# Patient Record
Sex: FEMALE | Race: ASIAN | NOT HISPANIC OR LATINO | Employment: FULL TIME | URBAN - METROPOLITAN AREA
[De-identification: names, ages, dates, MRNs, and addresses within clinical notes are randomized per-mention and may not be internally consistent; named-entity substitution may affect disease eponyms.]

---

## 2017-01-30 ENCOUNTER — PRENATAL OFFICE VISIT - HEALTHEAST (OUTPATIENT)
Dept: FAMILY MEDICINE | Facility: CLINIC | Age: 32
End: 2017-01-30

## 2017-01-30 DIAGNOSIS — M54.50 LOW BACK PAIN DURING PREGNANCY IN THIRD TRIMESTER: ICD-10-CM

## 2017-01-30 DIAGNOSIS — O26.893 LOW BACK PAIN DURING PREGNANCY IN THIRD TRIMESTER: ICD-10-CM

## 2017-01-30 DIAGNOSIS — E05.90 HYPERTHYROIDISM: ICD-10-CM

## 2017-01-30 DIAGNOSIS — Z33.1 PREGNANT STATE, INCIDENTAL: ICD-10-CM

## 2017-02-01 ENCOUNTER — COMMUNICATION - HEALTHEAST (OUTPATIENT)
Dept: FAMILY MEDICINE | Facility: CLINIC | Age: 32
End: 2017-02-01

## 2017-02-02 ENCOUNTER — AMBULATORY - HEALTHEAST (OUTPATIENT)
Dept: EDUCATION SERVICES | Facility: CLINIC | Age: 32
End: 2017-02-02

## 2017-02-02 ENCOUNTER — COMMUNICATION - HEALTHEAST (OUTPATIENT)
Dept: EDUCATION SERVICES | Facility: CLINIC | Age: 32
End: 2017-02-02

## 2017-02-02 DIAGNOSIS — O24.414 INSULIN CONTROLLED GESTATIONAL DIABETES MELLITUS (GDM) DURING PREGNANCY, ANTEPARTUM: ICD-10-CM

## 2017-02-07 ENCOUNTER — AMBULATORY - HEALTHEAST (OUTPATIENT)
Dept: EDUCATION SERVICES | Facility: CLINIC | Age: 32
End: 2017-02-07

## 2017-02-07 DIAGNOSIS — O24.419 GESTATIONAL DIABETES: ICD-10-CM

## 2017-02-08 ENCOUNTER — COMMUNICATION - HEALTHEAST (OUTPATIENT)
Dept: ENDOCRINOLOGY | Facility: CLINIC | Age: 32
End: 2017-02-08

## 2017-02-09 ENCOUNTER — OFFICE VISIT - HEALTHEAST (OUTPATIENT)
Dept: PHYSICAL THERAPY | Facility: REHABILITATION | Age: 32
End: 2017-02-09

## 2017-02-09 ENCOUNTER — COMMUNICATION - HEALTHEAST (OUTPATIENT)
Dept: ENDOCRINOLOGY | Facility: CLINIC | Age: 32
End: 2017-02-09

## 2017-02-09 DIAGNOSIS — M62.81 MUSCLE WEAKNESS (GENERALIZED): ICD-10-CM

## 2017-02-09 DIAGNOSIS — O26.893 PREGNANCY WITH LOW BACK PAIN IN THIRD TRIMESTER, ANTEPARTUM: ICD-10-CM

## 2017-02-09 DIAGNOSIS — M54.50 PREGNANCY WITH LOW BACK PAIN IN THIRD TRIMESTER, ANTEPARTUM: ICD-10-CM

## 2017-02-09 DIAGNOSIS — O24.414 INSULIN CONTROLLED GESTATIONAL DIABETES MELLITUS (GDM) DURING PREGNANCY, ANTEPARTUM: ICD-10-CM

## 2017-02-12 ENCOUNTER — COMMUNICATION - HEALTHEAST (OUTPATIENT)
Dept: FAMILY MEDICINE | Facility: CLINIC | Age: 32
End: 2017-02-12

## 2017-02-12 DIAGNOSIS — O24.419 GESTATIONAL DIABETES: ICD-10-CM

## 2017-02-12 DIAGNOSIS — O24.414 INSULIN CONTROLLED GESTATIONAL DIABETES MELLITUS (GDM) DURING PREGNANCY, ANTEPARTUM: ICD-10-CM

## 2017-02-14 ENCOUNTER — COMMUNICATION - HEALTHEAST (OUTPATIENT)
Dept: PHYSICAL THERAPY | Facility: REHABILITATION | Age: 32
End: 2017-02-14

## 2017-02-14 ENCOUNTER — PRENATAL OFFICE VISIT - HEALTHEAST (OUTPATIENT)
Dept: FAMILY MEDICINE | Facility: CLINIC | Age: 32
End: 2017-02-14

## 2017-02-14 DIAGNOSIS — Z33.1 PREGNANT STATE, INCIDENTAL: ICD-10-CM

## 2017-02-14 DIAGNOSIS — Z3A.32 32 WEEKS GESTATION OF PREGNANCY: ICD-10-CM

## 2017-02-16 ENCOUNTER — OFFICE VISIT - HEALTHEAST (OUTPATIENT)
Dept: EDUCATION SERVICES | Facility: CLINIC | Age: 32
End: 2017-02-16

## 2017-02-16 ENCOUNTER — OFFICE VISIT - HEALTHEAST (OUTPATIENT)
Dept: ENDOCRINOLOGY | Facility: CLINIC | Age: 32
End: 2017-02-16

## 2017-02-16 DIAGNOSIS — O24.414 INSULIN CONTROLLED GESTATIONAL DIABETES MELLITUS (GDM) IN THIRD TRIMESTER: ICD-10-CM

## 2017-02-16 ASSESSMENT — MIFFLIN-ST. JEOR: SCORE: 1395.09

## 2017-02-23 ENCOUNTER — COMMUNICATION - HEALTHEAST (OUTPATIENT)
Dept: FAMILY MEDICINE | Facility: CLINIC | Age: 32
End: 2017-02-23

## 2017-02-23 ENCOUNTER — OFFICE VISIT - HEALTHEAST (OUTPATIENT)
Dept: PHYSICAL THERAPY | Facility: REHABILITATION | Age: 32
End: 2017-02-23

## 2017-02-23 DIAGNOSIS — M62.81 MUSCLE WEAKNESS (GENERALIZED): ICD-10-CM

## 2017-02-23 DIAGNOSIS — O26.893 PREGNANCY WITH LOW BACK PAIN IN THIRD TRIMESTER, ANTEPARTUM: ICD-10-CM

## 2017-02-23 DIAGNOSIS — M54.50 PREGNANCY WITH LOW BACK PAIN IN THIRD TRIMESTER, ANTEPARTUM: ICD-10-CM

## 2017-02-23 DIAGNOSIS — O24.414 INSULIN CONTROLLED GESTATIONAL DIABETES MELLITUS (GDM) DURING PREGNANCY, ANTEPARTUM: ICD-10-CM

## 2017-02-23 DIAGNOSIS — O24.419 GESTATIONAL DIABETES: ICD-10-CM

## 2017-02-28 ENCOUNTER — PRENATAL OFFICE VISIT - HEALTHEAST (OUTPATIENT)
Dept: FAMILY MEDICINE | Facility: CLINIC | Age: 32
End: 2017-02-28

## 2017-02-28 ENCOUNTER — OFFICE VISIT - HEALTHEAST (OUTPATIENT)
Dept: PHYSICAL THERAPY | Facility: REHABILITATION | Age: 32
End: 2017-02-28

## 2017-02-28 DIAGNOSIS — O26.893 PREGNANCY WITH LOW BACK PAIN IN THIRD TRIMESTER, ANTEPARTUM: ICD-10-CM

## 2017-02-28 DIAGNOSIS — M62.81 MUSCLE WEAKNESS (GENERALIZED): ICD-10-CM

## 2017-02-28 DIAGNOSIS — O24.414 INSULIN CONTROLLED GESTATIONAL DIABETES MELLITUS (GDM) IN THIRD TRIMESTER: ICD-10-CM

## 2017-02-28 DIAGNOSIS — M54.50 PREGNANCY WITH LOW BACK PAIN IN THIRD TRIMESTER, ANTEPARTUM: ICD-10-CM

## 2017-02-28 DIAGNOSIS — Z33.1 PREGNANT STATE, INCIDENTAL: ICD-10-CM

## 2017-03-02 ENCOUNTER — COMMUNICATION - HEALTHEAST (OUTPATIENT)
Dept: FAMILY MEDICINE | Facility: CLINIC | Age: 32
End: 2017-03-02

## 2017-03-03 ENCOUNTER — HOSPITAL ENCOUNTER (OUTPATIENT)
Dept: ULTRASOUND IMAGING | Facility: CLINIC | Age: 32
Discharge: HOME | End: 2017-03-03
Attending: FAMILY MEDICINE

## 2017-03-03 ENCOUNTER — HOSPITAL ENCOUNTER (OUTPATIENT)
Dept: MEDSURG UNIT | Facility: CLINIC | Age: 32
Discharge: HOME | End: 2017-03-03
Attending: FAMILY MEDICINE | Admitting: FAMILY MEDICINE

## 2017-03-03 ENCOUNTER — OFFICE VISIT - HEALTHEAST (OUTPATIENT)
Dept: ENDOCRINOLOGY | Facility: CLINIC | Age: 32
End: 2017-03-03

## 2017-03-03 DIAGNOSIS — O24.414 INSULIN CONTROLLED GESTATIONAL DIABETES MELLITUS (GDM) IN THIRD TRIMESTER: ICD-10-CM

## 2017-03-03 DIAGNOSIS — E05.90 HYPERTHYROIDISM: ICD-10-CM

## 2017-03-03 ASSESSMENT — MIFFLIN-ST. JEOR: SCORE: 1406.43

## 2017-03-07 ENCOUNTER — OFFICE VISIT - HEALTHEAST (OUTPATIENT)
Dept: PHYSICAL THERAPY | Facility: REHABILITATION | Age: 32
End: 2017-03-07

## 2017-03-07 DIAGNOSIS — M54.50 PREGNANCY WITH LOW BACK PAIN IN THIRD TRIMESTER, ANTEPARTUM: ICD-10-CM

## 2017-03-07 DIAGNOSIS — O26.893 PREGNANCY WITH LOW BACK PAIN IN THIRD TRIMESTER, ANTEPARTUM: ICD-10-CM

## 2017-03-07 DIAGNOSIS — M62.81 MUSCLE WEAKNESS (GENERALIZED): ICD-10-CM

## 2017-03-09 ENCOUNTER — HOSPITAL ENCOUNTER (OUTPATIENT)
Dept: MEDSURG UNIT | Facility: CLINIC | Age: 32
Discharge: HOME | End: 2017-03-09
Attending: FAMILY MEDICINE | Admitting: FAMILY MEDICINE

## 2017-03-09 ENCOUNTER — HOSPITAL ENCOUNTER (OUTPATIENT)
Dept: ULTRASOUND IMAGING | Facility: CLINIC | Age: 32
Discharge: HOME | End: 2017-03-09
Attending: FAMILY MEDICINE

## 2017-03-09 DIAGNOSIS — O24.414 INSULIN CONTROLLED GESTATIONAL DIABETES MELLITUS (GDM) IN THIRD TRIMESTER: ICD-10-CM

## 2017-03-13 ENCOUNTER — COMMUNICATION - HEALTHEAST (OUTPATIENT)
Dept: FAMILY MEDICINE | Facility: CLINIC | Age: 32
End: 2017-03-13

## 2017-03-13 DIAGNOSIS — O24.414 INSULIN CONTROLLED GESTATIONAL DIABETES MELLITUS (GDM) DURING PREGNANCY, ANTEPARTUM: ICD-10-CM

## 2017-03-14 ENCOUNTER — PRENATAL OFFICE VISIT - HEALTHEAST (OUTPATIENT)
Dept: FAMILY MEDICINE | Facility: CLINIC | Age: 32
End: 2017-03-14

## 2017-03-14 DIAGNOSIS — Z33.1 PREGNANT STATE, INCIDENTAL: ICD-10-CM

## 2017-03-14 DIAGNOSIS — Z3A.36 36 WEEKS GESTATION OF PREGNANCY: ICD-10-CM

## 2017-03-14 DIAGNOSIS — O24.414 INSULIN CONTROLLED GESTATIONAL DIABETES MELLITUS (GDM) DURING PREGNANCY, ANTEPARTUM: ICD-10-CM

## 2017-03-16 ENCOUNTER — HOSPITAL ENCOUNTER (OUTPATIENT)
Dept: MEDSURG UNIT | Facility: CLINIC | Age: 32
Discharge: HOME | End: 2017-03-16
Attending: FAMILY MEDICINE | Admitting: FAMILY MEDICINE

## 2017-03-16 ENCOUNTER — HOSPITAL ENCOUNTER (OUTPATIENT)
Dept: ULTRASOUND IMAGING | Facility: CLINIC | Age: 32
Discharge: HOME | End: 2017-03-16
Attending: FAMILY MEDICINE

## 2017-03-16 DIAGNOSIS — O24.414 INSULIN CONTROLLED GESTATIONAL DIABETES MELLITUS (GDM) IN THIRD TRIMESTER: ICD-10-CM

## 2017-03-17 ENCOUNTER — OFFICE VISIT - HEALTHEAST (OUTPATIENT)
Dept: ENDOCRINOLOGY | Facility: CLINIC | Age: 32
End: 2017-03-17

## 2017-03-17 DIAGNOSIS — E05.90 HYPERTHYROIDISM: ICD-10-CM

## 2017-03-17 DIAGNOSIS — O24.414 INSULIN CONTROLLED GESTATIONAL DIABETES MELLITUS (GDM) DURING PREGNANCY, ANTEPARTUM: ICD-10-CM

## 2017-03-17 DIAGNOSIS — E04.9 GOITER: ICD-10-CM

## 2017-03-21 ENCOUNTER — PRENATAL OFFICE VISIT - HEALTHEAST (OUTPATIENT)
Dept: FAMILY MEDICINE | Facility: CLINIC | Age: 32
End: 2017-03-21

## 2017-03-21 DIAGNOSIS — Z3A.37 37 WEEKS GESTATION OF PREGNANCY: ICD-10-CM

## 2017-03-23 ENCOUNTER — HOSPITAL ENCOUNTER (OUTPATIENT)
Dept: ULTRASOUND IMAGING | Facility: CLINIC | Age: 32
Discharge: HOME | End: 2017-03-23
Attending: FAMILY MEDICINE

## 2017-03-23 ENCOUNTER — HOSPITAL ENCOUNTER (OUTPATIENT)
Dept: MEDSURG UNIT | Facility: CLINIC | Age: 32
Discharge: HOME | End: 2017-03-23
Attending: FAMILY MEDICINE | Admitting: FAMILY MEDICINE

## 2017-03-23 DIAGNOSIS — O24.414 INSULIN CONTROLLED GESTATIONAL DIABETES MELLITUS (GDM) IN THIRD TRIMESTER: ICD-10-CM

## 2017-03-28 ENCOUNTER — PRENATAL OFFICE VISIT - HEALTHEAST (OUTPATIENT)
Dept: FAMILY MEDICINE | Facility: CLINIC | Age: 32
End: 2017-03-28

## 2017-03-28 DIAGNOSIS — Z33.1 PREGNANT STATE, INCIDENTAL: ICD-10-CM

## 2017-03-30 ENCOUNTER — ANESTHESIA - HEALTHEAST (OUTPATIENT)
Dept: OBGYN | Facility: CLINIC | Age: 32
End: 2017-03-30

## 2017-04-03 ENCOUNTER — COMMUNICATION - HEALTHEAST (OUTPATIENT)
Dept: OBGYN | Facility: CLINIC | Age: 32
End: 2017-04-03

## 2017-04-18 ENCOUNTER — COMMUNICATION - HEALTHEAST (OUTPATIENT)
Dept: FAMILY MEDICINE | Facility: CLINIC | Age: 32
End: 2017-04-18

## 2017-05-09 ENCOUNTER — OFFICE VISIT - HEALTHEAST (OUTPATIENT)
Dept: FAMILY MEDICINE | Facility: CLINIC | Age: 32
End: 2017-05-09

## 2017-05-09 DIAGNOSIS — E05.90 HYPERTHYROIDISM: ICD-10-CM

## 2017-05-09 DIAGNOSIS — R21 RASH: ICD-10-CM

## 2017-05-09 LAB — HBA1C MFR BLD: 5.3 % (ref 3.5–6)

## 2017-06-01 ENCOUNTER — COMMUNICATION - HEALTHEAST (OUTPATIENT)
Dept: TELEHEALTH | Facility: CLINIC | Age: 32
End: 2017-06-01

## 2017-06-01 ENCOUNTER — COMMUNICATION - HEALTHEAST (OUTPATIENT)
Dept: HEALTH INFORMATION MANAGEMENT | Facility: CLINIC | Age: 32
End: 2017-06-01

## 2017-07-07 ENCOUNTER — COMMUNICATION - HEALTHEAST (OUTPATIENT)
Dept: FAMILY MEDICINE | Facility: CLINIC | Age: 32
End: 2017-07-07

## 2017-07-07 ENCOUNTER — OFFICE VISIT - HEALTHEAST (OUTPATIENT)
Dept: FAMILY MEDICINE | Facility: CLINIC | Age: 32
End: 2017-07-07

## 2017-07-07 DIAGNOSIS — R42 EPISODIC LIGHTHEADEDNESS: ICD-10-CM

## 2017-07-07 DIAGNOSIS — N91.2 AMENORRHEA: ICD-10-CM

## 2017-09-28 ENCOUNTER — OFFICE VISIT - HEALTHEAST (OUTPATIENT)
Dept: FAMILY MEDICINE | Facility: CLINIC | Age: 32
End: 2017-09-28

## 2017-09-28 DIAGNOSIS — E05.90 HYPERTHYROIDISM: ICD-10-CM

## 2017-09-28 DIAGNOSIS — E04.9 GOITER: ICD-10-CM

## 2017-09-28 DIAGNOSIS — R42 DIZZY: ICD-10-CM

## 2017-11-29 ENCOUNTER — OFFICE VISIT - HEALTHEAST (OUTPATIENT)
Dept: FAMILY MEDICINE | Facility: CLINIC | Age: 32
End: 2017-11-29

## 2017-11-29 DIAGNOSIS — J01.90 SINUSITIS, ACUTE: ICD-10-CM

## 2018-01-04 ENCOUNTER — OFFICE VISIT - HEALTHEAST (OUTPATIENT)
Dept: FAMILY MEDICINE | Facility: CLINIC | Age: 33
End: 2018-01-04

## 2018-01-04 DIAGNOSIS — N89.8 VAGINAL DISCHARGE: ICD-10-CM

## 2018-01-04 DIAGNOSIS — R21 RASH AND NONSPECIFIC SKIN ERUPTION: ICD-10-CM

## 2018-01-04 DIAGNOSIS — N92.6 IRREGULAR MENSTRUAL BLEEDING: ICD-10-CM

## 2018-01-04 LAB
CLUE CELLS: NORMAL
HCG UR QL: NEGATIVE
SP GR UR STRIP: 1.02 (ref 1–1.03)
TRICHOMONAS, WET PREP: NORMAL
YEAST, WET PREP: NORMAL

## 2018-01-17 ENCOUNTER — OFFICE VISIT - HEALTHEAST (OUTPATIENT)
Dept: FAMILY MEDICINE | Facility: CLINIC | Age: 33
End: 2018-01-17

## 2018-01-17 DIAGNOSIS — N39.46 MIXED STRESS AND URGE URINARY INCONTINENCE: ICD-10-CM

## 2018-01-17 DIAGNOSIS — L50.9 URTICARIA: ICD-10-CM

## 2018-01-17 DIAGNOSIS — N89.8 VAGINAL DISCHARGE: ICD-10-CM

## 2018-01-17 LAB
CLUE CELLS: NORMAL
TRICHOMONAS, WET PREP: NORMAL
YEAST, WET PREP: NORMAL

## 2018-01-17 ASSESSMENT — MIFFLIN-ST. JEOR: SCORE: 1334.99

## 2018-01-24 ENCOUNTER — OFFICE VISIT - HEALTHEAST (OUTPATIENT)
Dept: ALLERGY | Facility: CLINIC | Age: 33
End: 2018-01-24

## 2018-01-24 DIAGNOSIS — L50.9 URTICARIA: ICD-10-CM

## 2018-01-24 DIAGNOSIS — J30.81 CHRONIC ALLERGIC RHINITIS DUE TO ANIMAL HAIR AND DANDER: ICD-10-CM

## 2018-01-24 ASSESSMENT — MIFFLIN-ST. JEOR: SCORE: 1334.99

## 2018-02-15 ENCOUNTER — COMMUNICATION - HEALTHEAST (OUTPATIENT)
Dept: FAMILY MEDICINE | Facility: CLINIC | Age: 33
End: 2018-02-15

## 2018-02-15 DIAGNOSIS — R21 RASH AND NONSPECIFIC SKIN ERUPTION: ICD-10-CM

## 2018-02-27 ENCOUNTER — RECORDS - HEALTHEAST (OUTPATIENT)
Dept: ADMINISTRATIVE | Facility: OTHER | Age: 33
End: 2018-02-27

## 2018-06-12 ENCOUNTER — COMMUNICATION - HEALTHEAST (OUTPATIENT)
Dept: FAMILY MEDICINE | Facility: CLINIC | Age: 33
End: 2018-06-12

## 2018-06-12 DIAGNOSIS — R21 RASH AND NONSPECIFIC SKIN ERUPTION: ICD-10-CM

## 2018-07-05 ENCOUNTER — COMMUNICATION - HEALTHEAST (OUTPATIENT)
Dept: FAMILY MEDICINE | Facility: CLINIC | Age: 33
End: 2018-07-05

## 2018-07-05 DIAGNOSIS — Z11.1 SCREENING-PULMONARY TB: ICD-10-CM

## 2018-07-10 ENCOUNTER — AMBULATORY - HEALTHEAST (OUTPATIENT)
Dept: LAB | Facility: CLINIC | Age: 33
End: 2018-07-10

## 2018-07-10 DIAGNOSIS — Z11.1 SCREENING-PULMONARY TB: ICD-10-CM

## 2018-07-13 LAB
QTF INTERPRETATION: NORMAL
QTF MITOGEN - NIL: >10 IU/ML
QTF NIL: 0.15 IU/ML
QTF RESULT: NEGATIVE
QTF TB ANTIGEN - NIL: 0.08 IU/ML

## 2018-08-07 ENCOUNTER — RECORDS - HEALTHEAST (OUTPATIENT)
Dept: ADMINISTRATIVE | Facility: OTHER | Age: 33
End: 2018-08-07

## 2018-08-14 ENCOUNTER — COMMUNICATION - HEALTHEAST (OUTPATIENT)
Dept: FAMILY MEDICINE | Facility: CLINIC | Age: 33
End: 2018-08-14

## 2018-08-20 ENCOUNTER — OFFICE VISIT - HEALTHEAST (OUTPATIENT)
Dept: FAMILY MEDICINE | Facility: CLINIC | Age: 33
End: 2018-08-20

## 2018-08-20 DIAGNOSIS — R63.4 LOSS OF WEIGHT: ICD-10-CM

## 2018-08-20 DIAGNOSIS — D50.9 ANEMIA, IRON DEFICIENCY: ICD-10-CM

## 2018-08-20 DIAGNOSIS — E04.9 GOITER: ICD-10-CM

## 2018-08-20 LAB
ALBUMIN SERPL-MCNC: 4.3 G/DL (ref 3.5–5)
ALP SERPL-CCNC: 68 U/L (ref 45–120)
ALT SERPL W P-5'-P-CCNC: 12 U/L (ref 0–45)
ANION GAP SERPL CALCULATED.3IONS-SCNC: 11 MMOL/L (ref 5–18)
AST SERPL W P-5'-P-CCNC: 16 U/L (ref 0–40)
BILIRUB SERPL-MCNC: 1.3 MG/DL (ref 0–1)
BUN SERPL-MCNC: 7 MG/DL (ref 8–22)
CALCIUM SERPL-MCNC: 9.5 MG/DL (ref 8.5–10.5)
CHLORIDE BLD-SCNC: 105 MMOL/L (ref 98–107)
CO2 SERPL-SCNC: 25 MMOL/L (ref 22–31)
CREAT SERPL-MCNC: 0.67 MG/DL (ref 0.6–1.1)
ERYTHROCYTE [DISTWIDTH] IN BLOOD BY AUTOMATED COUNT: 13.5 % (ref 11–14.5)
FERRITIN SERPL-MCNC: 37 NG/ML (ref 10–130)
GFR SERPL CREATININE-BSD FRML MDRD: >60 ML/MIN/1.73M2
GLUCOSE BLD-MCNC: 72 MG/DL (ref 70–125)
HBA1C MFR BLD: 5.5 % (ref 3.5–6)
HCT VFR BLD AUTO: 40.1 % (ref 35–47)
HGB BLD-MCNC: 13.5 G/DL (ref 12–16)
MCH RBC QN AUTO: 28.8 PG (ref 27–34)
MCHC RBC AUTO-ENTMCNC: 33.7 G/DL (ref 32–36)
MCV RBC AUTO: 85 FL (ref 80–100)
PLATELET # BLD AUTO: 290 THOU/UL (ref 140–440)
PMV BLD AUTO: 8.2 FL (ref 7–10)
POTASSIUM BLD-SCNC: 4.1 MMOL/L (ref 3.5–5)
PROT SERPL-MCNC: 7.2 G/DL (ref 6–8)
RBC # BLD AUTO: 4.69 MILL/UL (ref 3.8–5.4)
SODIUM SERPL-SCNC: 141 MMOL/L (ref 136–145)
T3 SERPL-MCNC: 44 NG/DL (ref 45–175)
T4 FREE SERPL-MCNC: 0.9 NG/DL (ref 0.7–1.8)
T4 TOTAL - HISTORICAL: 6 UG/DL (ref 4.5–13)
TSH SERPL DL<=0.005 MIU/L-ACNC: 0.62 UIU/ML (ref 0.3–5)
WBC: 7.8 THOU/UL (ref 4–11)

## 2018-09-05 ENCOUNTER — COMMUNICATION - HEALTHEAST (OUTPATIENT)
Dept: FAMILY MEDICINE | Facility: CLINIC | Age: 33
End: 2018-09-05

## 2018-09-05 DIAGNOSIS — R21 RASH AND NONSPECIFIC SKIN ERUPTION: ICD-10-CM

## 2018-12-28 ENCOUNTER — OFFICE VISIT - HEALTHEAST (OUTPATIENT)
Dept: FAMILY MEDICINE | Facility: CLINIC | Age: 33
End: 2018-12-28

## 2018-12-28 DIAGNOSIS — N92.6 MISSED MENSES: ICD-10-CM

## 2018-12-28 DIAGNOSIS — R19.5 CHANGE IN CONSISTENCY OF STOOL: ICD-10-CM

## 2018-12-28 LAB
HCG UR QL: NEGATIVE
SP GR UR STRIP: 1.02 (ref 1–1.03)

## 2019-01-04 ENCOUNTER — AMBULATORY - HEALTHEAST (OUTPATIENT)
Dept: LAB | Facility: CLINIC | Age: 34
End: 2019-01-04

## 2019-01-04 DIAGNOSIS — R19.5 CHANGE IN CONSISTENCY OF STOOL: ICD-10-CM

## 2019-01-05 LAB
SHIGA TOXIN 1: NEGATIVE
SHIGA TOXIN 2: NEGATIVE

## 2019-01-07 LAB
BACTERIA SPEC CULT: NORMAL
O+P STL MICRO: NORMAL

## 2019-01-21 ENCOUNTER — RECORDS - HEALTHEAST (OUTPATIENT)
Dept: ADMINISTRATIVE | Facility: OTHER | Age: 34
End: 2019-01-21

## 2019-03-18 ENCOUNTER — OFFICE VISIT - HEALTHEAST (OUTPATIENT)
Dept: FAMILY MEDICINE | Facility: CLINIC | Age: 34
End: 2019-03-18

## 2019-03-18 ENCOUNTER — RECORDS - HEALTHEAST (OUTPATIENT)
Dept: GENERAL RADIOLOGY | Facility: CLINIC | Age: 34
End: 2019-03-18
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
## 2019-03-18 ASSESSMENT — MIFFLIN-ST. JEOR: SCORE: 1271.49

## 2019-03-22 ENCOUNTER — RECORDS - HEALTHEAST (OUTPATIENT)
Dept: ADMINISTRATIVE | Facility: OTHER | Age: 34
End: 2019-03-22

## 2019-06-24 ENCOUNTER — RECORDS - HEALTHEAST (OUTPATIENT)
Dept: ADMINISTRATIVE | Facility: OTHER | Age: 34
End: 2019-06-24

## 2019-08-05 ENCOUNTER — OFFICE VISIT - HEALTHEAST (OUTPATIENT)
Dept: FAMILY MEDICINE | Facility: CLINIC | Age: 34
End: 2019-08-05

## 2019-08-05 DIAGNOSIS — E05.90 HYPERTHYROIDISM: ICD-10-CM

## 2019-08-05 DIAGNOSIS — R35.0 URINARY FREQUENCY: ICD-10-CM

## 2019-08-05 DIAGNOSIS — R21 RASH AND NONSPECIFIC SKIN ERUPTION: ICD-10-CM

## 2019-08-05 DIAGNOSIS — G47.00 INSOMNIA, UNSPECIFIED TYPE: ICD-10-CM

## 2019-08-05 DIAGNOSIS — N39.3 FEMALE STRESS INCONTINENCE: ICD-10-CM

## 2019-08-05 LAB
ANION GAP SERPL CALCULATED.3IONS-SCNC: 6 MMOL/L (ref 5–18)
BUN SERPL-MCNC: 9 MG/DL (ref 8–22)
CALCIUM SERPL-MCNC: 9.5 MG/DL (ref 8.5–10.5)
CHLORIDE BLD-SCNC: 108 MMOL/L (ref 98–107)
CO2 SERPL-SCNC: 28 MMOL/L (ref 22–31)
CREAT SERPL-MCNC: 0.65 MG/DL (ref 0.6–1.1)
GFR SERPL CREATININE-BSD FRML MDRD: >60 ML/MIN/1.73M2
GLUCOSE BLD-MCNC: 78 MG/DL (ref 70–125)
HBA1C MFR BLD: 5.5 % (ref 3.5–6)
POTASSIUM BLD-SCNC: 3.8 MMOL/L (ref 3.5–5)
SODIUM SERPL-SCNC: 142 MMOL/L (ref 136–145)
T4 FREE SERPL-MCNC: 0.9 NG/DL (ref 0.7–1.8)
TSH SERPL DL<=0.005 MIU/L-ACNC: 0.6 UIU/ML (ref 0.3–5)

## 2019-08-05 ASSESSMENT — MIFFLIN-ST. JEOR: SCORE: 1271.03

## 2019-08-07 ENCOUNTER — COMMUNICATION - HEALTHEAST (OUTPATIENT)
Dept: FAMILY MEDICINE | Facility: CLINIC | Age: 34
End: 2019-08-07

## 2019-08-30 ENCOUNTER — RECORDS - HEALTHEAST (OUTPATIENT)
Dept: ADMINISTRATIVE | Facility: OTHER | Age: 34
End: 2019-08-30

## 2019-09-20 ENCOUNTER — COMMUNICATION - HEALTHEAST (OUTPATIENT)
Dept: FAMILY MEDICINE | Facility: CLINIC | Age: 34
End: 2019-09-20

## 2019-09-25 ENCOUNTER — OFFICE VISIT - HEALTHEAST (OUTPATIENT)
Dept: FAMILY MEDICINE | Facility: CLINIC | Age: 34
End: 2019-09-25

## 2019-09-25 DIAGNOSIS — N91.2 AMENORRHEA: ICD-10-CM

## 2019-09-25 DIAGNOSIS — J06.9 UPPER RESPIRATORY TRACT INFECTION, UNSPECIFIED TYPE: ICD-10-CM

## 2019-09-25 DIAGNOSIS — Z91.09 ENVIRONMENTAL ALLERGIES: ICD-10-CM

## 2019-09-25 LAB — HCG SERPL QL: POSITIVE

## 2019-09-25 ASSESSMENT — MIFFLIN-ST. JEOR: SCORE: 1241.72

## 2019-09-27 ENCOUNTER — COMMUNICATION - HEALTHEAST (OUTPATIENT)
Dept: FAMILY MEDICINE | Facility: CLINIC | Age: 34
End: 2019-09-27

## 2019-09-30 ENCOUNTER — PRENATAL OFFICE VISIT - HEALTHEAST (OUTPATIENT)
Dept: FAMILY MEDICINE | Facility: CLINIC | Age: 34
End: 2019-09-30

## 2019-09-30 DIAGNOSIS — E05.90 HYPERTHYROIDISM: ICD-10-CM

## 2019-09-30 DIAGNOSIS — Z34.90 PREGNANCY, UNSPECIFIED GESTATIONAL AGE: ICD-10-CM

## 2019-09-30 DIAGNOSIS — J01.00 ACUTE NON-RECURRENT MAXILLARY SINUSITIS: ICD-10-CM

## 2019-09-30 LAB
ABORH_EXT (HISTORICAL CONVERSION): NORMAL
ALBUMIN UR-MCNC: NEGATIVE MG/DL
ANTIBODY_EXT (HISTORICAL CONVERSION): NEGATIVE
APPEARANCE UR: ABNORMAL
BASOPHILS # BLD AUTO: 0.1 THOU/UL (ref 0–0.2)
BASOPHILS NFR BLD AUTO: 1 % (ref 0–2)
BILIRUB UR QL STRIP: NEGATIVE
COLOR UR AUTO: YELLOW
EOSINOPHIL # BLD AUTO: 0.2 THOU/UL (ref 0–0.4)
EOSINOPHIL NFR BLD AUTO: 3 % (ref 0–6)
ERYTHROCYTE [DISTWIDTH] IN BLOOD BY AUTOMATED COUNT: 14.1 % (ref 11–14.5)
GLUCOSE UR STRIP-MCNC: NEGATIVE MG/DL
HBSAG_EXT (HISTORICAL CONVERSION): NEGATIVE
HCT VFR BLD AUTO: 37.7 % (ref 35–47)
HGB BLD-MCNC: 12.1 G/DL (ref 12–16)
HGB UR QL STRIP: NEGATIVE
HIV 1+2 AB+HIV1 P24 AG SERPL QL IA: NEGATIVE
HIV_EXT: NEGATIVE
KETONES UR STRIP-MCNC: NEGATIVE MG/DL
LEUKOCYTE ESTERASE UR QL STRIP: ABNORMAL
LYMPHOCYTES # BLD AUTO: 1.9 THOU/UL (ref 0.8–4.4)
LYMPHOCYTES NFR BLD AUTO: 28 % (ref 20–40)
MCH RBC QN AUTO: 28 PG (ref 27–34)
MCHC RBC AUTO-ENTMCNC: 32.1 G/DL (ref 32–36)
MCV RBC AUTO: 87 FL (ref 80–100)
MONOCYTES # BLD AUTO: 0.5 THOU/UL (ref 0–0.9)
MONOCYTES NFR BLD AUTO: 7 % (ref 2–10)
NEUTROPHILS # BLD AUTO: 4.3 THOU/UL (ref 2–7.7)
NEUTROPHILS NFR BLD AUTO: 62 % (ref 50–70)
NITRATE UR QL: NEGATIVE
PH UR STRIP: 6 [PH] (ref 5–8)
PLATELET # BLD AUTO: 278 THOU/UL (ref 140–440)
PMV BLD AUTO: 11.3 FL (ref 8.5–12.5)
RBC # BLD AUTO: 4.32 MILL/UL (ref 3.8–5.4)
RPR - HISTORICAL: NORMAL
RUBELLA_EXT (HISTORICAL CONVERSION): NORMAL
SP GR UR STRIP: 1.02 (ref 1–1.03)
UROBILINOGEN UR STRIP-ACNC: ABNORMAL
WBC: 6.9 THOU/UL (ref 4–11)

## 2019-09-30 ASSESSMENT — MIFFLIN-ST. JEOR: SCORE: 1231.09

## 2019-10-01 ENCOUNTER — COMMUNICATION - HEALTHEAST (OUTPATIENT)
Dept: FAMILY MEDICINE | Facility: CLINIC | Age: 34
End: 2019-10-01

## 2019-10-01 DIAGNOSIS — Z34.90 PREGNANCY, UNSPECIFIED GESTATIONAL AGE: ICD-10-CM

## 2019-10-01 LAB
ABO/RH(D): NORMAL
ABORH REPEAT: NORMAL
ANTIBODY SCREEN: NEGATIVE
BACTERIA SPEC CULT: NORMAL
HBV SURFACE AG SERPL QL IA: NEGATIVE
RUBV IGG SERPL QL IA: POSITIVE
T PALLIDUM AB SER QL: NEGATIVE
TSH SERPL DL<=0.005 MIU/L-ACNC: 0.62 UIU/ML (ref 0.3–5)

## 2019-10-04 ENCOUNTER — COMMUNICATION - HEALTHEAST (OUTPATIENT)
Dept: FAMILY MEDICINE | Facility: CLINIC | Age: 34
End: 2019-10-04

## 2019-10-04 DIAGNOSIS — Z33.1 PREGNANCY, INCIDENTAL: ICD-10-CM

## 2019-10-09 ENCOUNTER — COMMUNICATION - HEALTHEAST (OUTPATIENT)
Dept: FAMILY MEDICINE | Facility: CLINIC | Age: 34
End: 2019-10-09

## 2019-10-09 DIAGNOSIS — Z91.09 ENVIRONMENTAL ALLERGIES: ICD-10-CM

## 2019-10-22 ENCOUNTER — OFFICE VISIT - HEALTHEAST (OUTPATIENT)
Dept: FAMILY MEDICINE | Facility: CLINIC | Age: 34
End: 2019-10-22

## 2019-10-22 DIAGNOSIS — O30.001 TWIN GESTATION IN FIRST TRIMESTER, UNSPECIFIED MULTIPLE GESTATION TYPE: ICD-10-CM

## 2019-10-22 LAB
ALBUMIN UR-MCNC: NEGATIVE MG/DL
APPEARANCE UR: CLEAR
BACTERIA #/AREA URNS HPF: ABNORMAL HPF
BILIRUB UR QL STRIP: NEGATIVE
COLOR UR AUTO: YELLOW
FASTING STATUS PATIENT QL REPORTED: NO
GLUCOSE 1H P 50 G GLC PO SERPL-MCNC: 98 MG/DL (ref 70–139)
GLUCOSE UR STRIP-MCNC: NEGATIVE MG/DL
HCG SERPL-ACNC: ABNORMAL MLU/ML (ref 0–4)
HGB UR QL STRIP: ABNORMAL
KETONES UR STRIP-MCNC: NEGATIVE MG/DL
LEUKOCYTE ESTERASE UR QL STRIP: NEGATIVE
MUCOUS THREADS #/AREA URNS LPF: ABNORMAL LPF
NITRATE UR QL: NEGATIVE
PH UR STRIP: 6 [PH] (ref 5–8)
RBC #/AREA URNS AUTO: ABNORMAL HPF
SP GR UR STRIP: 1.02 (ref 1–1.03)
SQUAMOUS #/AREA URNS AUTO: ABNORMAL LPF
UROBILINOGEN UR STRIP-ACNC: ABNORMAL
WBC #/AREA URNS AUTO: ABNORMAL HPF

## 2019-11-05 ENCOUNTER — RECORDS - HEALTHEAST (OUTPATIENT)
Dept: ADMINISTRATIVE | Facility: OTHER | Age: 34
End: 2019-11-05

## 2019-11-08 ENCOUNTER — RECORDS - HEALTHEAST (OUTPATIENT)
Dept: ADMINISTRATIVE | Facility: OTHER | Age: 34
End: 2019-11-08

## 2019-11-26 ENCOUNTER — RECORDS - HEALTHEAST (OUTPATIENT)
Dept: ADMINISTRATIVE | Facility: OTHER | Age: 34
End: 2019-11-26

## 2019-12-19 ENCOUNTER — OFFICE VISIT - HEALTHEAST (OUTPATIENT)
Dept: FAMILY MEDICINE | Facility: CLINIC | Age: 34
End: 2019-12-19

## 2019-12-19 DIAGNOSIS — R09.81 NASAL CONGESTION: ICD-10-CM

## 2019-12-19 DIAGNOSIS — H65.92 OME (OTITIS MEDIA WITH EFFUSION), LEFT: ICD-10-CM

## 2019-12-19 ASSESSMENT — MIFFLIN-ST. JEOR: SCORE: 1284.1

## 2019-12-26 ENCOUNTER — RECORDS - HEALTHEAST (OUTPATIENT)
Dept: ADMINISTRATIVE | Facility: OTHER | Age: 34
End: 2019-12-26

## 2020-01-16 ENCOUNTER — OFFICE VISIT - HEALTHEAST (OUTPATIENT)
Dept: ALLERGY | Facility: CLINIC | Age: 35
End: 2020-01-16

## 2020-01-16 DIAGNOSIS — Z91.09 ENVIRONMENTAL ALLERGIES: ICD-10-CM

## 2020-02-04 ENCOUNTER — COMMUNICATION - HEALTHEAST (OUTPATIENT)
Dept: FAMILY MEDICINE | Facility: CLINIC | Age: 35
End: 2020-02-04

## 2020-02-06 ENCOUNTER — COMMUNICATION - HEALTHEAST (OUTPATIENT)
Dept: FAMILY MEDICINE | Facility: CLINIC | Age: 35
End: 2020-02-06

## 2020-02-11 ENCOUNTER — PRENATAL OFFICE VISIT - HEALTHEAST (OUTPATIENT)
Dept: FAMILY MEDICINE | Facility: CLINIC | Age: 35
End: 2020-02-11

## 2020-02-11 DIAGNOSIS — Z34.90 PREGNANCY, UNSPECIFIED GESTATIONAL AGE: ICD-10-CM

## 2020-02-11 DIAGNOSIS — J01.00 ACUTE NON-RECURRENT MAXILLARY SINUSITIS: ICD-10-CM

## 2020-02-11 DIAGNOSIS — E05.90 HYPERTHYROIDISM: ICD-10-CM

## 2020-02-19 ENCOUNTER — COMMUNICATION - HEALTHEAST (OUTPATIENT)
Dept: FAMILY MEDICINE | Facility: CLINIC | Age: 35
End: 2020-02-19

## 2020-02-21 ENCOUNTER — COMMUNICATION - HEALTHEAST (OUTPATIENT)
Dept: FAMILY MEDICINE | Facility: CLINIC | Age: 35
End: 2020-02-21

## 2020-02-21 ENCOUNTER — AMBULATORY - HEALTHEAST (OUTPATIENT)
Dept: LAB | Facility: CLINIC | Age: 35
End: 2020-02-21

## 2020-02-21 DIAGNOSIS — Z34.90 PREGNANCY, UNSPECIFIED GESTATIONAL AGE: ICD-10-CM

## 2020-02-21 DIAGNOSIS — E05.90 HYPERTHYROIDISM: ICD-10-CM

## 2020-02-21 LAB
FASTING STATUS PATIENT QL REPORTED: NO
GLUCOSE 1H P 50 G GLC PO SERPL-MCNC: 132 MG/DL (ref 70–139)
HGB BLD-MCNC: 12.2 G/DL (ref 12–16)
T3 SERPL-MCNC: 101 NG/DL (ref 45–175)
T4 FREE SERPL-MCNC: 0.6 NG/DL (ref 0.7–1.8)
T4 TOTAL - HISTORICAL: 5.7 UG/DL (ref 4.5–13)
TSH SERPL DL<=0.005 MIU/L-ACNC: 0.17 UIU/ML (ref 0.3–5)

## 2020-02-22 LAB — T PALLIDUM AB SER QL: NEGATIVE

## 2020-03-02 ENCOUNTER — COMMUNICATION - HEALTHEAST (OUTPATIENT)
Dept: FAMILY MEDICINE | Facility: CLINIC | Age: 35
End: 2020-03-02

## 2020-03-10 ENCOUNTER — TELEPHONE (OUTPATIENT)
Dept: ENDOCRINOLOGY | Facility: CLINIC | Age: 35
End: 2020-03-10

## 2020-03-10 NOTE — TELEPHONE ENCOUNTER
To schedulers : please schedule with consult service (or open new/MAURILIO) within 1-2 weeks.  I don't see that she has been to DM education within the system.  This also needs to be scheduled if it isn't already scheduled.      Blaire Feng MD  Endocrine triage        [facility]    Phone Message    May a detailed message be left on voicemail: yes     Reason for Call: Other: Pt called to ask for a progress report.  She says she sent her records over from [facility] for gestastional diabetes and has been waiting for a call back after a review of those records.  Please call her to update.      I saw nothing in her record newer than 06/2013    Action Taken: Message routed to:  [facility] endocrine    Travel Screening: Not Applicable

## 2020-03-12 ENCOUNTER — TRANSCRIBE ORDERS (OUTPATIENT)
Dept: OTHER | Age: 35
End: 2020-03-12

## 2020-03-12 ENCOUNTER — PRENATAL OFFICE VISIT - HEALTHEAST (OUTPATIENT)
Dept: FAMILY MEDICINE | Facility: CLINIC | Age: 35
End: 2020-03-12

## 2020-03-12 ENCOUNTER — AMBULATORY - HEALTHEAST (OUTPATIENT)
Dept: FAMILY MEDICINE | Facility: CLINIC | Age: 35
End: 2020-03-12

## 2020-03-12 DIAGNOSIS — O24.419 GESTATIONAL DIABETES MELLITUS (GDM) IN SECOND TRIMESTER, GESTATIONAL DIABETES METHOD OF CONTROL UNSPECIFIED: ICD-10-CM

## 2020-03-12 DIAGNOSIS — Z34.90 PREGNANCY, UNSPECIFIED GESTATIONAL AGE: ICD-10-CM

## 2020-03-12 DIAGNOSIS — Z91.09 ENVIRONMENTAL ALLERGIES: ICD-10-CM

## 2020-03-12 DIAGNOSIS — E05.90 HYPERTHYROIDISM: Primary | ICD-10-CM

## 2020-03-12 LAB
FASTING STATUS PATIENT QL REPORTED: NO
GLUCOSE 1H P 50 G GLC PO SERPL-MCNC: 163 MG/DL (ref 70–139)

## 2020-03-13 ENCOUNTER — ALLIED HEALTH/NURSE VISIT (OUTPATIENT)
Dept: EDUCATION SERVICES | Facility: CLINIC | Age: 35
End: 2020-03-13
Payer: COMMERCIAL

## 2020-03-13 DIAGNOSIS — O24.419 GESTATIONAL DIABETES: Primary | ICD-10-CM

## 2020-03-13 RX ORDER — URINE GLUCOSE-ACET TEST STRIP
1 STRIP MISCELLANEOUS DAILY
Qty: 50 EACH | Refills: 0 | Status: SHIPPED | OUTPATIENT
Start: 2020-03-13 | End: 2021-06-30

## 2020-03-13 RX ORDER — LANCETS
EACH MISCELLANEOUS
Qty: 306 EACH | Refills: 3 | Status: SHIPPED | OUTPATIENT
Start: 2020-03-13 | End: 2020-10-23

## 2020-03-13 RX ORDER — BLOOD SUGAR DIAGNOSTIC
STRIP MISCELLANEOUS
Qty: 300 EACH | Refills: 3 | Status: SHIPPED | OUTPATIENT
Start: 2020-03-13 | End: 2020-10-23

## 2020-03-13 NOTE — PROGRESS NOTES
"Diabetes Self-Management Education & Support  Gestational Diabetes Self-Management Education & Support    SUBJECTIVE/OBJECTIVE:  Presents for education related to gestational diabetes.    Cultural Influences/Ethnic Background:  Mick  Weight today is 167 lbs  Weight prior to pregnancy was 145 lbs  Height 5'1\"  Had GDM in previous pregnancies: 2014, 2015 and 2016 required insulin in all of them  Did receive education in those pregnancies at [facility]   Referred by Dr Haily Shore at [facility] for Gestational Diabetes    Estimated Date of Delivery: Data Unavailable    1 hour OGTT  Lab Results   Component Value Date    GLU1 137 (H) 10/15/2013       3 hour OGTT    Fasting  Lab Results   Component Value Date    GLF 81 10/17/2013       1 hour  Lab Results   Component Value Date    GL1 227 (H) 10/17/2013       2 hour  Lab Results   Component Value Date    GL2 191 (H) 10/17/2013       3 hour  Lab Results   Component Value Date    GL3 105 10/17/2013       Lifestyle and Health Behaviors:  Activity limited due to back pain  Breakfast - 1 medium pancakes, 1/2 monk fruit and 1/2 regular syrup or 2 slices Upper sorbian toast with syrup as previously mentioned or plain bagel with cream cheese   Or occasionally at work will have 2 pieces toast white with butter and strawberry jam   Lunch - cafeteria at work Grilled chicken sandwich tomato rodriguez lettuce with vegetables, grapes  Lunch at home would be sticky rice with pork belly and spicy sauce  Dinner - sticky rice with pork belly or rice with scrambled egg and johnson or rice with stir andres is common   Dessert - traci coconut pancakes made with rice and tapioca flour or strawberry and nutela crepes   Recently got some stevia    Healthy Coping:  Has supportive fiance and three children whom she lives with     Current Management:  Diet    ASSESSMENT:  Patient with history of GDM, seen today for education     INTERVENTION:  Patient was instructed on Accu-Chek Guide Me " meter and was able to provide an accurate return demonstration. Patient's blood glucose reading today was 139 mg/dL.    Educational topics covered today:  GDM diagnosis, pathophysiology, Means of controlling GDM, Using a Blood Glucose Monitor, Blood Glucose Goals, Logging and Interpreting Glucose Results, Ketone Testing, When to Call a Diabetes Educator or OB Provider, Healthy Eating During Pregnancy, Counting Carbohydrates, Meal Planning for GDM, and Physical Activity    Educational materials provided today:   Cat Understanding Gestational Diabetes  Sample Meal Plan for Diabetes  Insulin Requirement During Pregnancy  Accu-Chek Guide Me meter kit    Pt verbalized understanding of concepts discussed and recommendations provided today.     PLAN:  Check glucose 4 times daily, before breakfast and 1 hour after each meal.     Check Ketones daily for one week, if negative, reduce testing to once a week.     Physical activity recommended: as able.    Meal plan: 30 to 45 g carbs at breakfast, 45 to 60 g carbs at lunch, 45 to 60 g carbs at supper, 15 to 30 g carbs at 3 snacks a day.  Follow consistent CHO meal plan, eat CHO and protein/fat at all meals/snacks.    We discussed specific ideas and ways to modify meals and snacks    Call/e-mail/Opptenhart message diabetes educator if 3 or more blood sugars are above the goal in 1 week, if ketones are positive, or with questions/concerns.    Time Spent: 90 minutes  Encounter Type: Individual    Any diabetes medication dose changes were made via the CDE Protocol and Collaborative Practice Agreement with the patient's referring provider. A copy of this encounter was shared with the provider.

## 2020-03-16 ENCOUNTER — COMMUNICATION - HEALTHEAST (OUTPATIENT)
Dept: ADMINISTRATIVE | Facility: CLINIC | Age: 35
End: 2020-03-16

## 2020-03-17 NOTE — TELEPHONE ENCOUNTER
Patient had recent referral for GDM. Has seen diabetic ed.     Patient now being referred for Hyperthyroid in pregnancy.     Please triage.

## 2020-03-19 ENCOUNTER — COMMUNICATION - HEALTHEAST (OUTPATIENT)
Dept: ADMINISTRATIVE | Facility: CLINIC | Age: 35
End: 2020-03-19

## 2020-03-19 ENCOUNTER — RECORDS - HEALTHEAST (OUTPATIENT)
Dept: FAMILY MEDICINE | Facility: CLINIC | Age: 35
End: 2020-03-19

## 2020-03-19 ENCOUNTER — PATIENT OUTREACH (OUTPATIENT)
Dept: EDUCATION SERVICES | Facility: CLINIC | Age: 35
End: 2020-03-19
Payer: COMMERCIAL

## 2020-03-19 ENCOUNTER — APPOINTMENT (OUTPATIENT)
Dept: INTERPRETER SERVICES | Facility: CLINIC | Age: 35
End: 2020-03-19
Payer: COMMERCIAL

## 2020-03-19 ENCOUNTER — RECORDS - HEALTHEAST (OUTPATIENT)
Dept: ADMINISTRATIVE | Facility: OTHER | Age: 35
End: 2020-03-19

## 2020-03-19 NOTE — PROGRESS NOTES
Diabetes Self-Management Education & Support  Follow-up Gestational Diabetes Self-Management Education & Support  Telephone Visit    SUBJECTIVE/OBJECTIVE:  Presents for education related to gestational diabetes.    Cultural Influences/Ethnic Background:  Hmong    There were no vitals taken for this visit.      Estimated Date of Delivery: Data Unavailable    Blood Glucose/Ketone Log: testing one hour after meals  Date Ketones Fasting Post Breakfast Post Lunch Post Supper   3/13   139 159 127   Battery  in meter, no tests        3/15 Negative n/a n/a 124 130   3/16 n/a n/a 120 after snack n/a 124   3/17  89 128 145 133   3/18  n/a 166 135 143   3/19  86        29 weeks gestation tomorrow    Lifestyle and Health Behaviors:  Yesterday breakfast pizza at [employer], so was more carb. On other days she's been watching her carb intake more - had on 3/17 one pancake with monk fruit syrup with 3 eggs (37 grams carb)  Snack was crepe with little Nutella and three strawberries  Replacing sugar with stevia  Is trying to watch rice and reduce portion. Yesterday for lunch had two beef taco with corn sour cream tomato and salsa on small flour tortilla. Two days ago had breakfast burrito eggs cheese salsa and 1/4 cup sausage potato hash. On 3/13 had akido buffet meat and rice   She is working on watching her carbs, but it has been challenging  More glucose testing is needed, discussed this with patient  Yesterday had fried fish sticks, 2 eggs, pepper sauce and 1/4 cup rice    Current Management:  Diet    ASSESSMENT:  Ketones: one negative reading.   Fasting blood glucoses: 100% in target.  After breakfast: 2/3 in target.  After lunch: 2/4 in target.  After dinner: 4/5 in target.    PLAN:  Check glucose four times daily. Fasting and one hour after each meal.  Check ketones once a week when readings are consistently negative. For now continue to test daily.  Continue with recommended physical activity.  Continue to follow  recommended meal plan: 30-45g carbs at breakfast, 45-60g carbs at lunch, 45-60g carbs at supper, 15-30g carbs at snacks.  Follow consistent CHO meal plan, eat CHO and protein/fat at all meals/snacks.    Call made to [facility] and message left for Dr. Haily Shore to call me back so we can discuss the plan of care for this patient. It appears patient is likely to need insulin in this pregnancy.    Time Spent: 30 minutes  Encounter Type: Individual    Any diabetes medication dose changes were made via the CDE Protocol and Collaborative Practice Agreement with the patient's referring provider. A copy of this encounter was shared with the provider.    Addendum: Spoke with Dr. Shore and agreed to give patient another 5 days to check glucose readings more regularly and then to transfer her GDM cares back to the [facility]. This information was communicated to the patient. Message was also sent to our clinic coordinators regarding expediting her referral to Endo.

## 2020-03-24 ENCOUNTER — PATIENT OUTREACH (OUTPATIENT)
Dept: EDUCATION SERVICES | Facility: CLINIC | Age: 35
End: 2020-03-24
Payer: COMMERCIAL

## 2020-03-24 NOTE — PROGRESS NOTES
Outgoing call made to nothing to review her blood glucose readings.    Date Fasting 1 hour after breakfast 1 hour after lunch 1 hour after dinner   3/24  94  184     3/23   198  152    3/22  93   119  166   3/21  90  127   184   3/20  98  137  189  96   3/19  86  129  135  255            Patient reports negative ketones.    Previous readings from telephone visit 3/19:    Date Ketones Fasting Post Breakfast Post Lunch Post Supper   3/13     139 159 127   Battery  in meter, no tests             3/15 Negative n/a n/a 124 130   3/16 n/a n/a 120 after snack n/a 124   3/17   89 128 145 133   3/18   n/a 166 135 143   3/19   86            6 out of 7 fasting blood glucose readings are under 95.  67% of blood glucose readings after breakfast are in target range of under 140  50% of blood glucose readings after lunch are in target range  55% of blood glucose readings after dinner are in target range, but have worsened over the past 5 days    Patient is watching her carbohydrate intake closely and has cut back on high carb foods such as rice.  Despite controlling her diet her blood glucoses remain elevated as noted above.  Patient has taken insulin with 2 pregnancies in the past and is skilled and comfortable with taking insulin.  Reviewed with patient today via phone: Insulin injection technique and storage of insulin.    This writer made a call to the [facility] and spoke with Dr. Wandy Nagy who is covering for Dr. Heather Shore.  Recommend to start 2 units mealtime insulin (Humalog/NovoLog) with lunch and dinner.  Blood glucose readings and recommendation were communicated with Dr. Wandy Nagy who will take it from here.  Patient has follow-up with Dr. Zamorano in endocrinology on  and will establish care with Adamaris Thompson RN CDE at the [facility] on . Message left for Adamaris to call me back to initiate warm handoff of patient care.     Juliette Kuhn RD,  JAYSHREE DURAN  3/24/2020   12:00 PM

## 2020-03-26 ENCOUNTER — OFFICE VISIT (OUTPATIENT)
Dept: ENDOCRINOLOGY | Facility: CLINIC | Age: 35
End: 2020-03-26
Attending: FAMILY MEDICINE
Payer: COMMERCIAL

## 2020-03-26 ENCOUNTER — COMMUNICATION - HEALTHEAST (OUTPATIENT)
Dept: EDUCATION SERVICES | Facility: CLINIC | Age: 35
End: 2020-03-26

## 2020-03-26 ENCOUNTER — OFFICE VISIT - HEALTHEAST (OUTPATIENT)
Dept: FAMILY MEDICINE | Facility: CLINIC | Age: 35
End: 2020-03-26

## 2020-03-26 VITALS
DIASTOLIC BLOOD PRESSURE: 75 MMHG | BODY MASS INDEX: 33.18 KG/M2 | SYSTOLIC BLOOD PRESSURE: 110 MMHG | HEART RATE: 85 BPM | HEIGHT: 60 IN | WEIGHT: 169 LBS

## 2020-03-26 DIAGNOSIS — O24.419 GESTATIONAL DIABETES: Primary | ICD-10-CM

## 2020-03-26 DIAGNOSIS — O24.414 INSULIN CONTROLLED GESTATIONAL DIABETES MELLITUS (GDM) IN THIRD TRIMESTER: ICD-10-CM

## 2020-03-26 DIAGNOSIS — R94.6 ABNORMAL FINDING ON THYROID FUNCTION TEST: Primary | ICD-10-CM

## 2020-03-26 DIAGNOSIS — Z34.83 ENCOUNTER FOR SUPERVISION OF OTHER NORMAL PREGNANCY, THIRD TRIMESTER: ICD-10-CM

## 2020-03-26 DIAGNOSIS — R94.6 ABNORMAL FINDING ON THYROID FUNCTION TEST: ICD-10-CM

## 2020-03-26 LAB
T3 SERPL-MCNC: 122 NG/DL (ref 60–181)
T4 FREE SERPL-MCNC: 0.77 NG/DL (ref 0.76–1.46)
T4 SERPL-MCNC: 8 UG/DL (ref 4.5–13.9)
THYROPEROXIDASE AB SERPL-ACNC: <10 IU/ML
TSH SERPL DL<=0.005 MIU/L-ACNC: 0.25 MU/L (ref 0.4–4)

## 2020-03-26 RX ORDER — CETIRIZINE HYDROCHLORIDE 10 MG/1
TABLET ORAL
COMMUNITY
Start: 2020-03-16 | End: 2020-03-26

## 2020-03-26 RX ORDER — MULTIVIT WITH MINERALS/LUTEIN
1000 TABLET ORAL DAILY
COMMUNITY

## 2020-03-26 RX ORDER — PEN NEEDLE, DIABETIC 32GX 5/32"
NEEDLE, DISPOSABLE MISCELLANEOUS
Qty: 100 EACH | Refills: 1 | Status: SHIPPED | OUTPATIENT
Start: 2020-03-26 | End: 2020-10-23

## 2020-03-26 RX ORDER — ZINC GLUCONATE 50 MG
50 TABLET ORAL DAILY
COMMUNITY

## 2020-03-26 RX ORDER — DIPHENHYDRAMINE HCL 25 MG
25 CAPSULE ORAL EVERY 6 HOURS PRN
COMMUNITY

## 2020-03-26 RX ORDER — PNV NO.95/FERROUS FUM/FOLIC AC 28MG-0.8MG
1 TABLET ORAL DAILY
COMMUNITY
End: 2021-06-30

## 2020-03-26 RX ORDER — POLYETHYLENE GLYCOL 3350 17 G/17G
1 POWDER, FOR SOLUTION ORAL DAILY
COMMUNITY
End: 2021-06-30

## 2020-03-26 RX ORDER — CYANOCOBALAMIN (VITAMIN B-12) 500 MCG
TABLET ORAL
COMMUNITY
End: 2021-06-30

## 2020-03-26 ASSESSMENT — MIFFLIN-ST. JEOR: SCORE: 1388.08

## 2020-03-26 ASSESSMENT — PAIN SCALES - GENERAL: PAINLEVEL: NO PAIN (0)

## 2020-03-26 NOTE — NURSING NOTE
Chief Complaint   Patient presents with     New Patient     HYPERTHYROIDISM     Beulah Jennings CMA

## 2020-03-26 NOTE — PROGRESS NOTES
Endocrinology Note         Neha is a 34 year old female presents today for gestational diabetes and abnormal thyroid function.    HPI  Neha is a 34 year old female presents today for gestational diabetes and abnormal thyroid function    She is currently pregnant with her fourth child.  She is 30 weeks pregnancy.  She is still on June 4, 2020.    1) Gestational diabetes: She failed 50 g of glucose tolerance test on 3/12/2020.  Blood glucose was 160 about 1 hour after 50 g glucose.  She was then referred to diabetic educator and nutritionist on 3/13/2020.  She has educated on eating balanced meals and checking blood glucose 4 times a day and checking ketones once a week.  She is usually eat high carbohydrate diet such as pancakes, bagel with cream cheese, Welsh toast, sandwich, rice.  She does not have structured exercise. Please refer to detailed dietary recall in nutritionist's note on 3/13/2020.  Since she met with nutritionist, she had tried to reduce portion of carbohydrate has been quite hungry.  She has positive ketones morning.    She did have GDM in previous pregnancies: 2014, 2015 and 2016 required insulin in all of them.  She denies having diabetes outside of pregnancy.  She denied family history of diabetes.    Reviewed glucose log, FBG is at goal but 2 hours post meal glucoses were elevated (140-190 range). Average glucose 134 mg/dl. The lowest BG 86 and the highest . No hypoglycemia.    2) abnormal thyroid function test: She stated she had abnormal thyroid function test few years ago.  I have reviewed her chart in care everywhere.  She saw endocrinologist in 2015 because of suppressed TSH.  Her abnormal thyroid function test was thought to be due to pregnancy.    Labs are as follows  7/28/2015: TSH 0.09, free T4 0.7  8/6/2015: TSH 0.09, free T4 0.7  9/28/2015: TSH 0.53, free T4 0.7, free T3 2.5  12/16/2015: TSH 0.34, free T4 0.8, free T3 2.1  2/9/2016: TSH 0.53  9/6/2016: TSH 0.6  11/8/2016:  TSH 0.1, free T4 0.8  12/1/2016: TSH 0.14, free T4 0.7  1/30/2017: TSH 0.17, free T4 0.7  5/9/2017: TSH 0.55  9/28/2017: TSH 0.63, free T4 0.9  8/20/2018: TSH 0.62, free T4 0.8, total T4 6.0, total T3 is 44  8/5/2019: TSH 0.6, free T4 0.9  9/30/2019: TSH 0.62  2/21/2020: TSH 0.17, free T4 0.6, total T4 5.7, total T3 101    She has been feeling fatigue, unable to sleep well, hair loss, tingling in hands, feet and in her body intermittently for the past 6 months and has been feeing better lately. She has constipation but now improved. She denied family history of thyroid disease.     Past Medical History  Past Medical History:   Diagnosis Date     Gestational diabetes      NO ACTIVE PROBLEMS      Allergies  No Known Allergies     Medications  Current Outpatient Medications   Medication Sig Dispense Refill     acetone, Urine, test (KETOSTIX) STRP 1 strip by In Vitro route once a week Test urine weekly 30 strip 1     blood glucose (ACCU-CHEK GUIDE) test strip Use to test blood sugar 4 times daily or as directed. 300 each 3     blood glucose monitoring (ACCU-CHEK FASTCLIX) lancets Use to test blood sugar 4 times daily or as directed.  Ok to substitute alternative if insurance prefers. 306 each 3     Blood Glucose Monitoring Suppl (BL BLOOD GLUCOSE MONITOR KIT) W/DEVICE KIT 1 kit 4 times daily Test blood sugar four times daily 1 kit 0     COMFORT LANCETS MISC 1 each 4 times daily Test blood sugars four times daily 120 each 3     cyanocobalamin (VITAMIN B-12) 25 mcg TABS quarter-tab Take 1,000 mcg by mouth daily       diphenhydrAMINE (BENADRYL) 25 MG capsule Take 25 mg by mouth every 6 hours as needed for itching or allergies       docusate sodium (COLACE) 50 MG capsule Take 50 mg by mouth 2 times daily       Ferrous Sulfate (IRON) 325 (65 Fe) MG tablet Take 1 tablet by mouth daily       fluticasone (ARNUITY ELLIPTA) 100 MCG/ACT inhaler Inhale 1 puff into the lungs daily       folic acid 0.8 MG CAPS        glucose blood VI  test strips strip Test blood sugar 4 times daily 1 Box 12     Misc. Devices (BREAST PUMP) MISC 1 each daily as needed 1 Device 0     polyethylene glycol (MIRALAX) packet Take 1 packet by mouth daily       PRENATAL VITAMINS PO Take  by mouth.       vitamin C (ASCORBIC ACID) 1000 MG TABS Take 1,000 mg by mouth daily       zinc gluconate 50 MG tablet Take 50 mg by mouth daily       glyBURIDE (DIABETA / MICRONASE) 2.5 MG tablet Take 1 tablet (2.5 mg) by mouth 3 times daily (with meals) (Patient not taking: Reported on 3/26/2020) 30 tablet 1     glyBURIDE (DIABETA / MICRONASE) 2.5 MG tablet Take 2.5 mg with breakfast and 1.25 mg with lunch. (Patient not taking: Reported on 3/26/2020) 30 tablet 2     ondansetron (ZOFRAN ODT) 4 MG disintegrating tablet Take 1 tablet (4 mg) by mouth every 6 hours as needed for nausea (Patient not taking: Reported on 3/26/2020) 20 tablet 1     Urine Glucose-Ketones Test (KETO-DIASTIX) STRP 1 strip by In Vitro route daily For one week and then once weekly thereafter (Patient not taking: Reported on 3/26/2020) 50 each 0     Family History  family history includes Cerebrovascular Disease in her mother; Hypertension in her mother; Myocardial Infarction in her father.   No family history of diabetes or thyroid disease    Social History  Social History     Tobacco Use     Smoking status: Former Smoker     Types: Cigarettes     Smokeless tobacco: Never Used     Tobacco comment: Quit in 2008   Substance Use Topics     Alcohol use: No     Drug use: No   works as a  at [employer]    ROS  Constitutional: gradual weight gain as she is currently pregnant, low energy  Eyes: no vision change, diplopia or red eyes   Neck: no difficulty swallowing, no choking, no neck pain, no neck swelling  Cardiovascular: no chest pain, palpitations  Respiratory: no dyspnea, cough, shortness of breath or wheezing   GI: no nausea, vomiting, diarrhea or constipation, no abdominal pain   : no  change in urine, no dysuria or hematuria  Musculoskeletal: no joint or muscle pain or swelling   Integumentary: no concerning lesions  Neuro: no loss of strength or sensation, + numbness and tingling in her hands and feet, no tremor, no dizziness, no headache   Endo: no polyuria or polydipsia, no temperature intolerance   Heme/Lymph: no concerning bumps, no bleeding problems   Allergy: no environmental allergies   Psych: +insomnia    Physical Exam  /75   Pulse 85   Ht 1.524 m (5')   Wt 76.7 kg (169 lb)   BMI 33.01 kg/m    Body mass index is 33.01 kg/m .  Constitutional: no distress, comfortable, pleasant   Eyes: anicteric, normal extra-ocular movements, no lid lag or retraction  Neck: +mild generous thyroid gland, no discrete nodule  Cardiovascular: regular rate and rhythm, normal S1 and S2, no murmurs  Respiratory: clear to auscultation, no wheezes or crackles, normal breath sounds   Gastrointestinal:  nontender, +gravid, no masses   Musculoskeletal: no edema   Skin: no concerning lesions, no jaundice   Neurological: cranial nerves intact, 1+reflexes at patella, normal gait, no tremor on outstretched hands bilaterally  Psychological: appropriate mood   Lymphatic: no cervical  lymphadenopathy.      RESULTS  I have personally reviewed labs and images. I also reviewed labs with patient and discussed the result and plan of care.    US thyroid 9/18/2015  Right thyroid lobe measures 5.6 x 2.4 x 1.7 cm. Equivocal mild coarsening of the thyroid parenchymal echotexture. Normal vascularity at Doppler. No focal nodule.      Left thyroid lobe measures 5.2 x 2.1 x 1.4 cm. Equivocal mild coarsening of the thyroid parenchymal echotexture. Normal vascularity at Doppler. No focal nodule.    Isthmus measures 2 mm. No additional findings. No cervical lymphadenopathy.    CONCLUSION:  1.  Equivocal mild coarsening of the thyroid parenchymal echotexture. Normal vascularity at Doppler. No focal nodule.    Thyroid lab from  outside [facility]   7/28/2015: TSH 0.09, free T4 0.7  8/6/2015: TSH 0.09, free T4 0.7  9/28/2015: TSH 0.53, free T4 0.7, free T3 2.5  12/16/2015: TSH 0.34, free T4 0.8, free T3 2.1  2/9/2016: TSH 0.53  9/6/2016: TSH 0.6  11/8/2016: TSH 0.1, free T4 0.8  12/1/2016: TSH 0.14, free T4 0.7  1/30/2017: TSH 0.17, free T4 0.7  5/9/2017: TSH 0.55  9/28/2017: TSH 0.63, free T4 0.9  8/20/2018: TSH 0.62, free T4 0.8, total T4 6.0, total T3 is 44  8/5/2019: TSH 0.6, free T4 0.9  9/30/2019: TSH 0.62  2/21/2020: TSH 0.17, free T4 0.6, total T4 5.7, total T3 101    ASSESSMENT:    Neha is a 34 year old female presents today for gestational diabetes and abnormal thyroid function    1) Gestational diabetes: She failed 50 g of glucose tolerance test on 3/12/2020. She had hx of GDM with all previous pregnancies. Reviewed glucose log, FBG at goal but 2 hours glucose post meal was between 140-190. Given suboptimal control, positive ketone, I will start her on NPH 4 units with breakfast. She will continue to check fasting glucose and 1-2 hours after meal.     2) Abnormal thyroid function test: she did have abnormal thyroid function since 2015 consistent with subclinical hyperthyroidism. Most of TFTs were checked during pregnancy. This was thought to be related to pregnancy. She is clinically euthyroid. I would repeat lab again today and will add thyroid antibodies and determine the next step.    PLAN:   - recheck TSH, FT4, TT3, TT4, TSI and TPO Ab  - start NPH 4 units in the morning  - continue to check FBG, and 1-2 hours after meal  - set up telephone visit next week    Jaun Gallagher MD  Division of Diabetes and Endocrinology  Department of Medicine  [phone]

## 2020-03-26 NOTE — PATIENT INSTRUCTIONS
- start NPH 4 units with breakfast  - lab today for thyroid    - set up phone visit in 1 week    If you have any questions, please do not hesitate to call clinic line at [phone] and ask for Endocrinology clinic.  If you need to fax, please fax to clinic fax number at [phone]    After clinic hours or weekends, please contact [phone] and ask for Endocrinologist-on call      Sincerely,    Jaun Gallagher MD  Endocrinology

## 2020-03-27 ENCOUNTER — TELEPHONE (OUTPATIENT)
Dept: ENDOCRINOLOGY | Facility: CLINIC | Age: 35
End: 2020-03-27

## 2020-03-27 NOTE — TELEPHONE ENCOUNTER
Humulin N kwikpen was not covered by insurance but I called in a Cynthia voucher and she will get 5 pens FREE. She was notified to  at pharmacy Mariel Fregoso RN on 3/27/2020 at 3:53 PM  .

## 2020-03-27 NOTE — TELEPHONE ENCOUNTER
[facility] Call Center    Phone Message    May a detailed message be left on voicemail: yes     Reason for Call: Medication Question or concern regarding medication   Prescription Clarification  Name of Medication: insulin isophane human (HUMULIN N PEN) 100 UNIT/ML injection   Prescribing Provider: Dr. Zamorano   Pharmacy: [id number] [address] AT [facility] & [place] [address]   What on the order needs clarification? Pt states her pharmacy has not received this medication, but her  was able to  the pen needles. Please advise.    Action Taken: Message routed to:  [facility] Endocrinology    Travel Screening: Not Applicable

## 2020-03-30 LAB — TSI SER-ACNC: <1 TSI INDEX

## 2020-04-01 ENCOUNTER — APPOINTMENT (OUTPATIENT)
Dept: INTERPRETER SERVICES | Facility: CLINIC | Age: 35
End: 2020-04-01
Payer: COMMERCIAL

## 2020-04-01 NOTE — PROGRESS NOTES
"Neha Mcginnis is a 34 year old female who is being evaluated via a billable telephone visit.      The patient has been notified of following:     \"This telephone visit will be conducted via a call between you and your physician/provider. We have found that certain health care needs can be provided without the need for a physical exam.  This service lets us provide the care you need with a short phone conversation.  If a prescription is necessary we can send it directly to your pharmacy.  If lab work is needed we can place an order for that and you can then stop by our lab to have the test done at a later time.    If during the course of the call the physician/provider feels a telephone visit is not appropriate, you will not be charged for this service.\"     Patient has given verbal consent for Telephone visit?  Yes    Neha Mcginnis complains of    Chief Complaint   Patient presents with     RECHECK     return gestational diabetes       I have reviewed and updated the patient's Past Medical History, Social History, Family History and Medication List.    ALLERGIES  Patient has no known allergies.    Additional provider notes: Neha is a 34 year old female for follow up gestational diabetes and abnormal thyroid function  She is currently pregnant with her fourth child.  She is 30 weeks pregnancy.  She is still on June 4, 2020.    1) Gestational diabetes: She failed 50 g of glucose tolerance test on 3/12/2020.  Blood glucose was 160 about 1 hour after 50 g glucose. She did have GDM in previous pregnancies: 2014, 2015 and 2016 required insulin in all of them.  She denies having diabetes outside of pregnancy.  She denied family history of diabetes.     At last visit, I start NPH 4 units with breakfast. However, her glucoses were still elevated. She then increased NPH to 8 units with breakfast and 8 units with lunch on the last weekend. She said that her lunch is usually American food which has a lot of carbohydrate. NO " hypoglycemia.    Reviewed glucose log as follows;    Week of__/__/__ Sunday  __/__  Monday  3__/23__ Tuesday  _3_/24__ Wednesday  _3_/_25_ Thursday  _3_/26__ Friday  3__/27__ Saturday  _3_/_28_    Time BG Time BG Time BG Time BG Time BG Time BG Time BG      7:47am 198 5:37am 94 6:46am 110 6:28am 88 7:04am 96 8:33am 95      12:00pm 152 7:51am 185 7:50am 105 9:18am 130 11:21am 119 10:11am 195        1:00pm 115 12:15pm 135 2:17pm 159 4:26pm 170 2:31pm 152        6:17pm 127 6:38pm 143     8:11pm 128     Week of__/__/__ Sunday  _3_/_29_ Monday  3__/30__ Tuesday  _3_/_31_ Wednesday  _4_/_1_ Thursday  4__/_2_ Friday  __/__ Saturday  __/__    Time BG Time BG Time BG Time BG Time BG Time BG Time BG    6:00am 105 5:30am 107 5:30am 86 5:36am 107 7:30am 88        12:13pm 171 7:54am 185 7:41am 95 7:44am 151          2:50pm 99 12:07pm 140 7:49pm 147 12:30pm 134          730pm 174 6:05pm 122   8:00pm 151           2) abnormal thyroid function test: She stated she had abnormal thyroid function test few years ago.  I have reviewed her chart in care everywhere.  She saw endocrinologist in 2015 because of suppressed TSH.  Her abnormal thyroid function test was thought to be due to pregnancy.    Labs are as follows  7/28/2015: TSH 0.09, free T4 0.7  8/6/2015: TSH 0.09, free T4 0.7  9/28/2015: TSH 0.53, free T4 0.7, free T3 2.5  12/16/2015: TSH 0.34, free T4 0.8, free T3 2.1  2/9/2016: TSH 0.53  9/6/2016: TSH 0.6  11/8/2016: TSH 0.1, free T4 0.8  12/1/2016: TSH 0.14, free T4 0.7  1/30/2017: TSH 0.17, free T4 0.7  5/9/2017: TSH 0.55  9/28/2017: TSH 0.63, free T4 0.9  8/20/2018: TSH 0.62, free T4 0.8, total T4 6.0, total T3 is 44  8/5/2019: TSH 0.6, free T4 0.9  9/30/2019: TSH 0.62  2/21/2020: TSH 0.17, free T4 0.6, total T4 5.7, total T3 101     Lab at the last visit (3/26/2020) showed TSH 0.25, FT4 0.77, total T3 8.0 (4.5-13.9), TT3 122, TPO <10, TSI <1.0.    She is feeling fine with some fatigue.    10 points ROS were negative  otherwise documented in HPI above    Physical exam deferred due to telephone visit    Lab and ultrasound reviewed    ASSESSMENT:    Neha is a 34 year old female presents today for gestational diabetes and abnormal thyroid function     1) Gestational diabetes: She failed 50 g of glucose tolerance test on 3/12/2020. She had hx of GDM with all previous pregnancies. Reviewed glucose log, FBG at goal but 2 hours glucose post meal was between 140-190. Given suboptimal control, NPH was started last week. Patient titrated insulin up and is currently taking 8 units with breakfast and 8 units with lunch.   - due to above target BG, I will further NPH 15 units with breakfast and 12 units with dinner. Ask her to take it with dinner instead  - she will continue with light exercise and healthy diet  - She will continue to check fasting glucose and 1-2 hours after meal.      2) Abnormal thyroid function test: she did have abnormal thyroid function since 2015 consistent with subclinical hyperthyroidism. Most of TFTs were checked during pregnancy. This was thought to be related to pregnancy.   - lab at the last visit (3/26/2020) was consistent with subclinical hyperthyroidism likely related to pregnancy  - plan to repeat 6-8 weeks after delivery     PLAN:   - increase NPH 15 units with breakfast and 12 units with dinner  - continue to check FBG, and 1-2 hours after meal  - plan to repeat 6-8 weeks after delivery  - set up telephone visit next week     Time start: 14:45 pm  Time end: 15:00 pm  Phone call duration: 15 minutes    Jaun Gallagher MD  Division of Diabetes and Endocrinology  Department of Medicine

## 2020-04-02 ENCOUNTER — AMBULATORY - HEALTHEAST (OUTPATIENT)
Dept: EDUCATION SERVICES | Facility: CLINIC | Age: 35
End: 2020-04-02

## 2020-04-02 ENCOUNTER — TELEPHONE (OUTPATIENT)
Dept: ENDOCRINOLOGY | Facility: CLINIC | Age: 35
End: 2020-04-02

## 2020-04-02 DIAGNOSIS — O24.414 INSULIN CONTROLLED GESTATIONAL DIABETES MELLITUS (GDM) DURING PREGNANCY, ANTEPARTUM: ICD-10-CM

## 2020-04-02 NOTE — TELEPHONE ENCOUNTER
[facility] Call Center    Phone Message    May a detailed message be left on voicemail: yes     Reason for Call: Other: Adamaris with [facility] is requesting a call back from Dr. Zamorano and the care team. Adamaris is a diabetic educator with the Pt's OB. Adamaris has some confusion on who will be handling the Pt's gestational diabetic care. Please call back to discuss and clarify.     Action Taken: Message routed to:  [facility] endo    Travel Screening: Not Applicable

## 2020-04-02 NOTE — PATIENT INSTRUCTIONS
Increase NPH to 15 units with breakfast and 12 units with dinner  Set up phone or VDO visit next Thursday at 10 am    If you have any questions, please do not hesitate to call clinic line at [phone] and ask for Endocrinology clinic.  If you need to fax, please fax to clinic fax number at [phone]    After clinic hours or weekends, please contact [phone] and ask for Endocrinologist-on call      Sincerely,    Jaun Gallagher MD  Endocrinology

## 2020-04-02 NOTE — TELEPHONE ENCOUNTER
Spoke w/ FABRICIO Bailon/DE for [facility], they received a referral from OB for diabetes care, Family Medicine providers, want to ensure that Dr Zamorano will be managing Pts diabetes. Pt currently taking multiple doses of NPH with meals. Her numbers are acceptable, but they are concerned about the NPH. Pt has appt with Dr Zamorano 04/03/2020 at 12noon.   Provider notified.   Es Meyers RN on 4/2/2020 at 2:38 PM

## 2020-04-02 NOTE — NURSING NOTE
Chief Complaint   Patient presents with     RECHECK     return gestational diabetes     Beulah Jennings CMA

## 2020-04-02 NOTE — TELEPHONE ENCOUNTER
[facility] [phone] Adamaris, diabetic educator with the Pt's OB; left message to please have her contact us with specific questions. Left nursing phone number.   Es Meyers, RN on 4/2/2020 at 12:12 PM

## 2020-04-03 ENCOUNTER — VIRTUAL VISIT (OUTPATIENT)
Dept: ENDOCRINOLOGY | Facility: CLINIC | Age: 35
End: 2020-04-03
Payer: COMMERCIAL

## 2020-04-03 DIAGNOSIS — O24.419 GESTATIONAL DIABETES: ICD-10-CM

## 2020-04-03 DIAGNOSIS — O24.414 INSULIN CONTROLLED GESTATIONAL DIABETES MELLITUS (GDM) IN THIRD TRIMESTER: ICD-10-CM

## 2020-04-03 DIAGNOSIS — E05.90 SUBCLINICAL HYPERTHYROIDISM: Primary | ICD-10-CM

## 2020-04-09 ENCOUNTER — PRENATAL OFFICE VISIT - HEALTHEAST (OUTPATIENT)
Dept: FAMILY MEDICINE | Facility: CLINIC | Age: 35
End: 2020-04-09

## 2020-04-09 ENCOUNTER — VIRTUAL VISIT (OUTPATIENT)
Dept: ENDOCRINOLOGY | Facility: CLINIC | Age: 35
End: 2020-04-09
Payer: COMMERCIAL

## 2020-04-09 ENCOUNTER — TELEPHONE (OUTPATIENT)
Dept: ENDOCRINOLOGY | Facility: CLINIC | Age: 35
End: 2020-04-09

## 2020-04-09 DIAGNOSIS — O09.892 SUPERVISION OF OTHER HIGH RISK PREGNANCIES, SECOND TRIMESTER: ICD-10-CM

## 2020-04-09 DIAGNOSIS — E03.8 SUBCLINICAL HYPOTHYROIDISM: ICD-10-CM

## 2020-04-09 DIAGNOSIS — K59.00 CONSTIPATION, UNSPECIFIED CONSTIPATION TYPE: ICD-10-CM

## 2020-04-09 DIAGNOSIS — O99.810 ABNORMAL MATERNAL GLUCOSE TOLERANCE, ANTEPARTUM: Primary | ICD-10-CM

## 2020-04-09 NOTE — PROGRESS NOTES
"Neha Mcginnis is a 34 year old female who is being evaluated via a billable telephone visit.      The patient has been notified of following:     \"This telephone visit will be conducted via a call between you and your physician/provider. We have found that certain health care needs can be provided without the need for a physical exam.  This service lets us provide the care you need with a short phone conversation.  If a prescription is necessary we can send it directly to your pharmacy.  If lab work is needed we can place an order for that and you can then stop by our lab to have the test done at a later time.    Telephone visits are billed at different rates depending on your insurance coverage. During this emergency period, for some insurers they may be billed the same as an in-person visit.  Please reach out to your insurance provider with any questions.    If during the course of the call the physician/provider feels a telephone visit is not appropriate, you will not be charged for this service.\"    Patient has given verbal consent for Telephone visit?  Yes    How would you like to obtain your AVS? Mail a copy    Neha Mcginnis complains of    Chief Complaint   Patient presents with     RECHECK     GDM       I have reviewed and updated the patient's Past Medical History, Social History, Family History and Medication List.    ALLERGIES  Patient has no known allergies.    Additional provider notes:   Neha is a 34 year old female for follow up gestational diabetes and abnormal thyroid function     She is currently pregnant with her fourth child.  She is 32 weeks pregnancy.  She is due on June 4, 2020.     1) Gestational diabetes: She failed 50 g of glucose tolerance test on 3/12/2020.  Blood glucose was 160 about 1 hour after 50 g glucose. She did have GDM in previous pregnancies: 2014, 2015 and 2016 required insulin in all of them.  She denies having diabetes outside of pregnancy.  She denied family history of diabetes. "      Interim history  At last visit, I increased NPH 15 units before breakfast and NPH 12 units before supper. She stated that she noticed face/lip and tongue numbness about 2-3 hours after taking NPH. She did not check BG when the symptoms occurred. She did have hypoglycemic symptom randomly but did not check BG. Her lunch is usually American food which has a lot of carbohydrate.      Reviewed glucose log as follows;      Week of__/__/__ Date  _4_/_3_  Date  4__/_4_ Date  _4_/_5_ Date  _4_/_6_ Date  _4_/7__ Date  4__/_8_ Date  4__9/__    Time BG Time BG Time BG Time BG Time BG Time BG Time BG    7AM 90 6:18AM 88 6:17AM 105 5:24AM 97 5:25AM 92 9:04AM 108 7:52AM 75    10:10AM 161 10:00AM 114 9:30AM 188 12:18PM 142 9:09AM 197 12:13PM 125      2:17PM 132 2:00PM 165 12:10PM 94 3:19PM 105 6:30PM 113        5:15PM 140 7:47PM 108 4PM 149             Week of__/__/__ Date  __/__  Date  __/__ Date  __/__ Date  __/__ Date  __/__ Date  __/__ Date  __/__    Time BG Time BG Time BG Time BG Time BG Time BG Time BG                                                                         2) abnormal thyroid function test: She stated she had abnormal thyroid function test few years ago.  I have reviewed her chart in care everywhere.  She saw endocrinologist in 2015 because of suppressed TSH.  Her abnormal thyroid function test was thought to be due to pregnancy.    Labs are as follows  7/28/2015: TSH 0.09, free T4 0.7  8/6/2015: TSH 0.09, free T4 0.7  9/28/2015: TSH 0.53, free T4 0.7, free T3 2.5  12/16/2015: TSH 0.34, free T4 0.8, free T3 2.1  2/9/2016: TSH 0.53  9/6/2016: TSH 0.6  11/8/2016: TSH 0.1, free T4 0.8  12/1/2016: TSH 0.14, free T4 0.7  1/30/2017: TSH 0.17, free T4 0.7  5/9/2017: TSH 0.55  9/28/2017: TSH 0.63, free T4 0.9  8/20/2018: TSH 0.62, free T4 0.8, total T4 6.0, total T3 is 44  8/5/2019: TSH 0.6, free T4 0.9  9/30/2019: TSH 0.62  2/21/2020: TSH 0.17, free T4 0.6, total T4 5.7, total T3 101     Lab at the last visit  (3/26/2020) showed TSH 0.25, FT4 0.77, total T3 8.0 (4.5-13.9), TT3 122, TPO <10, TSI <1.0.     She is feeling fine with some fatigue.     10 points ROS were negative otherwise documented in HPI above     Physical exam deferred due to telephone visit     Lab and ultrasound reviewed     ASSESSMENT:    Neha is a 34 year old female who has telephone visit today for gestational diabetes      1) Gestational diabetes: She failed 50 g of glucose tolerance test on 3/12/2020. She had hx of GDM with all previous pregnancies. Reviewed glucose log, FBG at goal but 2 hours glucoses post meal were not at goal.   - due to above target BG, I will further NPH 17 units with breakfast and continue 12 units before dinner.   - she will continue with light exercise and healthy diet  - She will continue to check fasting glucose and 1-2 hours after meal.      2) Abnormal thyroid function test: she did have abnormal thyroid function since 2015 consistent with subclinical hyperthyroidism. Most of TFTs were checked during pregnancy. This was thought to be related to pregnancy.   - lab at the last visit (3/26/2020) was consistent with subclinical hyperthyroidism likely related to pregnancy  - plan to repeat 6-8 weeks after delivery     PLAN:   - increase NPH 17 units with breakfast and 12 units before dinner  - continue to check FBG, and 1-2 hours after meal  - plan to repeat 6-8 weeks after delivery  - set up telephone visit next 2 weeks     Time start: 10:00 am  Time end: 10:12 am  Phone call duration: 12 minutes     Jaun Gallagher MD  Division of Diabetes and Endocrinology  Department of Medicine

## 2020-04-09 NOTE — TELEPHONE ENCOUNTER
----- Message from Jaun Gallagher MD sent at 4/9/2020 10:52 AM CDT -----  Regarding: NPH PA need?  Hello,    I refill NPH for this pt. She said that she needs PA. Can we start PA for her? She has GDM. Due date is June 4.     Thanks,Jaun

## 2020-04-09 NOTE — PATIENT INSTRUCTIONS
- increase NPH to 17 units in the morning before breakfast  - continue NPH 12 units before supper    - schedule phone appointment in 2 weeks (Thursday 4/23)    If you have any questions, please do not hesitate to call clinic line at [phone] and ask for Endocrinology clinic.  If you need to fax, please fax to clinic fax number at [phone]    After clinic hours or weekends, please contact [phone] and ask for Endocrinologist-on call      Sincerely,    Jaun Gallagher MD  Endocrinology

## 2020-04-09 NOTE — TELEPHONE ENCOUNTER
Neha still has 4 pens of the humulin N Kwikpen from a voucher filled 3/27/20. I will have to wait  for the pharmancy to have a denial to attempt a PA. She is ok with using Novolin N vials/ syringe when the pens run out so a PA would not be approved . I did go over vial / syringe method with her at last appointment and she has done it before . It may be best to wait until she is out to send in new order for Novollin N vials. Mariel Fregoso RN on 4/9/2020 at 11:16 AM

## 2020-04-10 ENCOUNTER — TELEPHONE (OUTPATIENT)
Dept: ENDOCRINOLOGY | Facility: CLINIC | Age: 35
End: 2020-04-10

## 2020-04-10 NOTE — TELEPHONE ENCOUNTER
LVM for pt to c/b to schedule overbook with Dr Zamorano on 4/24 schedule is the same as video/telephone at 11:50 or 3:20.

## 2020-04-10 NOTE — TELEPHONE ENCOUNTER
Prior Authorization Retail Medication Request    Medication/Dose: Humulin N U-100   ICD code (if different than what is on RX):  Previously Tried and Failed: O24.419  Rationale:Patient needs to manage gestational diabetes    Insurance Name:[employer]  Insurance ID: [id number]       Pharmacy Information (if different than what is on RX)  Name:    Phone:

## 2020-04-13 NOTE — TELEPHONE ENCOUNTER
PA Initiation    Medication: Humulin N U-100 KwikPen -   Insurance Company: [employer] - Phone [phone] Fax [phone]  Pharmacy Filling the Rx: [id number] [place] [address] AT [place] [address]  Filling Pharmacy Phone: [phone]  Filling Pharmacy Fax: [phone]  Start Date: 4/13/2020

## 2020-04-14 DIAGNOSIS — O99.810 ABNORMAL MATERNAL GLUCOSE TOLERANCE, ANTEPARTUM: Primary | ICD-10-CM

## 2020-04-14 RX ORDER — HUMAN INSULIN 100 [IU]/ML
INJECTION, SUSPENSION SUBCUTANEOUS
Qty: 10 ML | Refills: 3 | Status: SHIPPED | OUTPATIENT
Start: 2020-04-14 | End: 2020-04-17

## 2020-04-14 RX ORDER — NAPROXEN SODIUM 220 MG
TABLET ORAL
Qty: 100 EACH | Refills: 1 | Status: SHIPPED | OUTPATIENT
Start: 2020-04-14 | End: 2020-10-23

## 2020-04-14 NOTE — TELEPHONE ENCOUNTER
"PRIOR AUTHORIZATION DENIED    Medication: Humulin N U-100 KwikPen - DENIED    Denial Date: 4/13/2020    Denial Rational:     Patient must have a history of trial & failure to or have a contraindication and/or intolerance 2 of the formulary alternative(s).  Formulary Alternative(s):  Humalog Vial, Humalog Mix Vial, Humulin Vial, Humulin 70-30 Vial, Novolin Vial, Novolin 70/30 Vial, Novolog (Pen, Vial, Cartridge)        Appeal Information:     **Please advise if appeal is necessary and place a letter of medical necessity with clinical rationale under the \"letters\" tab in patient's chart and route back to [facility] [[id number]]          "

## 2020-04-14 NOTE — TELEPHONE ENCOUNTER
Orders for Novolin N  Vial sent to  [facility]. She is aware  how to use  Vial/ syringe. The pen form was denied in a PA. Mariel Fregoso RN on 4/14/2020 at 4:30 PM

## 2020-04-15 ENCOUNTER — TELEPHONE (OUTPATIENT)
Dept: ENDOCRINOLOGY | Facility: CLINIC | Age: 35
End: 2020-04-15

## 2020-04-15 NOTE — TELEPHONE ENCOUNTER
[facility] Call Center    Phone Message    May a detailed message be left on voicemail: yes     Reason for Call: Other: Pt called stating it takes her 2 hours to be able to eat after she takes her insulin. If she eats any sooner than 2 hours her sugars are high. Additionally pt stated this isnt completely covered by insurance. She has to pay out of pocket for syringes and is interested in changing insulin medications. Please review and follow up     Action Taken: Message routed to:  [facility] Endo    Travel Screening: Not Applicable

## 2020-04-16 NOTE — TELEPHONE ENCOUNTER
Left detailed message on PTs vm via Hmong speaking  requesting additional information including blood sugar results and left clinic number and  number.   Es Meyers RN on 4/16/2020 at 9:48 AM

## 2020-04-16 NOTE — TELEPHONE ENCOUNTER
[facility] Call Center    Phone Message    May a detailed message be left on voicemail: yes     Reason for Call: Other: Blood sugar results:  returning call -  04/10/2020: Fastin - 9:26am 161 w/ 15 units - 2:28pm after lunch: 165  - 7:49pm after dinner 121 w/ 18 units    2020: Fastin w/ 15 units- 3:07pm after lunch 117 - After dinner 132 w/15 units  2020: Fastin - 6:33pm after 204 w/18 units  2020: Fastin   2020: Fastin - 1:30pm 139 w/20 units - 7:22pm after dinner 158 w/20 units  04/15/2020: Fastin - 6:09pm after dinner 185 w/20 units  2020: Fastin - After lunch 138 w/ 20 units    Pt wants to know if she can get a different type pf insulin because pt does not want to wait 2 hours to eat after the insulin. Pt wants to eat sooner. Please call pt back to discuss this. Thank you. Pt does not need a [facility] .     Action Taken: Message routed to:  [facility] Endo    Travel Screening: Not Applicable

## 2020-04-17 ENCOUNTER — COMMUNICATION - HEALTHEAST (OUTPATIENT)
Dept: SCHEDULING | Facility: CLINIC | Age: 35
End: 2020-04-17

## 2020-04-17 ENCOUNTER — HOSPITAL ENCOUNTER (OUTPATIENT)
Dept: MEDSURG UNIT | Facility: CLINIC | Age: 35
Discharge: HOME | End: 2020-04-17
Attending: FAMILY MEDICINE | Admitting: FAMILY MEDICINE

## 2020-04-17 DIAGNOSIS — B96.89 BV (BACTERIAL VAGINOSIS): ICD-10-CM

## 2020-04-17 DIAGNOSIS — O99.810 ABNORMAL MATERNAL GLUCOSE TOLERANCE, ANTEPARTUM: ICD-10-CM

## 2020-04-17 DIAGNOSIS — N76.0 BV (BACTERIAL VAGINOSIS): ICD-10-CM

## 2020-04-17 LAB
ALBUMIN UR-MCNC: NEGATIVE MG/DL
APPEARANCE UR: CLEAR
BILIRUB UR QL STRIP: NEGATIVE
CLUE CELLS: ABNORMAL
COLOR UR AUTO: NORMAL
GLUCOSE BLDC GLUCOMTR-MCNC: 121 MG/DL (ref 70–139)
GLUCOSE UR STRIP-MCNC: NEGATIVE MG/DL
HGB UR QL STRIP: NEGATIVE
KETONES UR STRIP-MCNC: NEGATIVE MG/DL
LEUKOCYTE ESTERASE UR QL STRIP: NEGATIVE
NITRATE UR QL: NEGATIVE
PH UR STRIP: 7 [PH] (ref 4.5–8)
RUPTURE OF FETAL MEMBRANES BY ROM PLUS: NEGATIVE
SP GR UR STRIP: 1 (ref 1–1.03)
TRICHOMONAS, WET PREP: ABNORMAL
UROBILINOGEN UR STRIP-ACNC: NORMAL
YEAST, WET PREP: ABNORMAL

## 2020-04-17 RX ORDER — HUMAN INSULIN 100 [IU]/ML
INJECTION, SUSPENSION SUBCUTANEOUS
Qty: 30 ML | Refills: 1 | Status: SHIPPED | OUTPATIENT
Start: 2020-04-17 | End: 2020-04-23

## 2020-04-17 ASSESSMENT — MIFFLIN-ST. JEOR: SCORE: 1398.49

## 2020-04-17 NOTE — TELEPHONE ENCOUNTER
Spoke w/ Pt: Via [facility] Hmong speaking    NPH: takes 20units every morning.   Confirms understanding that Dr Zamorano thinks this is a good medication for her to be using and to take 15 minutes prior to meals. No other questions at this time but will call if she has concerns.   Provider notified RE morning dose  Es Meyers RN on 4/17/2020 at 11:05 AM

## 2020-04-17 NOTE — TELEPHONE ENCOUNTER
Neha is taking NPH 20 units at 5:30 Am  20 units 3:30 PM then once a week takes 10 units at 6-7 PM . Phone visit set up for 4/23/20 for availability . Asking for a quicker acting insulin . Do you want to wait until the telephone visit to make  Changes ? Mariel Fregoso RN on 4/17/2020 at 1:35 PM

## 2020-04-17 NOTE — TELEPHONE ENCOUNTER
Jaun Gallagher MD Schwendeman, Connie M, RN; [facility] Triage-    Caller: Unspecified (2 days ago,  2:37 PM)               Richa Floyd,     I called this morning and left message. Could you please call her to confirm the dose she is on for NPH in the morning? I think NPH is already a good medication and she can take about 15 minutes before her meal. No need to wait for 2 hours. Otherwise she needs to take Lantus and another 3 injections of Novolog each meal.     She may need some adjustment but please confirm her morning dose for me.     Thanks,  Jaun

## 2020-04-17 NOTE — TELEPHONE ENCOUNTER
Message left  to increase NPH 25 units AM and 22 units PM with meal . This will then be discussed at upcoming visit  And if need to change to another insulin is needed.     Per Jaun :   Jaun Gallagher MD Schwendeman, Connie M, RN    Caller: Unspecified (2 days ago,  2:37 PM)               She can increase NPH to 25 units in the morning and 22 units in the evening with meal.     Thanks,  Jaun Fregoso, RN on 4/17/2020 at 3:08 PM

## 2020-04-22 NOTE — PROGRESS NOTES
"Neha Mcginnis is a 35 year old female who is being evaluated via a billable telephone visit.      The patient has been notified of following:     \"This telephone visit will be conducted via a call between you and your physician/provider. We have found that certain health care needs can be provided without the need for a physical exam.  This service lets us provide the care you need with a short phone conversation.  If a prescription is necessary we can send it directly to your pharmacy.  If lab work is needed we can place an order for that and you can then stop by our lab to have the test done at a later time.    Telephone visits are billed at different rates depending on your insurance coverage. During this emergency period, for some insurers they may be billed the same as an in-person visit.  Please reach out to your insurance provider with any questions.    If during the course of the call the physician/provider feels a telephone visit is not appropriate, you will not be charged for this service.\"    Patient has given verbal consent for Telephone visit?  Yes    How would you like to obtain your AVS? Mail a copy       I have reviewed and updated the patient's Past Medical History, Social History, Family History and Medication List.    ALLERGIES  Patient has no known allergies.    Additional provider notes:   Neha is a 35 year old female for follow up gestational diabetes    She is currently pregnant with her fourth child. She is 34 weeks pregnancy.  She is due on June 4, 2020.     1) Gestational diabetes: She failed 50 g of glucose tolerance test on 3/12/2020.  Blood glucose was 160 about 1 hour after 50 g glucose. She did have GDM in previous pregnancies: 2014, 2015 and 2016 required insulin in all of them.  She denies having diabetes outside of pregnancy.  She denied family history of diabetes.      Interim history  She is now taking NPH 27 units before breakfast and NPH 27 units before supper. She check BG about 1 " hour after meal.  Her lunch is usually American food which has a lot of carbohydrate. She has not had hypoglycemia.     Reviewed glucose log as follows;    Week of__/__/__ Date  4__/_13_  Date  4__/14__ Date  _4_/__15 Date  _4_/16__ Date  _4_/_17_ Date  _4_/18__ Date  _4_/19__    Time BG Time BG Time BG Time BG Time BG Time BG Time BG    5:30am 84 5:40am 88 5:37am 99 8:41am 76 8:21am 78 5:51am 101 5:56am 91    7:49am 168 8:00am 102 8:07am 170 12:57pm 138   8:16am 149 8:20am 136    2:11pm 160 1:21am 139 1:47pm 113 8pm 96   11:52am 133 1:21pm 132      7:16pm 158 6pm 185     7:54pm 222 5:38pm 145     Week of__/__/__ Date  _4_/_20_  Date  _4_/_21_ Date  4__/22__ Date  _4_/23__ Date  __/__ Date  __/__ Date  __/__    Time BG Time BG Time BG Time BG Time BG Time BG Time BG    5:34am 92 5:39am 83 5:33am 88 7:30am 70          8:56am 147 8:49am 163 8:49am 116            2pm 157 7:42pm 179 7:38pm 161            5:49pm 170                   2) abnormal thyroid function test: She stated she had abnormal thyroid function test few years ago.  I have reviewed her chart in care everywhere.  She saw endocrinologist in 2015 because of suppressed TSH.  Her abnormal thyroid function test was thought to be due to pregnancy.    Labs are as follows  7/28/2015: TSH 0.09, free T4 0.7  8/6/2015: TSH 0.09, free T4 0.7  9/28/2015: TSH 0.53, free T4 0.7, free T3 2.5  12/16/2015: TSH 0.34, free T4 0.8, free T3 2.1  2/9/2016: TSH 0.53  9/6/2016: TSH 0.6  11/8/2016: TSH 0.1, free T4 0.8  12/1/2016: TSH 0.14, free T4 0.7  1/30/2017: TSH 0.17, free T4 0.7  5/9/2017: TSH 0.55  9/28/2017: TSH 0.63, free T4 0.9  8/20/2018: TSH 0.62, free T4 0.8, total T4 6.0, total T3 is 44  8/5/2019: TSH 0.6, free T4 0.9  9/30/2019: TSH 0.62  2/21/2020: TSH 0.17, free T4 0.6, total T4 5.7, total T3 101     Lab at the last visit (3/26/2020) showed TSH 0.25, FT4 0.77, total T3 8.0 (4.5-13.9), TT3 122, TPO <10, TSI <1.0.     She is feeling fine with some fatigue.     10  points ROS were negative otherwise documented in HPI above     Physical exam deferred due to telephone visit     Lab and ultrasound reviewed     ASSESSMENT:    Neha is a 35 year old female who has telephone visit today for gestational diabetes      1) Gestational diabetes: She failed 50 g of glucose tolerance test on 3/12/2020. She had hx of GDM with all previous pregnancies. Reviewed glucose log, FBG at goal but 2 hours glucoses post meal were not at goal.   - due to above target BG, I will further NPH 32 units with breakfast and continue 30 units before dinner.   - she will continue with light exercise and healthy diet  - She will continue to check fasting glucose and 1-2 hours after meal.      2) Abnormal thyroid function test: she did have abnormal thyroid function since 2015 consistent with subclinical hyperthyroidism. Most of TFTs were checked during pregnancy. This was thought to be related to pregnancy.   - lab at the last visit (3/26/2020) was consistent with subclinical hyperthyroidism likely related to pregnancy  - plan to repeat TFT 6-8 weeks after delivery     PLAN:   - increase NPH 32 units with breakfast and 30 units before dinner  - continue to check FBG, and 1-2 hours after meal  - plan to repeat TFT 6-8 weeks after delivery  - set up Digital H2O message to update blood glucose in 1 week  - set up telephone visit next 2 weeks     Time start: 8:00 am  Time end: 8:07 am  Phone call duration: 7 minutes     Jaun Gallagher MD  Division of Diabetes and Endocrinology  Department of Medicine

## 2020-04-23 ENCOUNTER — PRENATAL OFFICE VISIT - HEALTHEAST (OUTPATIENT)
Dept: FAMILY MEDICINE | Facility: CLINIC | Age: 35
End: 2020-04-23

## 2020-04-23 ENCOUNTER — VIRTUAL VISIT (OUTPATIENT)
Dept: ENDOCRINOLOGY | Facility: CLINIC | Age: 35
End: 2020-04-23
Payer: COMMERCIAL

## 2020-04-23 DIAGNOSIS — E05.90 HYPERTHYROIDISM: ICD-10-CM

## 2020-04-23 DIAGNOSIS — O99.810 ABNORMAL MATERNAL GLUCOSE TOLERANCE, ANTEPARTUM: ICD-10-CM

## 2020-04-23 DIAGNOSIS — N89.8 VAGINAL DISCHARGE: ICD-10-CM

## 2020-04-23 DIAGNOSIS — Z3A.34 34 WEEKS GESTATION OF PREGNANCY: ICD-10-CM

## 2020-04-23 LAB
CLUE CELLS: NORMAL
TRICHOMONAS, WET PREP: NORMAL
YEAST, WET PREP: NORMAL

## 2020-04-23 RX ORDER — HUMAN INSULIN 100 [IU]/ML
INJECTION, SUSPENSION SUBCUTANEOUS
Qty: 30 ML | Refills: 3 | Status: SHIPPED | OUTPATIENT
Start: 2020-04-23 | End: 2020-05-08

## 2020-04-23 NOTE — PATIENT INSTRUCTIONS
Increase NPH to 32 units before breakfast and 30 units before dinner  Phone visit in 2 weeks  Please help her set mychart message    If you have any questions, please do not hesitate to call clinic line at [phone] and ask for Endocrinology clinic.  If you need to fax, please fax to clinic fax number at [phone]    After clinic hours or weekends, please contact [phone] and ask for Endocrinologist-on call      Sincerely,    Jaun Gallagher MD  Endocrinology

## 2020-04-27 ENCOUNTER — COMMUNICATION - HEALTHEAST (OUTPATIENT)
Dept: FAMILY MEDICINE | Facility: CLINIC | Age: 35
End: 2020-04-27

## 2020-04-27 ENCOUNTER — TELEPHONE (OUTPATIENT)
Dept: ENDOCRINOLOGY | Facility: CLINIC | Age: 35
End: 2020-04-27

## 2020-04-30 ENCOUNTER — HOSPITAL ENCOUNTER (OUTPATIENT)
Dept: ULTRASOUND IMAGING | Facility: CLINIC | Age: 35
Discharge: HOME | End: 2020-04-30
Attending: FAMILY MEDICINE

## 2020-05-01 ENCOUNTER — PRENATAL OFFICE VISIT - HEALTHEAST (OUTPATIENT)
Dept: FAMILY MEDICINE | Facility: CLINIC | Age: 35
End: 2020-05-01

## 2020-05-01 DIAGNOSIS — O09.892 SUPERVISION OF OTHER HIGH RISK PREGNANCIES, SECOND TRIMESTER: ICD-10-CM

## 2020-05-01 LAB
CLUE CELLS: NORMAL
TRICHOMONAS, WET PREP: NORMAL
YEAST, WET PREP: NORMAL

## 2020-05-02 LAB
ALLERGIC TO PENICILLIN: NO
GP B STREP DNA SPEC QL NAA+PROBE: NEGATIVE

## 2020-05-04 ENCOUNTER — COMMUNICATION - HEALTHEAST (OUTPATIENT)
Dept: FAMILY MEDICINE | Facility: CLINIC | Age: 35
End: 2020-05-04

## 2020-05-04 DIAGNOSIS — O24.419 GESTATIONAL DIABETES MELLITUS (GDM) IN SECOND TRIMESTER, GESTATIONAL DIABETES METHOD OF CONTROL UNSPECIFIED: ICD-10-CM

## 2020-05-06 NOTE — PROGRESS NOTES
"Neha Mcginnis is a 35 year old female who is being evaluated via a billable telephone visit.      The patient has been notified of following:     \"This telephone visit will be conducted via a call between you and your physician/provider. We have found that certain health care needs can be provided without the need for a physical exam.  This service lets us provide the care you need with a short phone conversation.  If a prescription is necessary we can send it directly to your pharmacy.  If lab work is needed we can place an order for that and you can then stop by our lab to have the test done at a later time.    Telephone visits are billed at different rates depending on your insurance coverage. During this emergency period, for some insurers they may be billed the same as an in-person visit.  Please reach out to your insurance provider with any questions.    If during the course of the call the physician/provider feels a telephone visit is not appropriate, you will not be charged for this service.\"    Patient has given verbal consent for Telephone visit?  Yes    How would you like to obtain your AVS? Mail a copy       I have reviewed and updated the patient's Past Medical History, Social History, Family History and Medication List.    ALLERGIES  Patient has no known allergies.    Additional provider notes:   Neha is a 35 year old female for follow up gestational diabetes    She is currently pregnant with her fourth child. She is 36 weeks pregnancy.  She is due on June 4, 2020.     1) Gestational diabetes: She failed 50 g of glucose tolerance test on 3/12/2020.  Blood glucose was 160 about 1 hour after 50 g glucose. She did have GDM in previous pregnancies: 2014, 2015 and 2016 required insulin in all of them.  She denies having diabetes outside of pregnancy.  She denied family history of diabetes.      Interim history  She said that her OB might induces her 1 week prior to the due date because of a little large " baby.    She is now taking NPH 35 units before breakfast and NPH 35 units before supper. She check BG about 1 hour after meal. Reviewed glucose log as follows;     4/24: fasting 66, after breakfast 175, after lunch 89, after dinner 81  4/25: fasting 91, breakfast 179, lunch 146, dinner 146  4/26: fast 84, breakfast 127, lunch 132, dinner forgot  4/27: fasting 85, breakfast 127, lunch 166, dinner 157  4/28: fasting 87, breakfast 148, lunch 133, dinner 140  4/29: fasting 82, breakfast 111, lunch 153, dinner 186  4/30: fasting 72, breakfast 147, lunch 123, dinner 139  5/1: fasting 67, breakfast 117, lunch forgot, dinner 103  5/2: fasting 69, breakfast 187, lunch 129, snack and checked 1 hr after 167, dinner 128  5/3: fasting 82, breakfast 187, lunch 122, dinner forgot  5/4: fasting 70, breakfast 133, lunch forgot, dinner 134  5/5/: fasting 91, breakfast 124, lunch forgot, dinner 105  5/6: fasting 89, breakfast 160, lunch forgot, dinner 170  5/7/: fasting 94, breakfast 179, lunch 161, dinner 187  5/8: fasting 90    2) abnormal thyroid function test: She stated she had abnormal thyroid function test few years ago.  I have reviewed her chart in care everywhere.  She saw endocrinologist in 2015 because of suppressed TSH.  Her abnormal thyroid function test was thought to be due to pregnancy.    Labs are as follows  7/28/2015: TSH 0.09, free T4 0.7  8/6/2015: TSH 0.09, free T4 0.7  9/28/2015: TSH 0.53, free T4 0.7, free T3 2.5  12/16/2015: TSH 0.34, free T4 0.8, free T3 2.1  2/9/2016: TSH 0.53  9/6/2016: TSH 0.6  11/8/2016: TSH 0.1, free T4 0.8  12/1/2016: TSH 0.14, free T4 0.7  1/30/2017: TSH 0.17, free T4 0.7  5/9/2017: TSH 0.55  9/28/2017: TSH 0.63, free T4 0.9  8/20/2018: TSH 0.62, free T4 0.8, total T4 6.0, total T3 is 44  8/5/2019: TSH 0.6, free T4 0.9  9/30/2019: TSH 0.62  2/21/2020: TSH 0.17, free T4 0.6, total T4 5.7, total T3 101     Lab at the last visit (3/26/2020) showed TSH 0.25, FT4 0.77, total T3 8.0  (4.5-13.9), TT3 122, TPO <10, TSI <1.0.     She is feeling fine with some fatigue.     10 points ROS were negative otherwise documented in HPI above     Physical exam deferred due to telephone visit     Lab and ultrasound reviewed     ASSESSMENT:    Neha is a 35 year old female who has telephone visit today for gestational diabetes      1) Gestational diabetes: She failed 50 g of glucose tolerance test on 3/12/2020. She had hx of GDM with all previous pregnancies. Reviewed glucose log, FBG at goal but 2 hours glucoses post meal were not at goal.   - due to above target BG and LGA, I will further NPH 40 units with breakfast and continue 40 units before dinner.   - she will continue with light exercise and healthy diet  - She will continue to check fasting glucose and 1-2 hours after meal.      2) Abnormal thyroid function test: she did have abnormal thyroid function since 2015 consistent with subclinical hyperthyroidism. Most of TFTs were checked during pregnancy. This was thought to be related to pregnancy.   - lab at the last visit (3/26/2020) was consistent with subclinical hyperthyroidism likely related to pregnancy  - plan to repeat TFT 6-8 weeks after delivery     PLAN:   - increase NPH 40 units with breakfast and 40 units before dinner  - continue to check FBG, and 1-2 hours after meal  - plan to repeat TFT 6-8 weeks after delivery  - set up Kwanji message to update blood glucose weekly until delivery  - return to clinic about 6-8 weeks after delivery     Time start: 11:15 am  Time end: 11:20 am  Phone call duration: 5 minutes     Jaun Gallagher MD  Division of Diabetes and Endocrinology  Department of Medicine

## 2020-05-08 ENCOUNTER — VIRTUAL VISIT (OUTPATIENT)
Dept: ENDOCRINOLOGY | Facility: CLINIC | Age: 35
End: 2020-05-08
Payer: COMMERCIAL

## 2020-05-08 DIAGNOSIS — O99.810 ABNORMAL MATERNAL GLUCOSE TOLERANCE, ANTEPARTUM: ICD-10-CM

## 2020-05-08 RX ORDER — HUMAN INSULIN 100 [IU]/ML
INJECTION, SUSPENSION SUBCUTANEOUS
Qty: 50 ML | Refills: 3 | Status: SHIPPED | OUTPATIENT
Start: 2020-05-08 | End: 2020-10-23

## 2020-05-08 NOTE — PATIENT INSTRUCTIONS
She will send blood glucose via [employer] weekly   Return to clinic in 6-8 weeks after delivery    If you have any questions, please do not hesitate to call clinic line at [phone] and ask for Endocrinology clinic.  If you need to fax, please fax to clinic fax number at [phone]    After clinic hours or weekends, please contact [phone] and ask for Endocrinologist-on call      Sincerely,    Jaun Gallagher MD  Endocrinology

## 2020-05-14 ENCOUNTER — TELEPHONE (OUTPATIENT)
Dept: ENDOCRINOLOGY | Facility: CLINIC | Age: 35
End: 2020-05-14

## 2020-05-14 ENCOUNTER — PRENATAL OFFICE VISIT - HEALTHEAST (OUTPATIENT)
Dept: FAMILY MEDICINE | Facility: CLINIC | Age: 35
End: 2020-05-14

## 2020-05-14 ENCOUNTER — RECORDS - HEALTHEAST (OUTPATIENT)
Dept: ADMINISTRATIVE | Facility: OTHER | Age: 35
End: 2020-05-14

## 2020-05-14 DIAGNOSIS — R12 HEARTBURN: ICD-10-CM

## 2020-05-14 DIAGNOSIS — E03.8 SUBCLINICAL HYPOTHYROIDISM: ICD-10-CM

## 2020-05-14 DIAGNOSIS — O09.93 SUPERVISION OF HIGH RISK PREGNANCY IN THIRD TRIMESTER: ICD-10-CM

## 2020-05-14 DIAGNOSIS — O09.523 MULTIGRAVIDA OF ADVANCED MATERNAL AGE IN THIRD TRIMESTER: ICD-10-CM

## 2020-05-14 NOTE — TELEPHONE ENCOUNTER
CLINIC COORDINATOR SCHEDULING NOTES    CALL RESULT: LVM    APPT TYPE: RDI (OK to sched per clinic)    PROVIDER: Jaun    DATE APPT NEEDED: July 2020

## 2020-05-15 DIAGNOSIS — O24.419 GESTATIONAL DIABETES: Primary | ICD-10-CM

## 2020-05-15 RX ORDER — SYRINGE-NEEDLE,INSULIN,0.5 ML 27GX1/2"
SYRINGE, EMPTY DISPOSABLE MISCELLANEOUS
Qty: 200 EACH | Refills: 1 | Status: SHIPPED | OUTPATIENT
Start: 2020-05-15 | End: 2020-10-23

## 2020-05-15 NOTE — TELEPHONE ENCOUNTER
Jaun Christopher MD  [facility] Triage-    Phone Number: [phone]               Hello,   Can you please send the insulin syringe that is larger 50 units for her? She takes more than 50 units of NPH at a time.  She requests to send to [facility] in [place].

## 2020-05-21 ENCOUNTER — PRENATAL OFFICE VISIT - HEALTHEAST (OUTPATIENT)
Dept: FAMILY MEDICINE | Facility: CLINIC | Age: 35
End: 2020-05-21

## 2020-05-21 DIAGNOSIS — O09.93 SUPERVISION OF HIGH RISK PREGNANCY IN THIRD TRIMESTER: ICD-10-CM

## 2020-05-21 DIAGNOSIS — E03.8 SUBCLINICAL HYPOTHYROIDISM: ICD-10-CM

## 2020-05-21 DIAGNOSIS — O09.523 MULTIGRAVIDA OF ADVANCED MATERNAL AGE IN THIRD TRIMESTER: ICD-10-CM

## 2020-05-26 ENCOUNTER — ANESTHESIA - HEALTHEAST (OUTPATIENT)
Dept: OBGYN | Facility: CLINIC | Age: 35
End: 2020-05-26

## 2020-05-28 ENCOUNTER — HOME CARE/HOSPICE - HEALTHEAST (OUTPATIENT)
Dept: HOME HEALTH SERVICES | Facility: HOME HEALTH | Age: 35
End: 2020-05-28

## 2020-05-30 ENCOUNTER — HOME CARE/HOSPICE - HEALTHEAST (OUTPATIENT)
Dept: HOME HEALTH SERVICES | Facility: HOME HEALTH | Age: 35
End: 2020-05-30

## 2020-06-03 ENCOUNTER — COMMUNICATION - HEALTHEAST (OUTPATIENT)
Dept: HEALTH INFORMATION MANAGEMENT | Facility: CLINIC | Age: 35
End: 2020-06-03

## 2020-06-05 ENCOUNTER — COMMUNICATION - HEALTHEAST (OUTPATIENT)
Dept: FAMILY MEDICINE | Facility: CLINIC | Age: 35
End: 2020-06-05

## 2020-06-05 DIAGNOSIS — O24.419 GESTATIONAL DIABETES MELLITUS (GDM) IN SECOND TRIMESTER, GESTATIONAL DIABETES METHOD OF CONTROL UNSPECIFIED: ICD-10-CM

## 2020-06-19 ENCOUNTER — COMMUNICATION - HEALTHEAST (OUTPATIENT)
Dept: FAMILY MEDICINE | Facility: CLINIC | Age: 35
End: 2020-06-19

## 2020-06-29 DIAGNOSIS — E05.90 SUBCLINICAL HYPERTHYROIDISM: ICD-10-CM

## 2020-06-29 DIAGNOSIS — O24.419 GESTATIONAL DIABETES MELLITUS (GDM), ANTEPARTUM, GESTATIONAL DIABETES METHOD OF CONTROL UNSPECIFIED: Primary | ICD-10-CM

## 2020-07-02 ENCOUNTER — AMBULATORY - HEALTHEAST (OUTPATIENT)
Dept: LAB | Facility: CLINIC | Age: 35
End: 2020-07-02

## 2020-07-02 DIAGNOSIS — O24.419 GESTATIONAL DIABETES MELLITUS, ANTEPARTUM: ICD-10-CM

## 2020-07-02 DIAGNOSIS — E05.90 PRETIBIAL MYXEDEMA: ICD-10-CM

## 2020-07-02 DIAGNOSIS — E05.90 SUBCLINICAL HYPERTHYROIDISM: ICD-10-CM

## 2020-07-10 ENCOUNTER — OFFICE VISIT - HEALTHEAST (OUTPATIENT)
Dept: FAMILY MEDICINE | Facility: CLINIC | Age: 35
End: 2020-07-10

## 2020-07-10 DIAGNOSIS — D64.9 ANEMIA, UNSPECIFIED TYPE: ICD-10-CM

## 2020-07-10 DIAGNOSIS — R53.83 FATIGUE, UNSPECIFIED TYPE: ICD-10-CM

## 2020-07-10 DIAGNOSIS — R06.83 SNORING: ICD-10-CM

## 2020-07-10 LAB
ERYTHROCYTE [DISTWIDTH] IN BLOOD BY AUTOMATED COUNT: 11.9 % (ref 11–14.5)
FERRITIN SERPL-MCNC: 90 NG/ML (ref 10–130)
HCT VFR BLD AUTO: 44.4 % (ref 35–47)
HGB BLD-MCNC: 14.8 G/DL (ref 12–16)
IRON SATN MFR SERPL: 42 % (ref 20–50)
IRON SERPL-MCNC: 110 UG/DL (ref 42–175)
MCH RBC QN AUTO: 30.4 PG (ref 27–34)
MCHC RBC AUTO-ENTMCNC: 33.3 G/DL (ref 32–36)
MCV RBC AUTO: 91 FL (ref 80–100)
PLATELET # BLD AUTO: 250 THOU/UL (ref 140–440)
PMV BLD AUTO: 10.3 FL (ref 8.5–12.5)
RBC # BLD AUTO: 4.87 MILL/UL (ref 3.8–5.4)
TIBC SERPL-MCNC: 262 UG/DL (ref 313–563)
TRANSFERRIN SERPL-MCNC: 209 MG/DL (ref 212–360)
WBC: 6.4 THOU/UL (ref 4–11)

## 2020-07-13 ENCOUNTER — OFFICE VISIT - HEALTHEAST (OUTPATIENT)
Dept: FAMILY MEDICINE | Facility: CLINIC | Age: 35
End: 2020-07-13

## 2020-07-13 DIAGNOSIS — O24.419 GESTATIONAL DIABETES MELLITUS (GDM), ANTEPARTUM, GESTATIONAL DIABETES METHOD OF CONTROL UNSPECIFIED: ICD-10-CM

## 2020-07-13 DIAGNOSIS — R53.83 FATIGUE, UNSPECIFIED TYPE: ICD-10-CM

## 2020-07-13 DIAGNOSIS — N89.8 VAGINAL DISCHARGE: ICD-10-CM

## 2020-07-13 DIAGNOSIS — R35.0 URINARY FREQUENCY: ICD-10-CM

## 2020-07-13 DIAGNOSIS — E05.90 HYPERTHYROIDISM: ICD-10-CM

## 2020-07-13 DIAGNOSIS — N39.3 FEMALE STRESS INCONTINENCE: ICD-10-CM

## 2020-07-13 LAB
CLUE CELLS: NORMAL
HBA1C MFR BLD: 5.3 % (ref 3.5–6)
TRICHOMONAS, WET PREP: NORMAL
TSH SERPL DL<=0.005 MIU/L-ACNC: 0.57 UIU/ML (ref 0.3–5)
VIT B12 SERPL-MCNC: 856 PG/ML (ref 213–816)
YEAST, WET PREP: NORMAL

## 2020-07-13 ASSESSMENT — EDINBURGH POSTNATAL DEPRESSION SCALE (EPDS): TOTAL SCORE: 1

## 2020-07-13 ASSESSMENT — MIFFLIN-ST. JEOR: SCORE: 1362.18

## 2020-07-14 ENCOUNTER — COMMUNICATION - HEALTHEAST (OUTPATIENT)
Dept: FAMILY MEDICINE | Facility: CLINIC | Age: 35
End: 2020-07-14

## 2020-07-14 LAB
HPV SOURCE: ABNORMAL
HUMAN PAPILLOMA VIRUS 16 DNA: NEGATIVE
HUMAN PAPILLOMA VIRUS 18 DNA: POSITIVE
HUMAN PAPILLOMA VIRUS FINAL DIAGNOSIS: ABNORMAL
HUMAN PAPILLOMA VIRUS OTHER HR: NEGATIVE
SPECIMEN DESCRIPTION: ABNORMAL

## 2020-07-15 ENCOUNTER — COMMUNICATION - HEALTHEAST (OUTPATIENT)
Dept: FAMILY MEDICINE | Facility: CLINIC | Age: 35
End: 2020-07-15

## 2020-07-15 DIAGNOSIS — G47.39 SLEEP APNEA-LIKE BEHAVIOR: ICD-10-CM

## 2020-07-16 LAB
C TRACH DNA SPEC QL PROBE+SIG AMP: NEGATIVE
N GONORRHOEA DNA SPEC QL NAA+PROBE: NEGATIVE

## 2020-07-21 ENCOUNTER — COMMUNICATION - HEALTHEAST (OUTPATIENT)
Dept: FAMILY MEDICINE | Facility: CLINIC | Age: 35
End: 2020-07-21

## 2020-07-22 LAB
BKR LAB AP ABNORMAL BLEEDING: NO
BKR LAB AP BIRTH CONTROL/HORMONES: NORMAL
BKR LAB AP CERVICAL APPEARANCE: NORMAL
BKR LAB AP GYN ADEQUACY: NORMAL
BKR LAB AP GYN INTERPRETATION: NORMAL
BKR LAB AP HPV REFLEX: NORMAL
BKR LAB AP LMP: NORMAL
BKR LAB AP PATIENT STATUS: NORMAL
BKR LAB AP PREVIOUS ABNORMAL: NO
BKR LAB AP PREVIOUS NORMAL: 2015
HIGH RISK?: NO
PATH REPORT.COMMENTS IMP SPEC: NORMAL
RESULT FLAG (HE HISTORICAL CONVERSION): NORMAL

## 2020-08-07 ENCOUNTER — COMMUNICATION - HEALTHEAST (OUTPATIENT)
Dept: FAMILY MEDICINE | Facility: CLINIC | Age: 35
End: 2020-08-07

## 2020-08-07 DIAGNOSIS — R12 HEARTBURN: ICD-10-CM

## 2020-08-12 NOTE — PROGRESS NOTES
Attempt to call patient x4. Unable to reach.  Left message for patient to reschedule    Jaun Gallagher MD  Division of Diabetes and Endocrinology  Department of Medicine

## 2020-08-14 ENCOUNTER — VIRTUAL VISIT (OUTPATIENT)
Dept: ENDOCRINOLOGY | Facility: CLINIC | Age: 35
End: 2020-08-14
Payer: COMMERCIAL

## 2020-08-14 DIAGNOSIS — Z53.9 NO SHOW: Primary | ICD-10-CM

## 2020-08-14 NOTE — LETTER
8/14/2020       RE: Neha Mcginnis  [address] [address]     Dear Colleague,    Thank you for referring your patient, Neha Mcginnis, to the [facility] ENDOCRINOLOGY at [facility]. Please see a copy of my visit note below.    Attempt to call patient x4. Unable to reach.  Left message for patient to reschedule    Jaun Gallagher MD  Division of Diabetes and Endocrinology  Department of Medicine        Again, thank you for allowing me to participate in the care of your patient.      Sincerely,    Jaun Gallagher MD

## 2020-10-02 ENCOUNTER — COMMUNICATION - HEALTHEAST (OUTPATIENT)
Dept: FAMILY MEDICINE | Facility: CLINIC | Age: 35
End: 2020-10-02

## 2020-10-02 ENCOUNTER — OFFICE VISIT - HEALTHEAST (OUTPATIENT)
Dept: FAMILY MEDICINE | Facility: CLINIC | Age: 35
End: 2020-10-02

## 2020-10-02 DIAGNOSIS — E05.90 HYPERTHYROIDISM: ICD-10-CM

## 2020-10-02 DIAGNOSIS — R20.2 PARESTHESIAS: ICD-10-CM

## 2020-10-02 DIAGNOSIS — R51.9 NONINTRACTABLE HEADACHE, UNSPECIFIED CHRONICITY PATTERN, UNSPECIFIED HEADACHE TYPE: ICD-10-CM

## 2020-10-02 DIAGNOSIS — O24.419 GESTATIONAL DIABETES MELLITUS (GDM), ANTEPARTUM, GESTATIONAL DIABETES METHOD OF CONTROL UNSPECIFIED: ICD-10-CM

## 2020-10-02 DIAGNOSIS — G47.39 SLEEP APNEA-LIKE BEHAVIOR: ICD-10-CM

## 2020-10-02 LAB
ALBUMIN SERPL-MCNC: 4.2 G/DL (ref 3.5–5)
ALP SERPL-CCNC: 64 U/L (ref 45–120)
ALT SERPL W P-5'-P-CCNC: 17 U/L (ref 0–45)
ANION GAP SERPL CALCULATED.3IONS-SCNC: 9 MMOL/L (ref 5–18)
AST SERPL W P-5'-P-CCNC: 15 U/L (ref 0–40)
BILIRUB SERPL-MCNC: 1 MG/DL (ref 0–1)
BUN SERPL-MCNC: 10 MG/DL (ref 8–22)
CALCIUM SERPL-MCNC: 9.2 MG/DL (ref 8.5–10.5)
CHLORIDE BLD-SCNC: 105 MMOL/L (ref 98–107)
CO2 SERPL-SCNC: 28 MMOL/L (ref 22–31)
CREAT SERPL-MCNC: 0.68 MG/DL (ref 0.6–1.1)
GFR SERPL CREATININE-BSD FRML MDRD: >60 ML/MIN/1.73M2
GLUCOSE BLD-MCNC: 90 MG/DL (ref 70–125)
HBA1C MFR BLD: 5.3 %
POTASSIUM BLD-SCNC: 3.9 MMOL/L (ref 3.5–5)
PROT SERPL-MCNC: 7 G/DL (ref 6–8)
SODIUM SERPL-SCNC: 142 MMOL/L (ref 136–145)
TSH SERPL DL<=0.005 MIU/L-ACNC: 0.45 UIU/ML (ref 0.3–5)

## 2020-10-05 ENCOUNTER — COMMUNICATION - HEALTHEAST (OUTPATIENT)
Dept: FAMILY MEDICINE | Facility: CLINIC | Age: 35
End: 2020-10-05

## 2020-10-20 ENCOUNTER — OFFICE VISIT - HEALTHEAST (OUTPATIENT)
Dept: FAMILY MEDICINE | Facility: CLINIC | Age: 35
End: 2020-10-20

## 2020-10-20 DIAGNOSIS — L63.9 ALOPECIA AREATA: ICD-10-CM

## 2020-10-20 DIAGNOSIS — E04.9 GOITER: ICD-10-CM

## 2020-10-20 DIAGNOSIS — R20.2 PARESTHESIAS: ICD-10-CM

## 2020-10-22 ENCOUNTER — COMMUNICATION - HEALTHEAST (OUTPATIENT)
Dept: FAMILY MEDICINE | Facility: CLINIC | Age: 35
End: 2020-10-22

## 2020-10-22 NOTE — PROGRESS NOTES
"Neha Mcginnis is a 35 year old female who is being evaluated via a billable telephone visit.      The patient has been notified of following:     \"This telephone visit will be conducted via a call between you and your physician/provider. We have found that certain health care needs can be provided without the need for a physical exam.  This service lets us provide the care you need with a short phone conversation.  If a prescription is necessary we can send it directly to your pharmacy.  If lab work is needed we can place an order for that and you can then stop by our lab to have the test done at a later time.    Telephone visits are billed at different rates depending on your insurance coverage. During this emergency period, for some insurers they may be billed the same as an in-person visit.  Please reach out to your insurance provider with any questions.    If during the course of the call the physician/provider feels a telephone visit is not appropriate, you will not be charged for this service.\"    Patient has given verbal consent for Telephone visit?  Yes    What phone number would you like to be contacted at? [phone]    How would you like to obtain your AVS? Mail a copy        I have reviewed and updated the patient's Past Medical History, Social History, Family History and Medication List.    ALLERGIES  Patient has no known allergies.    Provider notes:     Neha is a 35 year old female for who has telephone visit today for follow up.    She used to follow up with me earlier this year for gestational diabetes when she failed 50 g of glucose tolerance test on 3/12/2020.  Blood glucose was 160 about 1 hour after 50 g glucose. She did have GDM in previous pregnancies: 2014, 2015 and 2016 required insulin in all of them.  She denies having diabetes outside of pregnancy.  She denied family history of diabetes. She was treated with insulin NPH during pregnancy. She delivered the baby in May 2020 uneventfully. "      She also did have abnormal thyroid function test. I have reviewed her chart in care everywhere.  She saw endocrinologist in 2015 because of suppressed TSH.  Her abnormal thyroid function test was thought to be due to pregnancy.    Labs are as follows  7/28/2015: TSH 0.09, free T4 0.7  8/6/2015: TSH 0.09, free T4 0.7  9/28/2015: TSH 0.53, free T4 0.7, free T3 2.5  12/16/2015: TSH 0.34, free T4 0.8, free T3 2.1  2/9/2016: TSH 0.53  9/6/2016: TSH 0.6  11/8/2016: TSH 0.1, free T4 0.8  12/1/2016: TSH 0.14, free T4 0.7  1/30/2017: TSH 0.17, free T4 0.7  5/9/2017: TSH 0.55  9/28/2017: TSH 0.63, free T4 0.9  8/20/2018: TSH 0.62, free T4 0.8, total T4 6.0, total T3 is 44  8/5/2019: TSH 0.6, free T4 0.9  9/30/2019: TSH 0.62  2/21/2020: TSH 0.17, free T4 0.6, total T4 5.7, total T3 101  3/26/2020 showed TSH 0.25, FT4 0.77, total T3 8.0 (4.5-13.9), TT3 122, TPO <10, TSI <1.0.     Interim history:  She said that she is not doing well. She has had tingling, sharp shooting pain, pin and needle feeling all over her body, face, arms and legs. She said it started about 6 years ago but off and on. She did not seek medical attention at that time. She said the symptoms got worse after delivery. She also complaints of blurry vision, black spot. She notices mood swing easily. She is wondering whether it is from unhealthy food choice or mercury poisoning.     She saw her PCP earlier this months. Was started on gabapentin. Labs were normal. TSH 0.45, A1c 5.3, liver function, kidney function are all normal.     She stopped breastfeeding at 2 months postpartum  She is taking multivitamin, zinc. Denied taking herbal supplement     PM  Past Medical History:   Diagnosis Date     Gestational diabetes      NO ACTIVE PROBLEMS      PSH  Past Surgical History:   Procedure Laterality Date     APPENDECTOMY      5 years old     BIOPSY OF SKIN LESION      mole removal-6 years old     ROS  10 points ROS were negative otherwise documented in HPI  "above     Medication:  Current Outpatient Medications   Medication Sig Dispense Refill     acetone, Urine, test (KETOSTIX) STRP 1 strip by In Vitro route once a week Test urine weekly 30 strip 1     blood glucose (ACCU-CHEK GUIDE) test strip Use to test blood sugar 4 times daily or as directed. 300 each 3     blood glucose monitoring (ACCU-CHEK FASTCLIX) lancets Use to test blood sugar 4 times daily or as directed.  Ok to substitute alternative if insurance prefers. 306 each 3     Blood Glucose Monitoring Suppl (BL BLOOD GLUCOSE MONITOR KIT) W/DEVICE KIT 1 kit 4 times daily Test blood sugar four times daily 1 kit 0     COMFORT LANCETS MISC 1 each 4 times daily Test blood sugars four times daily 120 each 3     cyanocobalamin (VITAMIN B-12) 25 mcg TABS quarter-tab Take 1,000 mcg by mouth daily       diphenhydrAMINE (BENADRYL) 25 MG capsule Take 25 mg by mouth every 6 hours as needed for itching or allergies       docusate sodium (COLACE) 50 MG capsule Take 50 mg by mouth 2 times daily       Ferrous Sulfate (IRON) 325 (65 Fe) MG tablet Take 1 tablet by mouth daily       fluticasone (ARNUITY ELLIPTA) 100 MCG/ACT inhaler Inhale 1 puff into the lungs daily       folic acid 0.8 MG CAPS        glucose blood VI test strips strip Test blood sugar 4 times daily 1 Box 12     insulin NPH (NOVOLIN N VIAL) 100 UNIT/ML vial Titrate insulin up to 50 units with breakfast and 50 units with dinner 50 mL 3     insulin pen needle (ULTICARE MICRO) 32G X 4 MM miscellaneous Use 1 pen needles daily or as directed. 100 each 1     insulin syringe 31G X 5/16\" 0.5 ML MISC Use 2 daily with Novolin  N vials 100 each 1     Insulin Syringe-Needle U-100 27G X 1/2\" 1 ML MISC Use for subcu insulin injection 2x daily 200 each 1     Misc. Devices (BREAST PUMP) MISC 1 each daily as needed 1 Device 0     ondansetron (ZOFRAN ODT) 4 MG disintegrating tablet Take 1 tablet (4 mg) by mouth every 6 hours as needed for nausea (Patient not taking: Reported on " 3/26/2020) 20 tablet 1     polyethylene glycol (MIRALAX) packet Take 1 packet by mouth daily       PRENATAL VITAMINS PO Take  by mouth.       Urine Glucose-Ketones Test (KETO-DIASTIX) STRP 1 strip by In Vitro route daily For one week and then once weekly thereafter (Patient not taking: Reported on 3/26/2020) 50 each 0     vitamin C (ASCORBIC ACID) 1000 MG TABS Take 1,000 mg by mouth daily       zinc gluconate 50 MG tablet Take 50 mg by mouth daily         Physical exam   Deferred due to telephone visit     Lab reviewed      ASSESSMENT:    Neha is a 35 year old female with hx of gestational diabetes  who has telephone visit today      1) symptoms of paresthesia all over her body: unclear etiology. Seems to have it around 6 years ago but intermittently. Now the symptoms got worse after delivery. Labs were normal. TSH and A1c were normal.    2) Hx of gestational diabetes: She had hx of GDM with all previous pregnancies. Was on insulin during pregnancy. Now 5 months postpartum. A1c 5.3 on 10/2/2020  - encourage for exercise and healthy diet    3) hx of abnormal thyroid function test: she did have abnormal thyroid function since 2015 consistent with subclinical hyperthyroidism. Most of TFTs were checked during pregnancy. This was thought to be related to pregnancy.   - lab after postpartum was normal. TSH 0.45 on 10/2/2020.       PLAN:   - follow up PRN.  - encourage her to follow up with PCP about paresthesia     Time start: 12:30 PM  Time end: 1241 PM  Phone call duration: 11 minutes     Jaun Gallagher MD  Division of Diabetes and Endocrinology  Department of Medicine

## 2020-10-23 ENCOUNTER — VIRTUAL VISIT (OUTPATIENT)
Dept: ENDOCRINOLOGY | Facility: CLINIC | Age: 35
End: 2020-10-23
Payer: COMMERCIAL

## 2020-10-23 ENCOUNTER — AMBULATORY - HEALTHEAST (OUTPATIENT)
Dept: LAB | Facility: CLINIC | Age: 35
End: 2020-10-23

## 2020-10-23 ENCOUNTER — RECORDS - HEALTHEAST (OUTPATIENT)
Dept: ADMINISTRATIVE | Facility: OTHER | Age: 35
End: 2020-10-23

## 2020-10-23 DIAGNOSIS — R20.2 PARESTHESIAS: ICD-10-CM

## 2020-10-23 DIAGNOSIS — R20.2 PARESTHESIA: Primary | ICD-10-CM

## 2020-10-23 LAB — CORTIS SERPL-MCNC: 5.6 UG/DL

## 2020-10-23 PROCEDURE — 99213 OFFICE O/P EST LOW 20 MIN: CPT | Mod: TEL | Performed by: INTERNAL MEDICINE

## 2020-10-23 NOTE — LETTER
"10/23/2020       RE: Neha Mcginnis  [address] [facility] [address]     Dear Colleague,    Thank you for referring your patient, Neha Mcginnis, to the [facility] END[facility] [place] [facility]. Please see a copy of my visit note below.    Neha Mcginnis is a 35 year old female who is being evaluated via a billable telephone visit.      The patient has been notified of following:     \"This telephone visit will be conducted via a call between you and your physician/provider. We have found that certain health care needs can be provided without the need for a physical exam.  This service lets us provide the care you need with a short phone conversation.  If a prescription is necessary we can send it directly to your pharmacy.  If lab work is needed we can place an order for that and you can then stop by our lab to have the test done at a later time.    Telephone visits are billed at different rates depending on your insurance coverage. During this emergency period, for some insurers they may be billed the same as an in-person visit.  Please reach out to your insurance provider with any questions.    If during the course of the call the physician/provider feels a telephone visit is not appropriate, you will not be charged for this service.\"    Patient has given verbal consent for Telephone visit?  Yes    What phone number would you like to be contacted at? [phone]    How would you like to obtain your AVS? Mail a copy        I have reviewed and updated the patient's Past Medical History, Social History, Family History and Medication List.    ALLERGIES  Patient has no known allergies.    Provider notes:     Neha is a 35 year old female for who has telephone visit today for follow up.    She used to follow up with me earlier this year for gestational diabetes when she failed 50 g of glucose tolerance test on 3/12/2020.  Blood glucose was 160 about 1 hour after 50 g " glucose. She did have GDM in previous pregnancies: 2014, 2015 and 2016 required insulin in all of them.  She denies having diabetes outside of pregnancy.  She denied family history of diabetes. She was treated with insulin NPH during pregnancy. She delivered the baby in May 2020 uneventfully.      She also did have abnormal thyroid function test. I have reviewed her chart in care everywhere.  She saw endocrinologist in 2015 because of suppressed TSH.  Her abnormal thyroid function test was thought to be due to pregnancy.    Labs are as follows  7/28/2015: TSH 0.09, free T4 0.7  8/6/2015: TSH 0.09, free T4 0.7  9/28/2015: TSH 0.53, free T4 0.7, free T3 2.5  12/16/2015: TSH 0.34, free T4 0.8, free T3 2.1  2/9/2016: TSH 0.53  9/6/2016: TSH 0.6  11/8/2016: TSH 0.1, free T4 0.8  12/1/2016: TSH 0.14, free T4 0.7  1/30/2017: TSH 0.17, free T4 0.7  5/9/2017: TSH 0.55  9/28/2017: TSH 0.63, free T4 0.9  8/20/2018: TSH 0.62, free T4 0.8, total T4 6.0, total T3 is 44  8/5/2019: TSH 0.6, free T4 0.9  9/30/2019: TSH 0.62  2/21/2020: TSH 0.17, free T4 0.6, total T4 5.7, total T3 101  3/26/2020 showed TSH 0.25, FT4 0.77, total T3 8.0 (4.5-13.9), TT3 122, TPO <10, TSI <1.0.     Interim history:  She said that she is not doing well. She has had tingling, sharp shooting pain, pin and needle feeling all over her body, face, arms and legs. She said it started about 6 years ago but off and on. She did not seek medical attention at that time. She said the symptoms got worse after delivery. She also complaints of blurry vision, black spot. She notices mood swing easily. She is wondering whether it is from unhealthy food choice or mercury poisoning.     She saw her PCP earlier this months. Was started on gabapentin. Labs were normal. TSH 0.45, A1c 5.3, liver function, kidney function are all normal.     She stopped breastfeeding at 2 months postpartum  She is taking multivitamin, zinc. Denied taking herbal supplement     PMH  Past Medical  "History:   Diagnosis Date     Gestational diabetes      NO ACTIVE PROBLEMS      PSH  Past Surgical History:   Procedure Laterality Date     APPENDECTOMY      5 years old     BIOPSY OF SKIN LESION      mole removal-6 years old     ROS  10 points ROS were negative otherwise documented in HPI above     Medication:  Current Outpatient Medications   Medication Sig Dispense Refill     acetone, Urine, test (KETOSTIX) STRP 1 strip by In Vitro route once a week Test urine weekly 30 strip 1     blood glucose (ACCU-CHEK GUIDE) test strip Use to test blood sugar 4 times daily or as directed. 300 each 3     blood glucose monitoring (ACCU-CHEK FASTCLIX) lancets Use to test blood sugar 4 times daily or as directed.  Ok to substitute alternative if insurance prefers. 306 each 3     Blood Glucose Monitoring Suppl (BL BLOOD GLUCOSE MONITOR KIT) W/DEVICE KIT 1 kit 4 times daily Test blood sugar four times daily 1 kit 0     COMFORT LANCETS MISC 1 each 4 times daily Test blood sugars four times daily 120 each 3     cyanocobalamin (VITAMIN B-12) 25 mcg TABS quarter-tab Take 1,000 mcg by mouth daily       diphenhydrAMINE (BENADRYL) 25 MG capsule Take 25 mg by mouth every 6 hours as needed for itching or allergies       docusate sodium (COLACE) 50 MG capsule Take 50 mg by mouth 2 times daily       Ferrous Sulfate (IRON) 325 (65 Fe) MG tablet Take 1 tablet by mouth daily       fluticasone (ARNUITY ELLIPTA) 100 MCG/ACT inhaler Inhale 1 puff into the lungs daily       folic acid 0.8 MG CAPS        glucose blood VI test strips strip Test blood sugar 4 times daily 1 Box 12     insulin NPH (NOVOLIN N VIAL) 100 UNIT/ML vial Titrate insulin up to 50 units with breakfast and 50 units with dinner 50 mL 3     insulin pen needle (ULTICARE MICRO) 32G X 4 MM miscellaneous Use 1 pen needles daily or as directed. 100 each 1     insulin syringe 31G X 5/16\" 0.5 ML MISC Use 2 daily with Novolin  N vials 100 each 1     Insulin Syringe-Needle U-100 27G X 1/2\" " 1 ML MISC Use for subcu insulin injection 2x daily 200 each 1     Misc. Devices (BREAST PUMP) MISC 1 each daily as needed 1 Device 0     ondansetron (ZOFRAN ODT) 4 MG disintegrating tablet Take 1 tablet (4 mg) by mouth every 6 hours as needed for nausea (Patient not taking: Reported on 3/26/2020) 20 tablet 1     polyethylene glycol (MIRALAX) packet Take 1 packet by mouth daily       PRENATAL VITAMINS PO Take  by mouth.       Urine Glucose-Ketones Test (KETO-DIASTIX) STRP 1 strip by In Vitro route daily For one week and then once weekly thereafter (Patient not taking: Reported on 3/26/2020) 50 each 0     vitamin C (ASCORBIC ACID) 1000 MG TABS Take 1,000 mg by mouth daily       zinc gluconate 50 MG tablet Take 50 mg by mouth daily         Physical exam   Deferred due to telephone visit     Lab reviewed      ASSESSMENT:    Neha is a 35 year old female with hx of gestational diabetes  who has telephone visit today      1) symptoms of paresthesia all over her body: unclear etiology. Seems to have it around 6 years ago but intermittently. Now the symptoms got worse after delivery. Labs were normal. TSH and A1c were normal.    2) Hx of gestational diabetes: She had hx of GDM with all previous pregnancies. Was on insulin during pregnancy. Now 5 months postpartum. A1c 5.3 on 10/2/2020  - encourage for exercise and healthy diet    3) hx of abnormal thyroid function test: she did have abnormal thyroid function since 2015 consistent with subclinical hyperthyroidism. Most of TFTs were checked during pregnancy. This was thought to be related to pregnancy.   - lab after postpartum was normal. TSH 0.45 on 10/2/2020.       PLAN:   - follow up PRN.  - encourage her to follow up with PCP about paresthesia     Time start: 12:30 PM  Time end: 1241 PM  Phone call duration: 11 minutes     Jaun Gallagher MD  Division of Diabetes and Endocrinology  Department of Medicine

## 2020-10-23 NOTE — PATIENT INSTRUCTIONS
Follow up with primary doctor    If you have any questions, please do not hesitate to call clinic line at [phone] and ask for Endocrinology clinic.  If you need to fax, please fax to clinic fax number at [phone]    After clinic hours or weekends, please contact [phone] and ask for Endocrinologist-on call      Sincerely,    Jaun Gallagher MD  Endocrinology

## 2020-10-26 LAB
25(OH)D3 SERPL-MCNC: 57.4 NG/ML (ref 30–80)
25(OH)D3 SERPL-MCNC: 57.4 NG/ML (ref 30–80)

## 2020-10-27 LAB
ANA SER QL: 0.4 U
ARSENIC, WHOLE BLOOD: <10 NG/ML
LAB SAMPLE TYPE: NORMAL
LAB STATE REPORTED TO: NORMAL
LEAD, WHOLE BLOOD - HISTORICAL: <1 UG/DL
MERCURY, WHOLE BLOOD - HISTORICAL: <5 NG/ML
SPECIMEN STATUS: NORMAL

## 2020-11-20 ENCOUNTER — AMBULATORY - HEALTHEAST (OUTPATIENT)
Dept: PHARMACY | Facility: CLINIC | Age: 35
End: 2020-11-20

## 2020-11-20 DIAGNOSIS — Z34.90 PREGNANCY, UNSPECIFIED GESTATIONAL AGE: ICD-10-CM

## 2020-12-14 VITALS — BODY MASS INDEX: 29.64 KG/M2 | HEIGHT: 61 IN | WEIGHT: 157 LBS

## 2020-12-14 ASSESSMENT — MIFFLIN-ST. JEOR: SCORE: 1344.53

## 2020-12-14 NOTE — PROGRESS NOTES
"Neha Mcginnis is a 35 year old female who is being evaluated via a billable telephone visit.      The patient has been notified of following:     \"This telephone visit will be conducted via a call between you and your physician/provider. We have found that certain health care needs can be provided without the need for a physical exam.  This service lets us provide the care you need with a short phone conversation.  If a prescription is necessary we can send it directly to your pharmacy.  If lab work is needed we can place an order for that and you can then stop by our lab to have the test done at a later time.    Telephone visits are billed at different rates depending on your insurance coverage. During this emergency period, for some insurers they may be billed the same as an in-person visit.  Please reach out to your insurance provider with any questions.    If during the course of the call the physician/provider feels a telephone visit is not appropriate, you will not be charged for this service.\"    Patient has given verbal consent for Telephone visit?  Yes    What phone number would you like to be contacted at? [phone]    How would you like to obtain your AVS? PhotoSyn[employer]art    Phone call duration: 17 minutes    Devan Addison MD, MD      Sleep Consultation:    Date on this visit: 12/15/2020    Neha Mcginnis is sent by No ref. provider found for a sleep consultation regarding possible sleep apnea.    Primary Physician: No Ref-Primary, Physician     Chief complaint: snoring, poor sleep, daytime fatigue     Presenting History:     Neha Mcginnis reports nightly snoring, poor quality of sleep and excessive daytime fatigue for last 3 years.     Her medical history is significant for history of gestational diabetes.     Neha does snore every night. Patient does have a regular bed partner. There is report of snoring, gasping and poor quality of sleep.  She does not have witnessed apneas. They never sleep separately.  Patient sleeps " on her side and her stomach. She denies no morning headaches and restless legs. Neha denies any bruxism, sleep walking, sleep talking, dream enactment, sleep paralysis, cataplexy and hypnogogic/hypnopompic hallucinations.    Neha goes to sleep at 9:30 PM during the week. She wakes up at 4:30 AM with an alarm. She falls asleep in 15 minutes.  Neha denies difficulty falling asleep.  She wakes up 1-2 times a night for 10 minutes before falling back to sleep.  Neha wakes up to go to the bathroom and uncertain reasons.  On weekends, Neha goes to sleep at 10:30 PM.  She wakes up at 7:30 AM without an alarm. She falls asleep in 15 minutes.  Patient gets an average of 7 hours of sleep per night.     Neha denies difficulty breathing through her nose.      Patient complains of non restorative sleep and excessive daytime fatigue. Her [place] Sleepiness score 6/24 consistent with no daytime sleepiness.      Neha naps 0-1 times per week for 30-60 minutes, feels refreshed after naps. She takes no inadvertant naps.  She denies closing eyes, dozing and falling asleep while driving. Patient was counseled on the importance of driving while alert, to pull over if drowsy, or nap before getting into the vehicle if sleepy.      She has stopped using caffeine last 2 weeks.     Allergies:    No Known Allergies    Medications:    Current Outpatient Medications   Medication Sig Dispense Refill     cyanocobalamin (VITAMIN B-12) 25 mcg TABS quarter-tab Take 1,000 mcg by mouth daily       vitamin C (ASCORBIC ACID) 1000 MG TABS Take 1,000 mg by mouth daily       zinc gluconate 50 MG tablet Take 50 mg by mouth daily       diphenhydrAMINE (BENADRYL) 25 MG capsule Take 25 mg by mouth every 6 hours as needed for itching or allergies       docusate sodium (COLACE) 50 MG capsule Take 50 mg by mouth 2 times daily       Ferrous Sulfate (IRON) 325 (65 Fe) MG tablet Take 1 tablet by mouth daily       fluticasone (ARNUITY ELLIPTA) 100 MCG/ACT inhaler  Inhale 1 puff into the lungs daily       folic acid 0.8 MG CAPS        Misc. Devices (BREAST PUMP) MISC 1 each daily as needed (Patient not taking: Reported on 12/15/2020) 1 Device 0     ondansetron (ZOFRAN ODT) 4 MG disintegrating tablet Take 1 tablet (4 mg) by mouth every 6 hours as needed for nausea (Patient not taking: Reported on 3/26/2020) 20 tablet 1     polyethylene glycol (MIRALAX) packet Take 1 packet by mouth daily       PRENATAL VITAMINS PO Take  by mouth.       Urine Glucose-Ketones Test (KETO-DIASTIX) STRP 1 strip by In Vitro route daily For one week and then once weekly thereafter (Patient not taking: Reported on 3/26/2020) 50 each 0       Problem List:  Patient Active Problem List    Diagnosis Date Noted     Supervision of other high-risk pregnancy 12/22/2013     Priority: Medium     Problem list name updated by automated process. Provider to review and confirm   Problem list name updated by automated process. 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    Future pregnancies need genetic consultation.  Risk of premature ovarian failure in patient.       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  Patient (mom) has same genetic balanced translocation.  See [facility] genetic consultation.  Abnormal fetal echocardiogram (abnormal cardiac axis).  Baby will need a SIVA (transthoracic echo) after 24 hours after birth prior to d/c home.         GBS (group B streptococcus) UTI complicating pregnancy 07/15/2013     Priority: Medium     Treated 7/13       Need for Tdap  vaccination 2013     Priority: Medium     CARDIOVASCULAR SCREENING; LDL GOAL LESS THAN 160 2013     Priority: Medium        Past Medical/Surgical History:  Past Medical History:   Diagnosis Date     Gestational diabetes      NO ACTIVE PROBLEMS      Past Surgical History:   Procedure Laterality Date     APPENDECTOMY      5 years old     BIOPSY OF SKIN LESION      mole removal-6 years old       Social History:    Social History     Tobacco Use     Smoking status: Former Smoker     Types: Cigarettes     Smokeless tobacco: Never Used     Tobacco comment: Quit in    Substance Use Topics     Alcohol use: No     Drug use: No         Family History:  Family History   Problem Relation Age of Onset     Hypertension Mother      Cerebrovascular Disease Mother      Myocardial Infarction Father         ?  in [place]       Review of Systems:  A complete review of systems reviewed by me is negative with the exeption of what has been mentioned in the history of present illness.  CONSTITUTIONAL: NEGATIVE for weight gain/loss, fever, chills, sweats or night sweats, drug allergies.  EYES: NEGATIVE for changes in vision, blind spots, double vision.  ENT: NEGATIVE for ear pain, sore throat, sinus pain, post-nasal drip, runny nose, bloody nose  CARDIAC: NEGATIVE for fast heartbeats or fluttering in chest, chest pain or pressure, breathlessness when lying flat, swollen legs or swollen feet.  NEUROLOGIC: NEGATIVE headaches, weakness or numbness in the arms or legs.  DERMATOLOGIC: NEGATIVE for rashes, new moles or change in mole(s)  PULMONARY: NEGATIVE SOB at rest, SOB with activity, dry cough, productive cough, coughing up blood, wheezing or whistling when breathing.    GASTROINTESTINAL: NEGATIVE for nausea or vomitting, loose or watery stools, fat or grease in stools, constipation, abdominal pain, bowel movements black in color or blood noted.  GENITOURINARY: NEGATIVE for pain during urination, blood in urine,  "urinating more frequently than usual, irregular menstrual periods.  MUSCULOSKELETAL: NEGATIVE for muscle pain, bone or joint pain, swollen joints.  ENDOCRINE: NEGATIVE for increased thirst or urination, diabetes.  LYMPHATIC: NEGATIVE for swollen lymph nodes, lumps or bumps in the breasts or nipple discharge.    Screening:  Vitals: Ht 1.549 m (5' 1\")   Wt 71.2 kg (157 lb)   BMI 29.66 kg/m    BMI= Body mass index is 29.66 kg/m .         [place] Total Score 12/14/2020   Total score - [place] 6       [place] Total Score: 18 (12/14/20 1707)    Impression/Plan:    1. Possible obstructive sleep apnea    Patient is a 35 years old female, BMI 29, who presents with history of snoring, non restorative sleep and daytime fatigue. There is an intermediate risk for sleep apnea and an overnight sleep study is recommended for assessment and treatment planning.     Plan:     1. PSG for assessment of sleep apnea     Literature provided regarding sleep apnea.      She will follow up with me in approximately two weeks after her sleep study has been competed to review the results and discuss plan of care.       Polysomnography reviewed.  Obstructive sleep apnea reviewed.  Complications of untreated sleep apnea were reviewed.    I spent a total of 17 minutes with patient with more than 50% in counseling     Dr. Devan Addison     CC: No ref. provider found        "

## 2020-12-15 ENCOUNTER — VIRTUAL VISIT (OUTPATIENT)
Dept: SLEEP MEDICINE | Facility: CLINIC | Age: 35
End: 2020-12-15
Payer: COMMERCIAL

## 2020-12-15 DIAGNOSIS — R06.83 SNORING: ICD-10-CM

## 2020-12-15 DIAGNOSIS — R29.818 SUSPECTED SLEEP APNEA: Primary | ICD-10-CM

## 2020-12-15 DIAGNOSIS — R40.0 SLEEPINESS: ICD-10-CM

## 2020-12-15 PROCEDURE — 99201 PR OFFICE/OUTPT VISIT, NEW, LEVEL I: CPT | Mod: TEL | Performed by: INTERNAL MEDICINE

## 2020-12-19 NOTE — NURSING NOTE
Sleep study and follow up with provider scheduled.    Kin Sifuentes  Sleep Clinic Specialist - [place]

## 2020-12-20 ENCOUNTER — HEALTH MAINTENANCE LETTER (OUTPATIENT)
Age: 35
End: 2020-12-20

## 2021-01-12 ENCOUNTER — COMMUNICATION - HEALTHEAST (OUTPATIENT)
Dept: FAMILY MEDICINE | Facility: CLINIC | Age: 36
End: 2021-01-12

## 2021-01-12 DIAGNOSIS — R20.2 PARESTHESIAS: ICD-10-CM

## 2021-01-13 ENCOUNTER — THERAPY VISIT (OUTPATIENT)
Dept: SLEEP MEDICINE | Facility: CLINIC | Age: 36
End: 2021-01-13
Payer: COMMERCIAL

## 2021-01-13 DIAGNOSIS — R29.818 SUSPECTED SLEEP APNEA: ICD-10-CM

## 2021-01-13 DIAGNOSIS — R40.0 SLEEPINESS: ICD-10-CM

## 2021-01-13 DIAGNOSIS — R06.83 SNORING: ICD-10-CM

## 2021-01-13 PROCEDURE — 95810 POLYSOM 6/> YRS 4/> PARAM: CPT | Performed by: INTERNAL MEDICINE

## 2021-01-17 NOTE — PROCEDURES
"SLEEP STUDY INTERPRETATION  DIAGNOSTIC POLYSOMNOGRAPHY REPORT      Patient: HAIDER SAGE  YOB: 1985  Study Date: 1/13/2021  MRN: [id number]  Referring Provider: -  Ordering Provider: PABLITO Addison MD    Indications for Polysomnography: The patient is a 35 year old Female who is 5' 1\" and weighs 157.0 lbs. Her BMI is 30.0, [place] sleepiness scale 6 and neck circumference is 34 cm. Relevant medical history includes fatigue. A diagnostic polysomnogram was performed to evaluate for sleep apnea.    Polysomnogram Data: A full night polysomnogram recorded the standard physiologic parameters including EEG, EOG, EMG, ECG, nasal and oral airflow. Respiratory parameters of chest and abdominal movements were recorded with respiratory inductance plethysmography. Oxygen saturation was recorded by pulse oximetry. Hypopnea scoring rule used: 1B 4%.    Sleep Architecture: Normal sleep architecture.   The total recording time of the polysomnogram was 479.6 minutes. The total sleep time was 474.0 minutes. Sleep latency was decreased at 0.2 minutes without the use of a sleep aid. REM latency was 106.0 minutes. Arousal index was normal at 14.7 arousals per hour. Sleep efficiency was normal at 98.8%. Wake after sleep onset was 5.0 minutes. The patient spent 3.5% of total sleep time in Stage N1, 55.8% in Stage N2, 21.3% in Stage N3, and 19.4% in REM. Time in REM supine was 67.5 minutes.    Respiration: Negative for sleep apnea.     Events ? The polysomnogram revealed a presence of - obstructive, 2 central, and 1 mixed apneas resulting in an apnea index of 0.4 events per hour. There were 1 obstructive hypopneas and - central hypopneas resulting in an obstructive hypopnea index of 0.1 and central hypopnea index of - events per hour. The combined apnea/hypopnea index was 0.5 events per hour (central apnea/hypopnea index was 0.3 events per hour). The REM AHI was 0.7 events per hour. The supine AHI was 0.7 events per hour. The RERA " index was 0.3 events per hour.  The RDI was 0.8 events per hour.    Snoring - was reported as moderate.    Respiratory rate and pattern - was notable for normal respiratory rate and pattern.    Sustained Sleep Associated Hypoventilation - Transcutaneous carbon dioxide monitoring was not used, however significant hypoventilation was not suggested by oximetry.    Sleep Associated Hypoxemia - (Greater than 5 minutes O2 sat at or below 88%) was not present. Baseline oxygen saturation was 97.5%. Lowest oxygen saturation was 93.7%. Time spent less than or equal to 88% was 0 minutes. Time spent less than or equal to 89% was 0 minutes.    Movement Activity: Negative for movement abnormalities.     Periodic Limb Activity - There were 5 PLMs during the entire study. The PLM index was 0.6 movements per hour. The PLM Arousal Index was - per hour.    REM EMG Activity - Excessive transient/sustained muscle activity was not present. An occasional epoch with increased EMG tone was noted in REM but a sustained pattern of abnormal REM EMG tone was absent.     Nocturnal Behavior - Abnormal sleep related behaviors were not noted during/arising out of NREM / REM sleep.     Bruxism - None apparent.    Cardiac Summary: Sinus rhythm.   The average pulse rate was 62.2 bpm. The minimum pulse rate was 47.2 bpm while the maximum pulse rate was 107.7 bpm.  Arrhythmias were not noted.    Assessment:     This sleep study is negative for sleep apnea or movement abnormalities that can explain patient s fatigue. This was a good test which captured supine and non-supine sleep including supine REM without evidence of sleep apnea.     Sleep architecture was normal with good sleep efficiency, normal sleep stages and no significant arousals.     Recommendations:    Patient can be reassured regarding absence of sleep apnea or another sleep fragmenting disorder.     Diagnostic Codes:   Unspecified Sleep Disturbance G47.9    1/13/2021 [place]  Sleep Study (157.0 lbs) - AHI 0.5, RDI 0.8, Supine AHI 0.7, REM AHI 0.7, Low O2 93.7%, Time Spent ?88% 0 minutes / Time Spent ?89% 0 minutes.     _____________________________________   Electronically Signed By: Devan Addison MD 01/17/2021

## 2021-01-19 LAB — SLPCOMP: NORMAL

## 2021-02-02 NOTE — PROGRESS NOTES
Neha is a 35 year old who is being evaluated via a billable video visit.      How would you like to obtain your AVS? MyChart  If the video visit is dropped, the invitation should be resent by: Text to cell phone: [phone]  Will anyone else be joining your video visit? Noelle Donovan MA    Video Start Time: 3:05PM  Video-Visit Details    Type of service:  Video Visit    Video End Time:3:12 PM    Originating Location (pt. Location): Home    Distant Location (provider location):  [facility]     Platform used for Video Visit: Workst[employer]    Sleep Study Follow-Up Visit:    Date on this visit: 2/3/2021    Neha Mcginnis comes in today for follow-up of her sleep study done on 1/13/21 at the [facility] for possible sleep apnea.    Sleep latency 0.2 minutes without Ambien.  REM achieved.   REM latency 106 minutes.  Sleep efficiency 98.8%. Total sleep time 474 minutes.    Sleep architecture:  Stage 1, 3.5% (5%), stage 2, 55.8% (45-55%), stage 3, 21.3% (15-20%), stage REM, 19.4% (20-25%).  AHI was 0.5, without significant desaturations. RDI 0.8.  REM AHI 0.7, consistent with no REM RUSH.  Supine AHI 0.7, consistent with no SUPINE RUSH.  Periodic Limb Movement Index 0/hour.       These findings were reviewed with patient.     Past medical/surgical history, family history, social history, medications and allergies were reviewed.      Problem List:  Patient Active Problem List    Diagnosis Date Noted     Supervision of other high-risk pregnancy 12/22/2013     Priority: Medium     Problem list name updated by automated process. Provider to review and confirm   Problem list name updated by automated process. 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    Future pregnancies need genetic consultation.  Risk of premature ovarian failure in patient.       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  Patient (mom) has same genetic balanced translocation.  See [facility] genetic consultation.  Abnormal fetal echocardiogram (abnormal cardiac axis).  Baby will need a SIVA (transthoracic echo) after 24 hours after birth prior to d/c home.         GBS (group B streptococcus) UTI complicating pregnancy 07/15/2013     Priority: Medium     Treated 7/13       Need for Tdap vaccination 07/11/2013     Priority: Medium     CARDIOVASCULAR SCREENING; LDL GOAL LESS THAN 160 06/06/2013     Priority: Medium        Impression/Plan:    1. Negative for sleep apnea or other sleep fragmenting conditions     Patient was reassured regarding absence of sleep apnea or other sleep disorders. Snoring has reduced with weight loss and she was encouraged to continue weight management.     I spent a total of 15 minutes for this appointment today which include time spent before, during and after the visit for patient care, counseling and coordination of care.    Dr. Devan Addison     CC: No Ref-Primary, Physician

## 2021-02-03 ENCOUNTER — VIRTUAL VISIT (OUTPATIENT)
Dept: SLEEP MEDICINE | Facility: CLINIC | Age: 36
End: 2021-02-03
Payer: COMMERCIAL

## 2021-02-03 DIAGNOSIS — R06.83 SNORING: Primary | ICD-10-CM

## 2021-02-03 PROCEDURE — 99212 OFFICE O/P EST SF 10 MIN: CPT | Mod: GT | Performed by: INTERNAL MEDICINE

## 2021-04-06 ENCOUNTER — OFFICE VISIT - HEALTHEAST (OUTPATIENT)
Dept: FAMILY MEDICINE | Facility: CLINIC | Age: 36
End: 2021-04-06

## 2021-04-06 DIAGNOSIS — Z20.822 EXPOSURE TO COVID-19 VIRUS: ICD-10-CM

## 2021-04-07 ENCOUNTER — AMBULATORY - HEALTHEAST (OUTPATIENT)
Dept: FAMILY MEDICINE | Facility: CLINIC | Age: 36
End: 2021-04-07

## 2021-04-07 DIAGNOSIS — Z20.822 EXPOSURE TO COVID-19 VIRUS: ICD-10-CM

## 2021-04-07 LAB
SARS-COV-2 PCR COMMENT: NORMAL
SARS-COV-2 RNA SPEC QL NAA+PROBE: NEGATIVE
SARS-COV-2 VIRUS SPECIMEN SOURCE: NORMAL

## 2021-04-08 ENCOUNTER — COMMUNICATION - HEALTHEAST (OUTPATIENT)
Dept: SCHEDULING | Facility: CLINIC | Age: 36
End: 2021-04-08

## 2021-04-22 ENCOUNTER — COMMUNICATION - HEALTHEAST (OUTPATIENT)
Dept: FAMILY MEDICINE | Facility: CLINIC | Age: 36
End: 2021-04-22

## 2021-04-22 ENCOUNTER — OFFICE VISIT - HEALTHEAST (OUTPATIENT)
Dept: FAMILY MEDICINE | Facility: CLINIC | Age: 36
End: 2021-04-22

## 2021-04-22 DIAGNOSIS — R20.2 PARESTHESIAS: ICD-10-CM

## 2021-04-22 DIAGNOSIS — Z20.822 EXPOSURE TO COVID-19 VIRUS: ICD-10-CM

## 2021-04-22 DIAGNOSIS — R10.32 ABDOMINAL PAIN, LEFT LOWER QUADRANT: ICD-10-CM

## 2021-04-23 ENCOUNTER — COMMUNICATION - HEALTHEAST (OUTPATIENT)
Dept: FAMILY MEDICINE | Facility: CLINIC | Age: 36
End: 2021-04-23

## 2021-04-23 ENCOUNTER — AMBULATORY - HEALTHEAST (OUTPATIENT)
Dept: FAMILY MEDICINE | Facility: CLINIC | Age: 36
End: 2021-04-23

## 2021-04-23 DIAGNOSIS — Z20.822 SUSPECTED COVID-19 VIRUS INFECTION: ICD-10-CM

## 2021-05-05 ENCOUNTER — HOSPITAL ENCOUNTER (OUTPATIENT)
Dept: ULTRASOUND IMAGING | Facility: CLINIC | Age: 36
Discharge: HOME | End: 2021-05-05
Attending: FAMILY MEDICINE
Payer: COMMERCIAL

## 2021-05-05 DIAGNOSIS — R10.2 PELVIC PAIN: ICD-10-CM

## 2021-05-05 DIAGNOSIS — R10.32 ABDOMINAL PAIN, LEFT LOWER QUADRANT: ICD-10-CM

## 2021-05-27 ENCOUNTER — OFFICE VISIT - HEALTHEAST (OUTPATIENT)
Dept: FAMILY MEDICINE | Facility: CLINIC | Age: 36
End: 2021-05-27

## 2021-05-27 VITALS
TEMPERATURE: 97.8 F | RESPIRATION RATE: 16 BRPM | OXYGEN SATURATION: 98 % | HEART RATE: 58 BPM | DIASTOLIC BLOOD PRESSURE: 88 MMHG | SYSTOLIC BLOOD PRESSURE: 108 MMHG

## 2021-05-27 VITALS — HEART RATE: 83 BPM | SYSTOLIC BLOOD PRESSURE: 122 MMHG | DIASTOLIC BLOOD PRESSURE: 60 MMHG | OXYGEN SATURATION: 98 %

## 2021-05-27 DIAGNOSIS — R20.2 PARESTHESIAS: ICD-10-CM

## 2021-05-30 VITALS — WEIGHT: 170 LBS | BODY MASS INDEX: 32.65 KG/M2

## 2021-05-30 VITALS — BODY MASS INDEX: 30.04 KG/M2 | WEIGHT: 159 LBS

## 2021-05-30 VITALS — BODY MASS INDEX: 32.58 KG/M2 | WEIGHT: 169.6 LBS

## 2021-05-30 VITALS — BODY MASS INDEX: 33.81 KG/M2 | WEIGHT: 176 LBS

## 2021-05-30 VITALS — WEIGHT: 178 LBS | BODY MASS INDEX: 34.19 KG/M2

## 2021-05-30 VITALS — BODY MASS INDEX: 32.76 KG/M2 | WEIGHT: 173.5 LBS | HEIGHT: 61 IN

## 2021-05-30 VITALS — BODY MASS INDEX: 32.64 KG/M2 | WEIGHT: 169.9 LBS

## 2021-05-30 VITALS — BODY MASS INDEX: 32.28 KG/M2 | HEIGHT: 61 IN | WEIGHT: 171 LBS

## 2021-05-30 VITALS — WEIGHT: 166 LBS | BODY MASS INDEX: 31.89 KG/M2

## 2021-05-30 VITALS — BODY MASS INDEX: 33.42 KG/M2 | WEIGHT: 174 LBS

## 2021-05-30 VITALS — BODY MASS INDEX: 34 KG/M2 | WEIGHT: 177 LBS

## 2021-05-30 VITALS — BODY MASS INDEX: 33.23 KG/M2 | WEIGHT: 173 LBS

## 2021-05-31 VITALS — BODY MASS INDEX: 29.45 KG/M2 | WEIGHT: 156 LBS | HEIGHT: 61 IN

## 2021-05-31 VITALS — HEIGHT: 61 IN | WEIGHT: 156 LBS | BODY MASS INDEX: 29.45 KG/M2

## 2021-05-31 VITALS — BODY MASS INDEX: 29.76 KG/M2 | WEIGHT: 157.5 LBS

## 2021-05-31 VITALS — WEIGHT: 154.6 LBS | BODY MASS INDEX: 29.21 KG/M2

## 2021-05-31 VITALS — BODY MASS INDEX: 29.1 KG/M2 | WEIGHT: 154 LBS

## 2021-05-31 VITALS — BODY MASS INDEX: 29.44 KG/M2 | WEIGHT: 155.8 LBS
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
## 2021-06-01 VITALS — WEIGHT: 146 LBS | BODY MASS INDEX: 27.59 KG/M2

## 2021-06-01 NOTE — TELEPHONE ENCOUNTER
"New Appointment Needed  What is the reason for the visit:    Pregnancy Confirmation Appt Needed  When was the first day of your last menstrual cycle?: 08/05 -possibly; patient states her menstrual cycle was \"messed-up\"  Have you done a home pregnancy test?: Yes taken 09/20/19 but was Negative    Provider Preference: Patient states would prefer PCP   How soon do you need to be seen?: prefers any times on Monday,Tuesday,  Wednesday  Waitlist offered?: No  Okay to leave a detailed message:  Yes    "

## 2021-06-01 NOTE — TELEPHONE ENCOUNTER
Left message to call back for: Patient. *COULD NOT LVM AS IT SAID MAILBOX WAS FULL*  Information to relay to patient:  Please help schedule a pregnancy confirmation for patient. Next available is just fine. Thank you.

## 2021-06-01 NOTE — PROGRESS NOTES
ASSESSMENT/PLAN:  1. Acute non-recurrent maxillary sinusitis  34-year-old female with sinusitis and early pregnancy.  Will treat with amoxicillin for 10 days.  She feels as prompted by her allergies and we discussed that it is okay for her to take loratadine and Flonase nasal spray as needed.  Follow-up if symptoms are increasing or unresolving.  - amoxicillin (AMOXIL) 500 MG capsule; Take 1 capsule (500 mg total) by mouth 3 (three) times a day for 10 days.  Dispense: 30 capsule; Refill: 0  - loratadine (CLARITIN) 10 mg tablet; Take 1 tablet (10 mg total) by mouth daily.  Dispense: 30 tablet; Refill: 2  - fluticasone (VERAMYST) 27.5 mcg/actuation nasal spray; 2 sprays per nostril daily  Dispense: 10 g; Refill: 12    2. Pregnancy, unspecified gestational age  34-year-old G4, P3 at approximately 5 weeks and 3 days gestation with a positive blood type.  Prior pregnancies were complicated by gestational diabetes although she is dramatically changed her diet since last pregnancy and lost weight which may affect her testing positive for gestational diabetes during this pregnancy..  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  We discussed doing presented a testing during this pregnancy.  She is scheduled for a 6 to 7-week ultrasound.  She is prescribed Reglan 5 mg every 8 hours as needed for nausea.  She is prescribed a prenatal vitamin and will do first OB labs today.  We will complete her physical at the next visit.  Will also be due for her flu shot.  - metoclopramide (REGLAN) 5 MG tablet; Take 1 tablet (5 mg total) by mouth 3 (three) times a day.  Dispense: 30 tablet; Refill: 1  - prenat75-iron fum-folic ac-om3 (ONE A DAY WOMEN'S PRENATAL DHA) 28 mg iron- 800 mcg Cmpk; Take 1 tablet by mouth daily.  Dispense: 90 each; Refill: 4  - ABO/RH Typing (OP order)  - Hepatitis B Surface antigen (HBsAG)  - HIV Antigen/Antibody Screening Cascade  - HM1(CBC and  Differential)  - HML Antibody Screen  - RPR  - Rubella Immune Status (IgG)  - Urinalysis Macroscopic  - Culture, Urine  - HM1 (CBC with Diff)  - US OB < 14 Weeks With Transvaginal; Future    3. Hyperthyroidism  History of hyperthyroidism, most recent thyroid levels were normal off of medications.  Will check thyroid levels today.  - Thyroid Cascade      Dragon dictation was used for this note.  Speech recognition errors are a possibility.    No follow-ups on file.  There are no Patient Instructions on file for this visit.    Orders Placed This Encounter   Procedures     Culture, Urine     US OB < 14 Weeks With Transvaginal     Standing Status:   Future     Standing Expiration Date:   9/30/2020     Order Specific Question:   Reason for Exam (Describe Symptoms):     Answer:   early us for dates     Order Specific Question:   Is the patient pregnant?     Answer:   Yes     Order Specific Question:   Can the procedure be changed per Radiologist protocol?     Answer:   Yes     ABO/RH Typing (OP order)     Hepatitis B Surface antigen (HBsAG)     HIV Antigen/Antibody Screening Cascade     HML Antibody Screen     Order Specific Question:   Antibody history:     Answer:   no     Order Specific Question:   Last Rhogam:     Answer:   n/a     RPR     Rubella Immune Status (IgG)     Urinalysis Macroscopic     HM1 (CBC with Diff)     Thyroid Cascade     Medications Discontinued During This Encounter   Medication Reason     cyclobenzaprine (FLEXERIL) 5 MG tablet Therapy completed     polyethylene glycol (MIRALAX) 17 gram packet Therapy completed     hydrOXYzine HCl (ATARAX) 50 MG tablet          CHIEF COMPLAINT;  Chief Complaint   Patient presents with     Initial Prenatal Visit     Sinus Problem     Lots of itchy throat and breaking out in hives x 2 weeks.     Fever       HISTORY OF PRESENT ILLNESS:  Neha is a 34 y.o. female presenting to the clinic today for pregnancy confirmation.  This is her fourth pregnancy.  It was not a  "planned pregnancy but she is excited about it.  Her prior pregnancies were complicated by gestational diabetes and also a balanced translocation between chromosome X and 6.  She also carries this genetic translocation.  She is having some nausea and fatigue with this pregnancy.  Her last menstrual period was August 24.  She has not yet started on a prenatal vitamin.    She is also complaining of some sinus congestion.  She states are painful and she has been running intermittent fevers.  She is very rundown and has been trying over-the-counter medications without much relief.  Symptoms have been present for almost 10 days now..    Remainder of 12-point ROS is negative.    TOBACCO USE:  Social History     Tobacco Use   Smoking Status Former Smoker   Smokeless Tobacco Never Used   Tobacco Comment    quit 2006       VITALS:  Vitals:    09/30/19 1115   BP: 110/76   Patient Site: Left Arm   Patient Position: Sitting   Cuff Size: Adult Regular   Pulse: 70   SpO2: 98%   Weight: 133 lb 11.2 oz (60.6 kg)   Height: 5' 0.83\" (1.545 m)     Wt Readings from Last 3 Encounters:   09/30/19 133 lb 11.2 oz (60.6 kg)   09/25/19 135 lb 7 oz (61.4 kg)   08/05/19 141 lb 14.4 oz (64.4 kg)     Body mass index is 25.41 kg/m .    PHYSICAL EXAM:  GENERAL APPEARANCE: Alert, cooperative, no distress, appears stated age  HEAD: Normocephalic, without obvious abnormality, atraumatic  EYES:  PERRL, conjunctiva/corneas clear, EOM's intact, fundi     benign, both eyes       EARS: Normal TM's and external ear canals, both ears  NOSE:  Nares normal, septum midline, mucosa normal, no drainage or sinus tenderness  THROAT: Lips, mucosa, and tongue normal; teeth and gums normal  NECK: Supple, symmetrical, trachea midline, no adenopathy;    thyroid:  No enlargement/tenderness/nodules; no carotid    bruit or JVD  BACK: Symmetric, no curvature, ROM normal, no CVA tenderness  LUNGS: Clear to auscultation bilaterally, respirations unlabored  CHEST WALL: No " tenderness or deformity  HEART: Regular rate and rhythm, S1 and S2 normal, no murmur, rub or gallop    RECENT RESULTS  No results found for this or any previous visit (from the past 48 hour(s)).    MEDICATIONS:  Current Outpatient Medications   Medication Sig Dispense Refill     hydrocortisone 2.5 % cream APPLY EXTERNALLY TO THE AFFTECTED AREA TWICE DAILY as needed 28 g 2     oxybutynin (DITROPAN XL) 10 MG ER tablet Take 1 tablet (10 mg total) by mouth daily. 90 tablet 2     amoxicillin (AMOXIL) 500 MG capsule Take 1 capsule (500 mg total) by mouth 3 (three) times a day for 10 days. 30 capsule 0     fluticasone (VERAMYST) 27.5 mcg/actuation nasal spray 2 sprays per nostril daily 10 g 12     loratadine (CLARITIN) 10 mg tablet Take 1 tablet (10 mg total) by mouth daily. 30 tablet 2     metoclopramide (REGLAN) 5 MG tablet Take 1 tablet (5 mg total) by mouth 3 (three) times a day. 30 tablet 1     prenat75-iron fum-folic ac-om3 (ONE A DAY WOMEN'S PRENATAL DHA) 28 mg iron- 800 mcg Cmpk Take 1 tablet by mouth daily. 90 each 4     No current facility-administered medications for this visit.

## 2021-06-01 NOTE — PROGRESS NOTES
ASSESSMENT/PLAN:  Upper respiratory tract infection, unspecified type  Pleasant 34 y.o.  female presented with URI.  Examination was unremarkable today.  The patient may continue with OTC symptomatic treatment.  Rest, hydration, nutritional support, and time were counseled.  Follow up with primary care provider if the patient is not better in 1-2 weeks.  The patient verbalized understanding and agreed with the plan.    Environmental allergies  Ok to continue with hydroxyzine if this helps with her allergy symptoms.  Otherwise, I asked her to take an antihistamine.    Amenorrhea  The patient has had several negative urine pregnancy test at home.  We will check a beta hCG serum  If negative, take another pregnancy test in the next 2 weeks.  -     Beta-hCG, Qualitative, Serum    Orders Placed This Encounter   Procedures     Beta-hCG, Qualitative, Serum       CHIEF COMPLAINT:  Chief Complaint   Patient presents with     Fever     x few days     Chills     Nausea     Headache     missed cycle this month     over one week past due, did test at home was neg       HISTORY OF PRESENT ILLNESS:  Neha is a 34 y.o. female presenting to the clinic today with chills, nausea and allergy-like symptoms. She is not on any medication for her thyroid.     Allergy-like Symptoms: The last couple days she has been feeling nauseous, dizzy and feverish. She has developed an itchy nose, watery eyes, hives and a slight sore throat. These are common seasonal allergy symptoms for her. She missed her period and took 4 pregnancy tests at home, all of which were negative. She stopped taking hydroxyzine, which she takes for her allergies, because she thought she was unable to take it during pregnancy. She is not actively trying to get pregnant but would be happy if she was pregnant. She denies any cough. She insists on getting a blood draw to test for possible pregnancy.     REVIEW OF SYSTEMS:   Constitutional: Negative.   HENT: Negative.   Eyes:  "Negative.   Respiratory: Negative.   Cardiovascular: Negative.   Gastrointestinal: Negative.   Endocrine: Negative.   Genitourinary: Negative.   Musculoskeletal: Negative.   Skin: Negative.   Allergic/Immunologic: Negative.   Neurological: Negative.   Hematological: Negative.   Psychiatric/Behavioral: Negative.   All other systems are negative.    PFSH:  She has three children right now.     TOBACCO USE:  Social History     Tobacco Use   Smoking Status Former Smoker   Smokeless Tobacco Never Used   Tobacco Comment    quit 2006       VITALS:  Vitals:    09/25/19 1304   BP: 104/72   Patient Site: Left Arm   Patient Position: Sitting   Cuff Size: Adult Regular   Pulse: 78   Temp: 98.1  F (36.7  C)   TempSrc: Oral   SpO2: 100%   Weight: 135 lb 7 oz (61.4 kg)   Height: 5' 1\" (1.549 m)     Wt Readings from Last 3 Encounters:   09/25/19 135 lb 7 oz (61.4 kg)   08/05/19 141 lb 14.4 oz (64.4 kg)   03/18/19 142 lb (64.4 kg)       PHYSICAL EXAM:  Constitutional: Patient is oriented to person, place, and time. Patient appears well-developed and well-nourished. No distress.   Head: Normocephalic and atraumatic.   Right Ear: External ear normal.   Left Ear: External ear normal.   Nose: Nose normal.   Mouth/Throat: Oropharynx is clear and moist. No oropharyngeal exudate. Slight peritonsillar erythema  Eyes: Conjunctivae and EOM are normal. Pupils are equal, round, and reactive to light. Right eye exhibits no discharge. Left eye exhibits no discharge. No scleral icterus.   Neck: Neck supple. No JVD present. No tracheal deviation present. No thyromegaly present.   Cardiovascular: Normal rate, regular rhythm, normal heart sounds and intact distal pulses. No murmur heard.   Pulmonary/Chest: Effort normal and breath sounds normal. No stridor. No respiratory distress. Patient has no wheezes, no rales, exhibits no tenderness.   Neurological: Patient is alert and oriented to person, place, and time.  Skin: Skin is warm and dry. No rash " noted. Patient is not diaphoretic. No erythema. No pallor.      ADDITIONAL HISTORY SUMMARIZED (2): Reviewed note from 8/5/19 about insomnia and urinary frequency.  DECISION TO OBTAIN EXTRA INFORMATION (1): None.   RADIOLOGY TESTS (1): None.  LABS (1): Ordered labs today.   MEDICINE TESTS (1): None.  INDEPENDENT REVIEW (2 each): None.     IPaula,  am scribing for and in the presence of, Dr. Pink.    I, Dr. Pink, personally performed the services described in this documentation, as scribed by Paula Segal in my presence, and it is both accurate and complete.    MEDICATIONS:  Current Outpatient Medications   Medication Sig Dispense Refill     cyclobenzaprine (FLEXERIL) 5 MG tablet Take 1 tablet (5 mg total) by mouth 3 (three) times a day as needed for muscle spasms. 25 tablet 0     hydrocortisone 2.5 % cream APPLY EXTERNALLY TO THE AFFTECTED AREA TWICE DAILY as needed 28 g 2     hydrOXYzine HCl (ATARAX) 50 MG tablet Take 1-2 tablets ( mg total) by mouth at bedtime as needed. 30 tablet 2     oxybutynin (DITROPAN XL) 10 MG ER tablet Take 1 tablet (10 mg total) by mouth daily. 90 tablet 2     polyethylene glycol (MIRALAX) 17 gram packet Take 1 packet (17 g total) by mouth daily. 14 each 0     No current facility-administered medications for this visit.        Total data points:3

## 2021-06-01 NOTE — TELEPHONE ENCOUNTER
Patient Returning Call  Reason for call:  Returning call  Information relayed to patient:    Relayed below information to patient.  Patient has additional questions:  Yes  If YES, what are your questions/concerns:    Patient states she can come in on Monday 9/30/19 at 11:20am.  Please put her on the schedule.  Okay to leave a detailed message?: Yes

## 2021-06-01 NOTE — TELEPHONE ENCOUNTER
I pended a very generic letter under the communications tab  Please make adjustments as necessary.

## 2021-06-01 NOTE — TELEPHONE ENCOUNTER
Left message to call back for: appointment   Information to relay to patient:  Can patient come in on Monday 9/30/2019 at 11:20 am for a first OB?

## 2021-06-01 NOTE — TELEPHONE ENCOUNTER
New Appointment Needed  What is the reason for the visit:    First OB Appt Request  Have you had a pregnancy confirmation appointment at a [facility] primary care clinic?:  Yes: When in clinic on 9/25 had a possitive pregnancy test.  When was your positive home pregnancy test?:   date: NA    Provider Preference: PCP only  How soon do you need to be seen?: Next week Monday or Tuesday   Waitlist offered?: No  Okay to leave a detailed message:  Yes

## 2021-06-01 NOTE — TELEPHONE ENCOUNTER
Prior Authorization Request  Who s requesting:  Pharmacy  Pharmacy Name and Location: [facility] #[id number]  Medication Name: prenatal dha  Insurance Plan: [email or web address]   Insurance Member ID Number:  [id number]  Informed patient that prior authorizations can take up to 10 business days for response:   No  Okay to leave a detailed message: No

## 2021-06-02 VITALS — BODY MASS INDEX: 26.83 KG/M2 | WEIGHT: 142 LBS

## 2021-06-02 VITALS — HEIGHT: 61 IN | WEIGHT: 142 LBS | BODY MASS INDEX: 26.81 KG/M2

## 2021-06-02 NOTE — PROGRESS NOTES
ASSESSMENT:  1. Twin gestation in first trimester, unspecified multiple gestation type  34-year-old G4, P2 at 7 weeks and 5 days gestation with a twin pregnancy.  She has a small subchorionic bleed.  We will check a beta-hCG level today.  She does need a repeat ultrasound however since I am referring her to obstetrics due to the twin gestation, will defer imaging until she is seen there.  Questions are answered.  She is taking a prenatal vitamin.  She still has some residual urine symptoms and will check a UA UC.  Her prior pregnancies were complicated by gestational diabetes and she is concerned that may already be an issue for her.  For that reason we will do 1 hour glucose challenge today.  - Ambulatory referral to Obstetrics / Gynecology  - Beta-hCG, Quantitative  - Glucose,Gestational Challenge (1 Hour)  - Urinalysis-UC if Indicated      PLAN:  There are no Patient Instructions on file for this visit.    Orders Placed This Encounter   Procedures     Beta-hCG, Quantitative     Glucose,Gestational Challenge (1 Hour)     Urinalysis-UC if Indicated     Ambulatory referral to Obstetrics / Gynecology     Referral Priority:   Routine     Referral Type:   Consultation     Referral Reason:   Evaluation and Treatment     Requested Specialty:   Obstetrics and Gynecology     Number of Visits Requested:   1     Medications Discontinued During This Encounter   Medication Reason     oxybutynin (DITROPAN XL) 10 MG ER tablet        No follow-ups on file.    CHIEF COMPLAINT:  Chief Complaint   Patient presents with     Hospital Visit Follow Up     Concerns     Has some questions about the bleeding and more about the pregnancy.       HISTORY OF PRESENT ILLNESS:  Neha is a 34 y.o. female presenting to the clinic today for a follow up regarding her vaginal bleeding while pregnant.     Bleeding: Pt said she has had less vaginal bleeding compared to when she was admitted into the ED on 10/13/19, but it is still present. Multiple US  were ran and found that pt is carrying twins. Pt also was diagnosed with a subchorionic hematoma of the first trimester.    UTI: She said there is still a little stinging at the tip of her vagina. She said there is still a little itching as well. The frequency of urnation has decreased. Pt is not taking oxybutinin anymore. Pt said she originally thought she had a yeast infection and treated it with monostat. This had little effect.     Blood sugar: She said the other day her blood sugar was very low and she had blurry vision. She would like to get tested for her twins possibly having premature diabetes.     REVIEW OF SYSTEMS:   All other systems are negative.    PFSH:  As reviewed below.     TOBACCO USE:  Social History     Tobacco Use   Smoking Status Former Smoker   Smokeless Tobacco Never Used   Tobacco Comment    quit 2006       VITALS:  Vitals:    10/22/19 1323   BP: 112/78   Patient Site: Right Arm   Patient Position: Sitting   Cuff Size: Adult Regular   Pulse: 72   SpO2: 98%   Weight: 140 lb 14.4 oz (63.9 kg)     Wt Readings from Last 3 Encounters:   10/22/19 140 lb 14.4 oz (63.9 kg)   10/13/19 145 lb (65.8 kg)   09/30/19 133 lb 11.2 oz (60.6 kg)     Body mass index is 26.77 kg/m .    PHYSICAL EXAM:    General Appearance:  Alert, cooperative, no distress, appears stated age                                       ADDITIONAL HISTORY SUMMARIZED (2): 10/13/19 ED note regarding vaginal bleeding reviewed.  DECISION TO OBTAIN EXTRA INFORMATION (1): None.   RADIOLOGY TESTS (1): None.  LABS (1): Labs ordered.  MEDICINE TESTS (1): None.  INDEPENDENT REVIEW (2 each): None.     The visit lasted a total of 18 minutes face to face with the patient. Over 50% of the time was spent counseling and educating the patient about vaginal bleeding and pregnancy.    Amparo WRAY, am scribing for and in the presence of, Dr. Shore.    Dr. Silviano WRAY, personally performed the services described in this documentation, as scribed by  Amparo Mendoza in my presence, and it is both accurate and complete.    MEDICATIONS:  Current Outpatient Medications   Medication Sig Dispense Refill     cetirizine (ZYRTEC) 10 MG tablet Take 1 tablet (10 mg total) by mouth daily. 90 tablet 2     fluticasone (VERAMYST) 27.5 mcg/actuation nasal spray 2 sprays per nostril daily 10 g 12     hydrocortisone 2.5 % cream APPLY EXTERNALLY TO THE AFFTECTED AREA TWICE DAILY as needed 28 g 2     loratadine (CLARITIN) 10 mg tablet Take 1 tablet (10 mg total) by mouth daily. 30 tablet 2     metoclopramide (REGLAN) 5 MG tablet Take 1 tablet (5 mg total) by mouth 3 (three) times a day. 30 tablet 1     PNV,calcium 72-iron-folic acid 27 mg iron- 1 mg Tab Take 1 tablet by mouth daily. 90 tablet 3     prenat75-iron fum-folic ac-om3 (ONE A DAY WOMEN'S PRENATAL DHA) 28 mg iron- 800 mcg Cmpk Take 1 tablet by mouth daily. 90 each 4     prenatal vit 10-iron-folic-dha (VITAFOL-OB+DHA) 65-1-250 mg Cmpk Take 1 tablet by mouth daily. 90 each 3     No current facility-administered medications for this visit.        Total data points: 3

## 2021-06-03 VITALS
WEIGHT: 140.9 LBS | HEART RATE: 72 BPM | DIASTOLIC BLOOD PRESSURE: 78 MMHG | BODY MASS INDEX: 26.77 KG/M2 | OXYGEN SATURATION: 98 % | SYSTOLIC BLOOD PRESSURE: 112 MMHG

## 2021-06-03 VITALS
HEART RATE: 78 BPM | HEIGHT: 61 IN | BODY MASS INDEX: 25.57 KG/M2 | WEIGHT: 135.44 LBS | OXYGEN SATURATION: 100 % | TEMPERATURE: 98.1 F | SYSTOLIC BLOOD PRESSURE: 104 MMHG | DIASTOLIC BLOOD PRESSURE: 72 MMHG

## 2021-06-03 VITALS
WEIGHT: 133.7 LBS | HEART RATE: 70 BPM | HEIGHT: 61 IN | DIASTOLIC BLOOD PRESSURE: 76 MMHG | SYSTOLIC BLOOD PRESSURE: 110 MMHG | BODY MASS INDEX: 25.24 KG/M2 | OXYGEN SATURATION: 98 %

## 2021-06-03 VITALS — WEIGHT: 141.9 LBS | BODY MASS INDEX: 26.79 KG/M2 | HEIGHT: 61 IN

## 2021-06-04 VITALS
HEART RATE: 75 BPM | SYSTOLIC BLOOD PRESSURE: 100 MMHG | DIASTOLIC BLOOD PRESSURE: 60 MMHG | BODY MASS INDEX: 29.81 KG/M2 | WEIGHT: 156.9 LBS | OXYGEN SATURATION: 98 %

## 2021-06-04 VITALS
HEART RATE: 84 BPM | SYSTOLIC BLOOD PRESSURE: 110 MMHG | DIASTOLIC BLOOD PRESSURE: 68 MMHG | WEIGHT: 180 LBS | BODY MASS INDEX: 34.01 KG/M2

## 2021-06-04 VITALS — HEIGHT: 61 IN | WEIGHT: 170 LBS | BODY MASS INDEX: 32.1 KG/M2

## 2021-06-04 VITALS
WEIGHT: 174.7 LBS | DIASTOLIC BLOOD PRESSURE: 62 MMHG | SYSTOLIC BLOOD PRESSURE: 120 MMHG | HEART RATE: 88 BPM | BODY MASS INDEX: 33.01 KG/M2

## 2021-06-04 VITALS
DIASTOLIC BLOOD PRESSURE: 64 MMHG | HEART RATE: 97 BPM | SYSTOLIC BLOOD PRESSURE: 120 MMHG | OXYGEN SATURATION: 98 % | BODY MASS INDEX: 33.29 KG/M2 | WEIGHT: 176.2 LBS

## 2021-06-04 VITALS
OXYGEN SATURATION: 98 % | BODY MASS INDEX: 31.73 KG/M2 | HEART RATE: 89 BPM | WEIGHT: 167 LBS | SYSTOLIC BLOOD PRESSURE: 100 MMHG | DIASTOLIC BLOOD PRESSURE: 60 MMHG

## 2021-06-04 VITALS — BODY MASS INDEX: 27.45 KG/M2 | HEIGHT: 61 IN | TEMPERATURE: 99 F | WEIGHT: 145.38 LBS

## 2021-06-04 VITALS
DIASTOLIC BLOOD PRESSURE: 90 MMHG | OXYGEN SATURATION: 96 % | WEIGHT: 163.25 LBS | BODY MASS INDEX: 30.85 KG/M2 | HEART RATE: 65 BPM | TEMPERATURE: 98.1 F | SYSTOLIC BLOOD PRESSURE: 127 MMHG

## 2021-06-04 VITALS
HEIGHT: 61 IN | DIASTOLIC BLOOD PRESSURE: 80 MMHG | HEART RATE: 62 BPM | BODY MASS INDEX: 30.7 KG/M2 | OXYGEN SATURATION: 97 % | SYSTOLIC BLOOD PRESSURE: 120 MMHG | WEIGHT: 162.6 LBS

## 2021-06-04 VITALS
DIASTOLIC BLOOD PRESSURE: 70 MMHG | HEART RATE: 88 BPM | TEMPERATURE: 98.1 F | SYSTOLIC BLOOD PRESSURE: 120 MMHG | WEIGHT: 167.31 LBS | BODY MASS INDEX: 31.61 KG/M2

## 2021-06-04 VITALS
HEART RATE: 84 BPM | DIASTOLIC BLOOD PRESSURE: 72 MMHG | WEIGHT: 170.3 LBS | BODY MASS INDEX: 33.26 KG/M2 | SYSTOLIC BLOOD PRESSURE: 110 MMHG

## 2021-06-04 NOTE — PATIENT INSTRUCTIONS - HE
Nasal saline.  Claritin, Flonase once per nostril.  2. Humidifier, push fluids, rest, acetaminophen as needed q 4-6 hours for fever and myalgias.

## 2021-06-04 NOTE — PROGRESS NOTES
"Clinic Note   SUBJECTIVE:   Chief Complaint   Patient presents with     Possible Sinus Infection     Fevers, chills, soar throat for x3days   Neha Mcginnis is currently in her second trimester of pregnancy, has had ongoing chronic sinusitis due to allergies.  Is been using nasal saline for the symptoms.  She is also tried antihistamines with no improvement.  Now she has sinus pain, sore throat, ear pressure and pain bilaterally, headache, cough in the last 3 days.  Review of systems: Negative for nausea, vomiting, dizziness.  Otherwise as in HPI.  Allergies   Allergen Reactions     Other Allergy (See Comments) Rash     [place] CAPS, 03/07/2014.       OBJECTIVE:   Temp 99  F (37.2  C) (Oral)   Ht 5' 0.83\" (1.545 m)   Wt 145 lb 6 oz (65.9 kg)   LMP 08/01/2019   BMI 27.62 kg/m    HEENT: Right TM dull, left TM dull, injected, retracted normal-appearing  canals bilaterally. Nose exam: No discharge. Sinuses pale and boggy, clear rhinorrhea. Oral   exam: The patient had moderate erythema, inflammation of oropharynx.   NECK: Supple, mildly tender and bilateral anterior cervical lymphadenopathy.   LUNGS: Clear to auscultation.  Equal chest rise, no retractions.  CARDIOVASCULAR: S1, S2, regular rate and rhythm, no murmur.   ASSESSMENT:  1. OME (otitis media with effusion), left  amoxicillin (AMOXIL) 500 MG capsule   2. Nasal congestion  oxymetazoline (AFRIN) 0.05 % nasal spray    Ambulatory referral to Allergy     PLAN:   Discussed the safety of amoxicillin in pregnancy, discussed other OTC treatments, will try Afrin which is also considered safe in pregnancy.  May use Claritin, Robitussin-DM, humidifier, nasal saline.  Also reviewed Flonase, advised take this sparingly, 1 puff in each sinus daily if needed.  Follow-up with allergist as planned.  Deepthi Peraza, MS, PA-C    "

## 2021-06-05 VITALS
BODY MASS INDEX: 30.71 KG/M2 | HEART RATE: 57 BPM | DIASTOLIC BLOOD PRESSURE: 82 MMHG | WEIGHT: 161.6 LBS | SYSTOLIC BLOOD PRESSURE: 130 MMHG | OXYGEN SATURATION: 97 %

## 2021-06-05 VITALS
OXYGEN SATURATION: 98 % | DIASTOLIC BLOOD PRESSURE: 86 MMHG | BODY MASS INDEX: 31.35 KG/M2 | HEART RATE: 73 BPM | SYSTOLIC BLOOD PRESSURE: 148 MMHG | WEIGHT: 165 LBS

## 2021-06-05 NOTE — TELEPHONE ENCOUNTER
I have attempted to reach out to [facility] twice and have not been successful to retrieve records.  First attempt: called and left a voicemail in the AM of today. No call back to my direct line. Second attempt: was routed to Joss, the medical records lead assistant and no answer, left a voice message for her just now at 2:40 PM. Will wait to see if she will return call.

## 2021-06-05 NOTE — TELEPHONE ENCOUNTER
Reason contacted:  Appointment question  Information relayed:  Below message. Patient will call [facility] to have records faxed to me directly.   Additional questions:  No  Further follow-up needed:  No  Okay to leave a detailed message:  No

## 2021-06-05 NOTE — TELEPHONE ENCOUNTER
Upcoming Appointment Question  When is the appointment: 2/11/2020  What is your appointment for?: OB prenatal  Who is your appointment scheduled with?: PCP only  What is your question/concern?: Just wanted provider to know that she has lost one of the twins and would like to go back to seeing her instead of [facility].  Okay to leave a detailed message?: Yes, if need.

## 2021-06-05 NOTE — TELEPHONE ENCOUNTER
I received a call back from Joss and she will be faxing over records of what they have on pt. Once received, I will put on provider's desk.

## 2021-06-05 NOTE — TELEPHONE ENCOUNTER
I called pt and we spoke. Pt had went online to request MARY from [facility] and that is probably the cause of the delay.   I informed pt that I will call [facility] and see if I can speed up the process. Pt understands.

## 2021-06-05 NOTE — PATIENT INSTRUCTIONS - HE
Assessment:     Allergic rhinitis with sensitivity to dust mites, ragweed and cat.     Plan:     Environmental avoidance measures.  Dust mite covers for pillow and mattress  Antihistamines can be used regularly such as cetirizine 10 mg twice daily  Recommend fluticasone 1 spray each nostril twice daily.  Don't use afrin for more than 4 days in a row.

## 2021-06-05 NOTE — PROGRESS NOTES
Assessment:     Allergic rhinitis with sensitivity to dust mites, ragweed and cat.     Plan:     Environmental avoidance measures.  Dust mite covers for pillow and mattress  Antihistamines can be used regularly such as cetirizine 10 mg twice daily  Recommend fluticasone 1 spray each nostril twice daily.  Stop using Afrin   ____________________________________________________________________________     Patient comes in today for concerns of nasal congestion rhinorrhea.  She gets itchy skin and rash.  She has a history of urticaria.  Previous allergy testing showed allergy to dust mite and cat and ragweed.  This was done 2 years ago.  She does not have any cats in her home.  She does not have allergy encasements on her pillow or mattress.  She is using Afrin daily over the past month.  She is using cetirizine 10 mg daily.    Physical Exam:  General:  Alert and Oriented.  Eyes:  Sclera clear. Nose: pale boggy mucosal membranes.  Throat:  pink moist, no lesions.  Lungs:  clear to auscultation. Skin:  no rashes

## 2021-06-06 NOTE — PROGRESS NOTES
"34 y.o.  at 28 weeks gestation. She notes good fetal movement and continues on her vitamin b6. Her appetite has increased quite a bit lately, where she has had intense cravings for sweets.1 hr gtt is abnormal- with hx of GDM will proceed with BS checks and GDM education.  We reviewed BS goals.   Follow up in 2 weeks.    Sleep: Patient confesses \"feeling very weird\" lately. Three days prior to her most recent lab work, she was feeling very tired. It confuses her that her iron levels from this lab work were within normal range. She will get a full night of sleep, but still wakes up very fatigued.  She thinks she may have sleep apnea, as she wakes up frequently to herself snoring. Patient is also wondering if this could be attributed to her thyroid.     I, Amparo Mendoza, am scribing for and in the presence of, Dr. Shore.    I, Dr. Shore, personally performed the services described in this documentation, as scribed by Amparo Mendoza in my presence, and it is both accurate and complete.   "

## 2021-06-06 NOTE — PROGRESS NOTES
34 y.o.  at 23 weeks and 5 days gestation. She is transferring to our clinic as she lost her second twin an the OB she was seeing left. I have seen her for her prior pregnancy so she chose to return here.  Reviewed records and labs from [facility].   She notes good fetal movement and denies any bleeding or cramping. It was confirmed the gender is female. Patient discovered she lost her second twin at ~6 weeks gestation. She reports some back pain that has been alleviated with regular stretching. It was confirmed patient is interested in glucose testing soon due to an increase in cravings for sweets. 1 hr gtt and future thyroid lab draws were ordered to be done within the next 1-2 weeks. Follow up for next OB check up in 4 weeks.    Ears: Patient has had some pressure and tingling in her ears as of yesterday. She requested to get this checked out today in office.     Allergies: Patient has been breaking out in hives along her arms which she attributes to her dust mite allergy. She has been seeing allergist Dr. Jung for this. Patient was counseled today to take 1 tab benadryl at night.      Hyperthyroidism.  Needs recheck of her levels.Will check when she returns for her 1 hr GTT. Denies symptoms.    Physical exam: Ears: Normal.  Neck: Positive for thyromegaly.     I, Amparo Mendoza, am scribing for and in the presence of, Dr. Shore.    I, Dr. Shore, personally performed the services described in this documentation, as scribed by Amparo Mendoza in my presence, and it is both accurate and complete.

## 2021-06-06 NOTE — TELEPHONE ENCOUNTER
Orders being requested: Thyroid and iron lab testing  Reason service is needed/diagnosis: Tiredness.   When are orders needed by: as soon as possible, lab appointment is this Friday 21st.   Where to send Orders: in clinic  Okay to leave detailed message?  Yes

## 2021-06-06 NOTE — TELEPHONE ENCOUNTER
Authorizing: Haily Shore MD in [facility]/OB            Diagnosis: Hyperthyroidism [E05.90]  Pregnancy, unspecified gestational age [Z34.90]     Please advise on when to schedule.

## 2021-06-07 NOTE — PROGRESS NOTES
Pt arrived to [facility] c/o pelvic pressure and vaginal itching. . 33.1wks. Gestational diabetic on insulin, AMA. Pt brought to room , placed on EFM. FHT's category I, reactive. Uterus soft and non tender, pt denies feeling ctx's. VSS. Will collect wet prep, ROM+ and UA and notify provider.  Pt states understanding of plan of care.

## 2021-06-07 NOTE — PROGRESS NOTES
Provider notified of lab results. Order for flagyl sent to pt's pharmacy. Order to send pt home, to follow up in clinic as scheduled. Pt states understanding of discharge instructions and follow care.

## 2021-06-07 NOTE — TELEPHONE ENCOUNTER
Es w/Ob office called. Patient is scheduled for a video appointment tomorrow morning. If there are any specific question or concerns she would like to inform the provider, please call and let their office know by the end of the day.    sE @ [phone], okay to leave a message even though the message says to not leave a message.

## 2021-06-07 NOTE — TELEPHONE ENCOUNTER
Spoke with Juliette, changing care to Adamaris Thompson here in our clinic.  Pt's intent was to see endocrine for hyperthyroidism, not for her GDM.  Resume GDM care within our systerm

## 2021-06-07 NOTE — TELEPHONE ENCOUNTER
Patient calling back with questions,    Does this mean that she should go back and see Juliette the educator that works with Dr Zamorano? Will Adamaris not be seeing patient anymore?    She is seeking clarification.     OKTLDM

## 2021-06-07 NOTE — TELEPHONE ENCOUNTER
"Patient calls concerned about pelvic floor pressure she has been feeling today. She is 33 weeks pregnant. Reports pelvic pressure in her vagina back to her anus.  The pressure increases when she stands up or walks. Denies any tightness or contraction feelings. The pressure is intermittent. She is unable to say how long each pressure episode lasts or how much time passes in between. Denies any vaginal bleeding or leaking of fluids. Patient states she has felt her baby move today. I asked the patient if it looks like her vagina has started to dilate at all and she is uncertain. States \"it may be a little bigger.\" Patient has voided but has not had relief in the pressure. She states she does not have a primary OB and has just been seeing \"whoever is available.\" I am unable to direct patient to a clinic provider today as it is after hours.     Patient advised to go into the [facility] ED to be checked per protocol. It is unclear if patient if having signs of  labor at this time.    Kendal Foote RN      Reason for Disposition    Increased pressure in pelvic area    Answer Assessment - Initial Assessment Questions  1. ONSET: \"When did the symptoms begin?\"         Today  2. CONTRACTIONS: \"Describe the contractions that you are having.\" (e.g., duration, frequency, regularity, severity)      Patient reports pressure in pelvic floor. Specifically in vagina and anus.  3. DWIGHT: \"What date are you expecting to deliver?\"      Currently 33 weeks. Due in early   5. FETAL MOVEMENT: \"Has the baby's movement decreased or changed significantly from normal?\"      Baby's movements are normal.  6. OTHER SYMPTOMS: \"Do you have any other symptoms?\" (e.g., leaking fluid from vagina, fever, hand/facial swelling)     Denies additional symptoms.    Protocols used: PREGNANCY - LABOR - [id number]      "

## 2021-06-07 NOTE — TELEPHONE ENCOUNTER
RN cannot approve Refill Request    RN can NOT refill this medication Protocol failed and NO refill given.      Perlita Ardon, [facility] Triage/Med Refill 5/6/2020    Requested Prescriptions   Pending Prescriptions Disp Refills     ACCU-CHEK GUIDE strips [Pharmacy Med Name: ACCU-CHEK GUIDE TEST STRIPS 100S] 200 strip 1     Sig: TEST DAILY BEFORE ALL MEALS/SNACKS AND ONCE BEFORE BEDTIME       Diabetic Supplies Refill Protocol Failed - 5/4/2020  1:13 PM        Failed - Visit with PCP or prescribing provider visit in last 6 months     Last office visit with prescriber/PCP: 10/22/2019 Haily Shore MD OR same dept: 10/22/2019 Haily Shore MD OR same specialty: 12/19/2019 Deepthi Peraza PA-C  Last physical: Visit date not found Last MTM visit: Visit date not found   Next visit within 3 mo: Visit date not found  Next physical within 3 mo: Visit date not found  Prescriber OR PCP: Haily Shore MD  Last diagnosis associated with med order: 1. Gestational diabetes mellitus (GDM) in second trimester, gestational diabetes method of control unspecified  - ACCU-CHEK GUIDE strips [Pharmacy Med Name: ACCU-CHEK GUIDE TEST STRIPS 100S]; TEST DAILY BEFORE ALL MEALS/SNACKS AND ONCE BEFORE BEDTIME  Dispense: 200 strip; Refill: 1    If protocol passes may refill for 12 months if within 3 months of last provider visit (or a total of 15 months).             Failed - A1C in last 6 months     Hemoglobin A1c   Date Value Ref Range Status   08/05/2019 5.5 3.5 - 6.0 % Final

## 2021-06-07 NOTE — PROGRESS NOTES
MIGDALIA  32w0d  Estimated Date of Delivery: 20    S: Prenatal record reviewed: Patient discovered she lost her second twin at ~6 weeks gestation. Was following [facility] until 23 weeks.      Constipation from the iron supplement- ok to take every other day. Advised not to use miralax, colace daily.      Hx of abnormal thyroid testing. Subclinical hypothyroidism s/t pregnancy per endocrine.   History of GDM, failed her 1 hour GTT.  She has been started on insulin and has followed with the endocrinologist today 2020.  Plan as follows:  - increase NPH to 17 units in the morning before breakfast  - continue NPH 12 units before supper       O: Vitals reviewed, see prenatal flowsheet for measurements.   appears well, no acute distress.  Normal respiratory effort.  Abdomen is soft. No LE swelling.     A/P:     Supervision of high risk pregnancy, loss of twin B @ 6 weeks, currently gomez pregnancy.   Advanced maternal age. She had normal NIPT and carrier screening (see 19 records in media). - Mom has another child with hx of balanced translocation between chromosomes X and 6.     - Silviano pt, prenatal record reviewed.  - UA today  - Breastfeeding discussed  - Sx/signs  labor reviewed    Gestational diabetes: She failed 50 g of glucose tolerance test on 3/12/2020.She had hx of GDM with all previous pregnancies. Reviewed glucose log, FBG at goal but 2 hours glucoses post meal were not at goal.   - Endocrine following  - NPH insulin increased to 17 units with breakfast and continue 12 units before dinner.   - she will continue with light exercise and healthy diet  - She will continue to check fasting glucose and 1-2 hours after meal.   - Check UA today  - Will need  monitoring at 34 weeks and growth ultrasound  - IOL @ 39 or pending insulin control    Abnormal thyroid function test: she did have abnormal thyroid function since  consistent with subclinical hyperthyroidism. Most of TFTs were  checked during pregnancy. This was thought to be related to pregnancy.   - Endocrine following  - lab at the last visit (3/26/2020) was consistent with subclinical hyperthyroidism likely related to pregnancy  - plan to repeat 6-8 weeks after delivery    COVID-19 counseling:   - recommend iron supplementation due to blood shortage  - avoid travel, social distancing + hand hygiene, avoid visitors at home  - current breastfeeding recommendations  - team based prenatal care by our [facility] group  - delivering physician may change  - visitor policy for imaging tests, office visits & L&D  - what to do if symptoms ([email or web address] or call the triage team at [phone])      I spent 40 minutes with the patient, >50% of which was in counseling regarding the patient's medical issues as noted above.    Follow up: 2 weeks for MIGDALIA in office, high risk preg with GDM    Wandy Monte MD

## 2021-06-07 NOTE — TELEPHONE ENCOUNTER
FYI - Status Update  Who is Calling: Patient  Update: Returned call, has availability to re-schedule to this week. So appointment re-scheduled to 5/01/20, 9:00 AM.  Okay to leave a detailed message?:  No return call needed

## 2021-06-07 NOTE — TELEPHONE ENCOUNTER
I reached out to pt and no answer. I left a voice message. If pt returns call please ask if she willing to come in this week (04/27-05/01) to be seen per Dr. Shore. If yes, please help reschedule. Or direct over to Toyin at: [phone].

## 2021-06-07 NOTE — PROGRESS NOTES
Subjective:  Neha is here alone for a routine prenatal visit at 35w1d.  She has c/o Ongoing irritation vaginally and discharge.  She noticed some improvement after using the clindamycin gel very quickly came back.  She is not noticing any odor and states the irritation is more external.  No fevers or pelvic pain.  She is having some occasional contractions.  No headaches, vision changes or right upper quadrant pain.  Her edema is moderate not worsening.  She is feeling good fetal movement..   Appetite is normal. Interval weight gain is appropriate.     Objective:   See flowsheet.    Assessment:  1. Supervision of other high risk pregnancies, second trimester  Wet Prep, Vaginal    Group B Strep Screen by PCR       Plan:      1. 35-year-old  female with high risk pregnancy due to AMA, initial twin pregnancy with twin loss at 6 weeks, insulin-dependent GDM, and subclinical hypothyroidism.  We discussed doing weekly BPP's and NSTs at [facility].  We will continue with weekly OB visits in addition.  GBS is done today.  We plan for induction on Friday, May 29 at 39 weeks.  Discussed that with vaginal discharge, it may be normal vaginal discharge just causing skin irritation and will do a trial of Vaseline externally.  A wet prep is currently pending.  2. Anticipatory guidance, warning signs, when to call MDs and contact info reviewed.   Follow up: 1 week for BPP and MD visit  Haily Shore MD

## 2021-06-07 NOTE — PROGRESS NOTES
"  Neha Mcginnis is a 34 y.o. female who is being evaluated via a billable telephone visit.      The patient has been notified of following:     \"This telephone visit will be conducted via a call between you and your physician/provider. We have found that certain health care needs can be provided without the need for a physical exam.  This service lets us provide the care you need with a short phone conversation.  If a prescription is necessary we can send it directly to your pharmacy.  If lab work is needed we can place an order for that and you can then stop by our lab to have the test done at a later time.    If during the course of the call the physician/provider feels a telephone visit is not appropriate, you will not be charged for this service.\"         Neha Mcginnis complains of    Chief Complaint   Patient presents with     Routine Prenatal Visit       I have reviewed and updated the patient's Past Medical History, Social History, Family History and Medication List.    ALLERGIES  Other allergy (see comments)    Additional provider notes:   Patient is a 34 y.o.  at 30w0d here for routine OB visit. Patient's OB course has thus far been complicated by gestational diabetes.     Subjective:   Specific concerns: f/u endocrinology    Morning sickness: no   Fatigue: no  Heartburn: yes  Cramping/contractions: no  Vaginal bleeding: no   Vaginal discharge: yes, normal for pregnancy; nothing irritating and nothing with an odor  Leaking of fluid: no   Urinary symptoms: no  GI symptoms: no  Fetal movement: yes  Taking prenatal vitamins: yes  Headaches: no  Visual changes: no  RUQ pain: no  Edema: no    Objective:  n/a    Assessment: 34 y.o.  at 30w0d here for routine OB visit.  Patient was unable to be seen secondary to endorsement of a cough, but she is overall doing well in treating with over-the-counter options.  She does not desire list of pregnancy approved cough suppressants.  Patient does endorse that the " vaginal discharge appears normal to her.  Patient has been feeling the baby move well and believes the fetus is growing.  She is pleased to have the insulin on board now and will meet with our diabetes educator as well to help with adjusting her regimen.  Plan:  Routine prenatal care  Discussed today: gestational diabetes and contacting the diabetic educator  Orders placed: none    nph 4units once daily with breakfast    Return to clinic in 2 week(s).    Phone call duration:  10 minutes    Wandy Nagy MD

## 2021-06-07 NOTE — PROGRESS NOTES
MIGDALIA  34w0d   Estimated Date of Delivery: 6/4/20    S: Prenatal record reviewed:  Doing overall well except for persistent vaginal itching and discomfort. Finishing coarse of metronidazole which she feel didn't help much      Constipation from the iron supplement- ok to take every other day. Advised not to use miralax, colace daily.      Hyperthyroidism TSH<0.04 plan to follow closely no treatment   GDM, following ENDO -currently NPH to 30 units in the morning before breakfast  - continue NPH 32 units before supper  FBS<100    A/P:   Neha was seen today for routine prenatal visit.    Diagnoses and all orders for this visit:    34 weeks gestation of pregnancy  -     US Biophysical Profile W Est Fetal Weight    Insulin dependent gestational diabetes mellitus (GDM), antepartum (H)  Will start weekly NST instead of biweekly and weekly or every other wk BPP/Growth/ EFW-       Fetal nonstress test    Vaginal discharge  Wet prep came back + for   -     Wet Prep, Vaginal    Hyperthyroidism  Comments:  following endo at [facility] last TSH suppressed at 0.04 most likely pregnancy related no treatment yet       COVID-19 counseling:   - recommend iron supplementation due to blood shortage  - avoid travel, social distancing + hand hygiene, avoid visitors at home  - current breastfeeding recommendations  - team based prenatal care by our [facility] group  - delivering physician may change  - visitor policy for imaging tests, office visits & L&D  - what to do if symptoms ([email or web address] or call the triage team at [phone])    Follow up:   Weekly till delivery as we have to do NST at the clinic , will schedule for BPP next wk     Kajal Sweet MD 4/23/2020 3:02 PM

## 2021-06-07 NOTE — TELEPHONE ENCOUNTER
Dr Rubio, pls call Juliette Kuhn Diabetes Educator from [facility]  - she got transferred to the Midwives by a Call Center staff member. Please call this Diabetes Educator at [phone] re plan of care for this pt. If you get Juliette's voice mail, please do leave your pager number. Juliette will be off tomorrow, pls call today.

## 2021-06-07 NOTE — TELEPHONE ENCOUNTER
Left message.  No need to call back.  Contact Dr. Zamorano's office with questions about blood sugars.

## 2021-06-08 NOTE — PROGRESS NOTES
[facility]  ENDOCRINOLOGY    Gestational Diabetes 2/17/2017    Neha Mcginnis, 1985, [id number]          Reason for visit      1. Insulin controlled gestational diabetes mellitus (GDM) in third trimester        HPI     Neha Mcginnis is a very pleasant 31 y.o. old female who presents for GESTATIONAL Diabetes Mellitus.  She is currently 32w5d pregnant G32P. Due date is 4/8/17, with induction planned on 3/30.  Diagnosed with GDM based on an OGTT. She hashad  GDM in prior pregnancies. Her first baby weighed 9 lb 2 oz and her second weighed 6 lbs, and was a week early.  She had a small placenta.  Current carbohydrate intake:consistent with recommendations of 30g-60g-60g.  I have reviewed her blood glucose logs and note that the:  Fasting readings  are:in range on current regimen  Postprandial readings are:above range on current regimen  Current NPH dose: 0  Current Prandial insulin: 6/6/6  Blood glucose logs/meter brought in and data reviewed and incorporated into decision-making.  Planned delivery at: [facility]  [facility]: [facility]    Therapy/Interventions in the past:  She has been seen by the Diabetes Educator- and has received instruction on carbohydrate counting and  consistency.  Records from referring provider and other sources have also been reviewed and incorporated into decision-making.      TODAY:    Neha is here with me for the first time.  She is feeling well and is not terribly happy about the insulin.  Her FBS look good, but her prandial numbers are high. We will make adjustments today.  Baby is moving and she is having no swelling.   has no concerns at present.      Past Medical History       Patient Active Problem List   Diagnosis     Pregnancy     Hyperthyroidism     Goiter     Pregnant        Past Surgical History     Past Surgical History:   Procedure Laterality Date     APPENDECTOMY         Family History     Family History   Problem Relation Age of Onset     No Medical Problems Mother        Social  "History     Social History   Substance Use Topics     Smoking status: Former Smoker     Smokeless tobacco: Never Used      Comment: quit 2006     Alcohol use No       Review of Systems     Patient has no polyuria or polydipsia, no chest pain, dyspnea or TIA's, no numbness, tingling or pain in extremities  Remainder negative except as noted in HPI.      Vital Signs     Visit Vitals     /68 (Patient Site: Right Arm, Patient Position: Sitting, Cuff Size: Adult Regular)     Pulse 80     Ht 5' 0.5\" (1.537 m)     Wt 171 lb (77.6 kg)     LMP 07/01/2016     BMI 32.85 kg/m2     Wt Readings from Last 3 Encounters:   02/16/17 171 lb (77.6 kg)   02/14/17 170 lb (77.1 kg)   02/07/17 169 lb 9.6 oz (76.9 kg)       Physical Exam     GENERAL: Pleasant, alert, appropriate appearance for age. No acute distress,   HEENT: Normocephalic, atraumatic  NECK: Supple, no masses or  lymph nodes.  THYROID: No nodules or enlargement. Non-tender, no bruit  CHEST/RESPIRATORY: Normal chest wall and respirations. Clear to auscultation.  CARDIOVASCULAR: Regular rate and rhythm. S1, S2, flow murmur, no click, gallop, or rubs.  ABDOMEN: Gravid   LYMPHATIC: No palpable nodes in neck, supraclavicular,  SKIN: No melanosis,  ecchymosis,  vitiligo. No acanthosis nigricans  NEURO:  Non-focal, normal DTRs; no tremor.  PSYCH: Alert and oriented -appropriate affect. Orientation, judgement and memory appear intact.  MSK: No joint abnormalities, FROM in all four extremities. No kyphosis    Assessment     1. Insulin controlled gestational diabetes mellitus (GDM) in third trimester        Plan     1. GESTATIONAL DIABETES-  Adjust dose as follows:    -NPH insulin0 units. Increase by 2 units every 2 days to keep fasting blood glucose below 95mg/dL  -Novolog 9  units with breakfast  -Novolog 5-6 units with lunch   -Novolog 7-9 units with dinner  -Increase by 2 units every 2 days to keep 1 hour after meal blood glucose less than 140mg/dL    We reviewed glucose " goals of fasting blood glucose <95 mg/dL and 1 hour post prandial blood glucose of <140 mg/dL.    Monitor blood sugar 4 times daily: Fasting  and 1 hour after each meal.  Contact  this clinic [phone] if blood glucose is not within the above-mentioned goals.     We discussed the importance of excellent glycemic control during pregnancy to limit complications such as fetal macrosomia, shoulder dystocia,  hypoglycemia and hyperbilirubinemia.  I have discussed the patient's increased risk of recurrent GDM and/or development of type 2 diabetes later in life.      Will see her once more before she delivers.        Sidra MCLEOD Bonita  2017      Lab Results     HEMOGLOBIN A1C   Date Value Ref Range Status   2016 5.0 3.5 - 6.0 % Final   2016 5.3 3.5 - 6.0 % Final   2014 5.5 4.2 - 6.1 % Final     CREATININE   Date Value Ref Range Status   2016 0.56 (L) 0.60 - 1.10 mg/dL Final   2015 0.61 0.60 - 1.10 mg/dL Final   2012 0.57 (L) 0.60 - 1.10 mg/dL Final       No results found for: CHOL, HDL, LDLCALC, TRIG    Lab Results   Component Value Date    ALT 20 2016    AST 16 2016    ALKPHOS 59 2016    BILITOT 0.3 2016         Current Medications     Outpatient Medications Prior to Visit   Medication Sig Dispense Refill     acetone, urine, test Strp Use 1 strip each morning to check ketones 100 strip 1     alcohol swabs (ALCOHOL PREP PADS) PadM Apply 1 each topically 4 (four) times a day. 400 each 2     blood glucose meter (GLUCOMETER) Use 1 each As Directed as needed. Dispense glucometer brand per patient's insurance at pharmacy discretion. 1 each 0     blood glucose test strips Use 1 each As Directed 4 (four) times a day. (Patient taking differently: Use 1 each As Directed 4 (four) times a day. One Touch Ultra) 100 strip 3     hydrocortisone (ANUSOL-HC) 2.5 % rectal cream Apply 1-2 times to affected area for no more than 1 week 30 g 0     insulin aspart  "(NOVOLOG FLEXPEN) 100 unit/mL injection pen Inject 2 Units under the skin 3 (three) times a day with meals. Take before meals with rice (Patient taking differently: Inject 6 Units under the skin 3 (three) times a day with meals. Take before meals with rice) 3 mL 0     lancets (ONETOUCH DELICA LANCETS) 33 gauge Misc 1 each by In Vitro route 4 (four) times a day. 100 each 3     pen needle, diabetic 32 gauge x 5/32\" Ndle Use 1 each As Directed daily. 100 each 1     prenatal vitamin#7-iron-FA-dha 28-1. mg cap Take 1 tablet by mouth daily. 90 each 3     loratadine (CLARITIN) 10 mg tablet Take 10 mg by mouth daily.       No facility-administered medications prior to visit.        "

## 2021-06-08 NOTE — ANESTHESIA POSTPROCEDURE EVALUATION
Patient: Neha Mcginnis  * No procedures listed *  Anesthesia type: epidural    Patient location: room  Last vitals: No vitals data found for the desired time range.    Post vital signs: stable  Level of consciousness: awake and responds to simple questions  Post-anesthesia pain: pain controlled  Post-anesthesia nausea and vomiting: no  Pulmonary: unassisted, return to baseline  Cardiovascular: stable and blood pressure at baseline  Hydration: adequate  Anesthetic events: no    QCDR Measures:  ASA# 11 - Anamika-op Cardiac Arrest: ASA11B - Patient did NOT experience unanticipated cardiac arrest  ASA# 12 - Anamika-op Mortality Rate: ASA12B - Patient did NOT die  ASA# 13 - PACU Re-Intubation Rate: ASA13B - Patient did NOT require a new airway mgmt  ASA# 10 - Composite Anes Safety: ASA10A - No serious adverse event    Additional Notes:

## 2021-06-08 NOTE — ANESTHESIA PROCEDURE NOTES
Epidural Block    Patient location during procedure: OB  Time Called: 5/26/2020 7:56 PM  Reason for Block:labor epidural  Staffing:  Performing  Anesthesiologist: Hardeep Dumont MD  Preanesthetic Checklist  Completed: patient identified, risks, benefits, and alternatives discussed, timeout performed, consent obtained, at patient's request, airway assessed, oxygen available, suction available, emergency drugs available and hand hygiene performed  Procedure  Patient position: left lateral decubitus  Prep: ChloraPrep  Patient monitoring: continuous pulse oximetry, heart rate and blood pressure  Approach: midline  Location: L3-L4  Injection technique: CHAIM saline  Number of Attempts:4  Needle  Needle type: Ran   Needle gauge: 18 G     Catheter in Space: 4  Assessment  Sensory level:  No complications

## 2021-06-08 NOTE — PROGRESS NOTES
[facility] GDM Care Plan      Assessment/Plan: Pt seen today for f/u. She brings in food log, BG log and meter.   FB, 85, 88, 88  Breakfast:   186 with 6 units  - 12 inch subway  175 with 5 units - pancakes and syrup  279 with 6 units - pancakes and syrup  155 with 2 units - rice     Lunch:   119  102 - 5 units   148 - 4 units    Dinner:   144 with 5 units and <1cup of rice  140 with 5 units  191 with 0 units - salad  124 with 5 units - about 1 cup of rice  190 with 5 units - potato, meat and veg    We discussed portions at length today. Will increase insulin to 9 with breakfast, 11 if pancakes. 5-6 units with lunch depending on size and 7-9 units with dinner depending on size. Pt verbalized understanding of this. She will call prior to f/u with any concerns.         Time: 30  Visit Type: pregnancy clinic   Provider: Silviano  Provider's Diagnosis (per referral form): Gestational (648.83)  OGTT: No results found for this or any previous visit.   Estimated Date of Delivery: 17 - planning on inducing on 3/30  Pregnancy #: 3  Previous GDM: Yes - used insulin   Medications:   Current Outpatient Prescriptions:      acetone, urine, test Strp, Use 1 strip each morning to check ketones, Disp: 100 strip, Rfl: 1     alcohol swabs (ALCOHOL PREP PADS) PadM, Apply 1 each topically 4 (four) times a day., Disp: 400 each, Rfl: 2     blood glucose meter (GLUCOMETER), Use 1 each As Directed as needed. Dispense glucometer brand per patient's insurance at pharmacy discretion., Disp: 1 each, Rfl: 0     blood glucose test strips, Use 1 each As Directed 4 (four) times a day., Disp: 100 strip, Rfl: 3     hydrocortisone (ANUSOL-HC) 2.5 % rectal cream, Apply 1-2 times to affected area for no more than 1 week, Disp: 30 g, Rfl: 0     insulin aspart (NOVOLOG FLEXPEN) 100 unit/mL injection pen, Inject 2 Units under the skin 3 (three) times a day with meals. Take before meals with rice, Disp: 3 mL, Rfl: 0     lancets (ONETOUCH DELICA LANCETS)  "33 gauge Misc, 1 each by In Vitro route 4 (four) times a day., Disp: 100 each, Rfl: 3     loratadine (CLARITIN) 10 mg tablet, Take 10 mg by mouth daily., Disp: , Rfl:      pen needle, diabetic 32 gauge x 5/32\" Ndle, Use 1 each As Directed daily., Disp: 100 each, Rfl: 1     prenatal vitamin#7-iron-FA-dha 28-1. mg cap, Take 1 tablet by mouth daily., Disp: 90 each, Rfl: 3      BG monitoring goals: Fasting <95; 1 hour post start of meal <140. Test 4 x per day.  Check fasting a.m. ketones: Yes  GDM meal pattern/carb counting taught per guidelines: Yes    Past Goals:  Nutrition: GDM meal plan MET  Activity: Walking after meals when able/staying active MET  Monitoring: BG 4x/day as directed, ketones every morning MET      New Goals:  Nutrition: GDM meal plan   Activity: Walking after meals when able/staying active   Monitoring: BG 4x/day as directed, ketones every morning    DIABETES CARE PLAN AND EDUCATION RECORD    Gestational Diabetes Disease Process/Preconception Care/Management During Pregnancy/Postpartum:Discussed    Meter (per above goals): Discussed    Nutrition Management    Weight: Assessed and Discussed  Portions/Balance: Assessed and Discussed  Carb ID/Count: Assessed and Discussed  Label Reading: Assessed and Discussed  Menu Planning: Assessed and Discussed  Dining Out: Assessed and Discussed  Physical Activity: Assessed and Discussed    Acute Complications: Prevent, Detect, Treat:    Goal Setting and Problem Solving: Assessed and Discussed  Barriers: Assessed and Discussed  Psychosocial Adjustments: Assessed and Discussed    I agree with the aforementioned diabetes plan.  Sidra NunezDay[facility]  2/16/2017  2:32 PM    "

## 2021-06-08 NOTE — PROGRESS NOTES
"MIGDALIA  37w0d  Estimated Date of Delivery: 20    Chief Complaint   Patient presents with     Routine Prenatal Visit     37 weeks, dealing with some swelling & would like a RX for heartburn       S: She had an ultrasound done at [facility] today for her BPP.  This was a preliminary result labeled  and she brings this report which additionally indicates fetal growth is 66 percentile and the anterior placenta seen with a posterior accessory lobe.  She did not have an NST done at [facility] because she thought she was going to have it done here today.    She is followed by endocrinology for insulin-dependent gestational diabetes and subclinical hypothyroidism.  She is currently taking NPH 40 units twice daily which was a recent increase from last week doing 35 units twice daily due to elevated blood glucose. Blood sugars continue to be above goal- she struggles because if she eats within 30min of the medication administration she still has higher post prandials, but if she waits 2hr before eating after med is administered then she feels lower/shaky from this.  She tells me 2 times she is taken an additional 20 units of NPH in the middle of the day due to \"high sugars \"in the 160s postprandial.  I asked if this was instructed by her endocrinologist and she said no but she wanted to cover for the high sugar.  I advised she follow-up with her prescribing physician for this.  She is planning to call the endocrinologist today to review sugars and plan again.     + fetal movement.   No vaginal bleeding, cramping, LOF.   No headaches, vision change, RUQ abdominal pain, LE swelling.       O: Vitals reviewed, see prenatal flowsheet for measurements.   appears well, no acute distress.  Normal respiratory effort.  Abdomen is soft. No LE swelling.     A/P:   35-year-old  female with high risk pregnancy due to AMA, initial twin pregnancy with twin loss at 6 weeks, insulin-dependent GDM- not adequately controlled, and " subclinical hypothyroidism. Following with endocrinology.   PCP has discussed doing weekly BPP's and NSTs at [facility]. BPP 8/8 today, NST not done there so we did this today, see procedure note to follow.  We will continue with weekly OB visits in addition.  GBS done 5/1 negative.  They plan for induction on Friday, May 29 at 39 weeks.  Discussed that with vaginal discharge, it may be normal vaginal discharge just causing skin irritation and will do a trial of Vaseline externally.  A wet prep was negative on 5/1.    - Anticipatory guidance, warning signs, when to call MDs and contact info reviewed.   - H2 blocker for heartburn prescribed  - plan to repeat thyroid tests 6-8 weeks after delivery  Per endocrine     Non Stress Test (NST)  Performed on 5/14/2020 at 37w0d.  Indication: insulin dependent GDM    Fetal Heart Rate Base Line: 140   Accelerations: present  Reactive: yes, several 15x15 accels  Decelerations: absent  Uterine Activity: no contractions  Interpretation: reactive NST      COVID-19 counseling:   - recommend iron supplementation due to blood shortage  - avoid travel, social distancing + hand hygiene, avoid visitors at home  - current breastfeeding recommendations  - team based prenatal care by our [facility] group  - delivering physician may change  - visitor policy for imaging tests, office visits & L&D  - what to do if symptoms ([email or web address] or call the triage team at [phone])    Follow up: next week with Dr. Pierre- weekly with BPP and NST at [facility] (or NST here if not done at [facility]).      Wandy Monte MD

## 2021-06-08 NOTE — PROGRESS NOTES
[facility]   [place]-Pelvic Initial Evaluation    Patient Name: Neha Mcginnis  Date of evaluation: 2/9/2017  Referral Diagnosis: Low back pain during pregnancy in third trimester  Referring provider: Haily Shore MD  Visit Diagnosis:     ICD-10-CM    1. Pregnancy with low back pain in third trimester, antepartum O26.893     M54.5    2. Muscle weakness (generalized) M62.81        Assessment:      Pt. is appropriate for skilled PT intervention as outlined in the Plan of Care (POC).    Goals:  Pt. will demonstrate/verbalize independence in self-management of condition in : 6 weeks  Patient will stand : 30 minutes;for home chores;with less difficultty;with less pain;in 6 weeks  Patient will decrease : FRANCHESCA score;for improved quality of function;in 6 weeks  Pt will: perform work tasks and housework tasks with pain <2/10; in 6 weeks     Patient's expectations/goals are realistic.    Barriers to Learning or Achieving Goals:  No Barriers.       Plan / Patient Instructions:        Plan of Care:   Communication with: Referral Source  Patient Related Instruction: Nature of Condition;Treatment plan and rationale;Self Care instruction;Basis of treatment;Body mechanics;Posture;Expected outcome  Times per Week: 2  Number of Weeks: 6  Number of Visits: 12 - PRN   Discharge Planning: Pt will be discharged upon meeting goals or plateau of progress   Therapeutic Exercise: ROM;Stretching;Strengthening  Neuromuscular Reeducation: kinesio tape;posture;core  Manual Therapy: soft tissue mobilization;myofascial release;joint mobilization;muscle energy;strain counterstrain  Modalities: cold pack;hot pack    Plan for next visit: Continue with there-ex and MT     Subjective:         History of Present Illness:    Neha is a 31 y.o. female who presents to therapy today with complaints of low back and buttocks pain associated with pregnancy. Date of onset/duration of symptoms is 2/1/17 Onset was gradual. Symptoms are intermittent.  She denies history of similar symptoms. She describes their previous level of function as not limited     Pt reports that her pain increases with increased activity.  Pt reports with working, cooking, cleaning etc. Pt reports that she goes to school and her back pack is heavy as is her work bag.  Pt works as an  30 hours per week.     Pt is currently 30 weeks pregnant.  This is her 3rd pregnancy.         Pain Ratin  Pain rating at best: 0  Pain rating at worst: 9  Pain description: aching and dull    Functional limitations are described as occurring with:   performing routine daily activities  cooking, doing the dishes    Standing     Patient reports benefit from:  rest  , heat         Objective:      Note: Items left blank indicates the item was not performed or not indicated at the time of the evaluation.    Patient Outcome Measures :    Modified Oswestry Low Back Pain Disablity Questionnaire  in %: 60   Scores range from 0-100%, where a score of 0% represents minimal pain and maximal function. The minimal clinically important difference is a score reduction of 12%.    Examination  1. Pregnancy with low back pain in third trimester, antepartum     2. Muscle weakness (generalized)       Involved side: Bilateral  Posture Observation:      General sitting posture is  fair.    Lumbar ROM:    Date: 17     *Indicate scale AROM AROM AROM   Lumbar Flexion Min loss, LBP/sore     Lumbar Extension Min loss, Left low back and buttocks pain       Right Left Right Left Right Left   Lumbar Sidebending No loss, no pain  No loss, no pain        Lumbar Rotation No loss, min sore R No loss, min sore R         Lower Extremity Strength:     Date: 17      LE strength/5 Right Left Right Left Right Left   Hip Flexion (L1-3) 4 4       Hip Extension (L5-S1) NT NT       Hip Abduction (L4-5) 4 4       Hip Adduction (L2-3) 4 4       Hip External Rotation 4+ 4+       Hip Internal Rotation 4+ 4+       Knee Extension (L3-4)  5 5       Knee Flexion 5 5       Ankle Dorsiflexion (L4-5) 5 5       Great Toe Extension (L5)         Ankle Plantar flexion (S1)         Abdominals Decreased due to pregnancy        Sensation : WNL  Reflex Testing: NT    Palpation:  Increased tenderness over B sacral borders   Increased gluteal and SI area muscle tightness and tenderness = B    Pelvis:  Pelvis appears level today in supine     Lumbar Special Tests:     Lumbar Special Tests Right Left SI Tests Right  Left   Quadrant test - - SI Compression + +   Straight leg raise - - SI Distraction + +   Crossover response - - POSH Test - +   Slump   Sacral Thrust + +   Sit-up test  FADIR - -   Trunk extensor endurance test NT Resisted Abduction     Prone instability test  Other:     Pubic shotgun  Other:       Repeated Motion Testing:  Does not centralize  Does not peripheralize    Passive Mobility - Joint Integrity:  Hypermobile    LE Screen:  Not indicated.    Treatment Today    TREATMENT MINUTES COMMENTS   Evaluation 25    Self-care/ Home management     Manual therapy 10 STM/MFR to bilateral lumbar/SI and gluteal muscles - lumbar distraction   Pt seated on ball - leaning forward on the table.    Neuromuscular Re-education     Therapeutic Activity     Therapeutic Exercises 20 Pathology, POC, HEP, etc   See flow sheet    Gait training     Modality__________________                Total 55    Blank areas are intentional and mean the treatment did not include these items.     PT Evaluation Code: (Please list factors)  Patient History/Comorbidities: Pregnancy   Examination: Hypermobility, weakness, tightness   Clinical Presentation: stable and uncomplicated   Clinical Decision Making: low     Patient History/  Comorbidities Examination  (body structures and functions, activity limitations, and/or participation restrictions) Clinical Presentation Clinical Decision Making (Complexity)   No documented Comorbidities or personal factors 1-2 Elements Stable and/or  uncomplicated Low   1-2 documented comorbidities or personal factor 3 Elements Evolving clinical presentation with changing characteristics Moderate   3-4 documented comorbidities or personal factors 4 or more Unstable and unpredictable High        Wandy Calzada  2/9/2017  11:36 AM

## 2021-06-08 NOTE — PROGRESS NOTES
[facility] GDM Care Plan    Assessment:  Neha is here for gestational diabetes follow up.  She has started taking 2 units Novolog insulin with each meal.  Her insurance did not cover the Milly meter so she has limited BG results.  She denies any problems with hypoglycemia.   She c/o hemorrhoids and her interest in getting more fruit in her diet.   Her last meal was 5.5 hours ago.    FBS:  83 mg/dl  post meals:  156 (pancakes), 133, 127, 189 (sticky rice)    Plan:  I will send prescription for One Touch Verio, this is covered by [employer].  Reviewed nutrition guidelines for gesational diabetes and asked that she keep  food records- this will help explain variation in BG levels.  She tends to skip snacks and she verbalizes her interested in eating fewer sweets.    Discussed higher fiber foods and rec to increase her water intake.  Rec to avoid pancakes/syrup and jelly at breakfast  and do her best to get 3 meals/3snacks.    Food recall:   BF:@ 9 am   eggs, sausage, pancakes (syrup),  no snack,   Lunch:  rice/veg/meat @ home    no snack      Dinner:  rice/eggs/         snack:  skips     Provider: Dr. Shore  Provider's Diagnosis (per referral form): Gestational (648.83)    Weight: 169 lb 9.6 oz (76.9 kg)  OGTT: No results found for this or any previous visit.  EDC: Estimated Date of Delivery: 4/8/17 (girl)   Dusty  Pregnancy #: 3  Previous GDM: Yes -- on insulin  NPH and mealtime  Medications:   Current Outpatient Prescriptions:      acetone, urine, test Strp, Use 1 strip each morning to check ketones, Disp: 100 strip, Rfl: 1     alcohol swabs (ALCOHOL PREP PADS) PadM, Apply 1 each topically 4 (four) times a day., Disp: 400 each, Rfl: 2     blood glucose test (CONTOUR NEXT STRIPS) strips, Use 1 each As Directed 4 (four) times a day., Disp: 100 strip, Rfl: 3     hydrocortisone (ANUSOL-HC) 2.5 % rectal cream, Apply 1-2 times to affected area for no more than 1 week, Disp: 30 g, Rfl: 0     insulin aspart (NOVOLOG FLEXPEN) 100  "unit/mL injection pen, Inject 2 Units under the skin 3 (three) times a day with meals. Take before meals with rice, Disp: 3 mL, Rfl: 0     lancets 25 gauge Misc, Use 1 Device As Directed 4 (four) times a day., Disp: 100 each, Rfl: 2     loratadine (CLARITIN) 10 mg tablet, Take 10 mg by mouth daily., Disp: , Rfl:      pen needle, diabetic 32 gauge x /32\" Ndle, Use 1 each As Directed daily., Disp: 100 each, Rfl: 1     prenatal vitamin#7-iron-FA-dha 28-1. mg cap, Take 1 tablet by mouth daily., Disp: 90 each, Rfl: 3    PNV: yes  B mg/dl  6 hours since breakfast.  BG monitoring goals: Fasting <95; 1 hour post start of meal <140. Test 4 x per day.  Check fasting a.m. ketones: Yes - negative  GDM meal pattern/carb counting taught per guidelines: Yes    DIABETES CARE PLAN AND EDUCATION RECORD    Gestational Diabetes Disease Process/Preconception Care/Management During Pregnancy/Postpartum:Discussed    Meter (per above goals): Competent - demoed One Touch Verio today    Nutrition Management    Weight: Assessed and Discussed  Portions/Balance: Assessed and discussed  Carb ID/Count: Assessed and discussed  Label Reading: Assessed and discussed  Menu Planning: Literature provided  Dining Out: Discussed and Literature provided  Physical Activity: Discussed  Medications: Discussed    Acute Complications: Prevent, Detect, Treat:    Hypoglycemia: Discussed, Literature provided and Patient returned demonstration  Hyperglycemia: Discussed  Goal Setting and Problem Solving: Discussed  Barriers: Discussed  Psychosocial Adjustments: Discussed      Time: 30  Visit Type: GDM Individual Follow-up  Visit #: 3              "

## 2021-06-08 NOTE — PROGRESS NOTES
Clinic Note - Return OB Visit    Neha Mcginnis is a 35 y.o.  female who presents to clinic for a follow up OB visit. She is currently 37w6d with an estimated date of delivery of 20 via  [facility]. She denies headache, chest pain, shortness of breath, abdominal pain/contractions, vaginal bleeding, or abnormal discharge. She has felt baby move.     New concerns today: none  -feels like baby is lower/feeling more pressure, occasional carla hernandez contractions  -still having discharge but asymptomatic  -having BPP[place] [facility] [place] [facility] today  -reports blood sugars continue to be high, insulin has been increasing weekly (currently taking NPH 52U two times a day), she reports she has been adjusting the timing of her meals in attempts to have glucose peak match with insulin, she is scheduled to send [employer] message to endo today to update them on recent blood sugars    OB History    Para Term  AB Living   4 3 3     3   SAB TAB Ectopic Multiple Live Births         0 3      # Outcome Date GA Lbr Basil/2nd Weight Sex Delivery Anes PTL Lv   4 Current            3 Term 17 38w5d 03:24 / 00:08 6 lb 5.6 oz (2.88 kg) F Vag-Spont EPI N DINA   2 Term 12/26/15 38w3d 06:20 / 00:15 6 lb 10.9 oz (3.03 kg) F Vag-Spont EPI N DINA   1 Term 14   9 lb 2 oz (4.139 kg) F Vag-Spont EPI N DINA      Birth Comments: induction, gestational DM       Physical exam:  /70   Pulse 88   Temp 98.1  F (36.7  C)   Wt 167 lb 5 oz (75.9 kg)   LMP 2019 (Approximate)   BMI 31.61 kg/m      General: alert, female in no acute distress  Abdomen: gravid uterus appropriate for gestation age, non-tender, FHTs 135, Leopold's maneuver reveals vertex positioning  Gyn: 1cm/70%/-3  Extremities: no edema    Assessment/Plan:  1. Supervision of high risk pregnancy in third trimester  35-year-old  female with high risk pregnancy due to AMA, initial twin pregnancy with twin loss at 6 weeks, insulin-dependent GDM- not adequately  controlled, and subclinical hypothyroidism.     2. Insulin dependent gestational diabetes mellitus (GDM), antepartum (H)  Inadequately controlled blood sugars, pt reports now taking NPH 52 units two times a day, following with endocrinology. [facility]. Discussed with Dr Shore that induction before 39 weeks is likely appropriate given uncontrolled diabetes; she will call pt to discuss.    3. Multigravida of advanced maternal age in third trimester  Will be 35 yrs at time of delivery. NIPT/carrier testing normal.    4. Subclinical hypothyroidism  Following with endocrinology.      GBS negative.  Reviewed signs/symptoms of labor and when to present to the hospital.  Induction planned 5/29/20 - discussed with Dr Shore that perhaps earlier induction is appropriate given uncontrolled diabetes    Follow up in 1 week for routine prenatal visit, sooner if labor symptoms.    Marli Pierre MD   Single

## 2021-06-08 NOTE — PROGRESS NOTES
Complains of low back pain radiating into bilateral legs with standing for too long- referral to PT  GDM- required insulin with previous pregnancies.  Now having at least 1 blood sugar elevated daily after meals, fasting BS are ok- meet with diabetes education for meal time insulin start  Good fetal movement and growth. Follow up in 2 weeks  Check TSH with hx of hyperthyroidism

## 2021-06-08 NOTE — PROGRESS NOTES
[facility] GDM Care Plan      Time: 30 minutes  Visit Type: GDM Individual Follow-up  Visit #: 3    Provider: Dr. ROYCE Shore  Provider's Diagnosis (per referral form): Gestational (648.83)    Weight: 169 lb 14.4 oz (77.1 kg)  OGTT: 1 hour-179  EDC: Estimated Date of Delivery: 4/8/17   Pregnancy #: 3  Previous GDM: Yes, NPH and meal time  Medications:   Current Outpatient Prescriptions:      acetone, urine, test Strp, Use 1 strip each morning to check ketones, Disp: 100 strip, Rfl: 1     alcohol swabs (ALCOHOL PREP PADS) PadM, Apply 1 each topically 4 (four) times a day., Disp: 400 each, Rfl: 2     blood glucose test (CONTOUR NEXT STRIPS) strips, Use 1 each As Directed 4 (four) times a day., Disp: 100 strip, Rfl: 3     hydrocortisone (ANUSOL-HC) 2.5 % rectal cream, Apply 1-2 times to affected area for no more than 1 week, Disp: 30 g, Rfl: 0     lancets 25 gauge Misc, Use 1 Device As Directed 4 (four) times a day., Disp: 100 each, Rfl: 2     loratadine (CLARITIN) 10 mg tablet, Take 10 mg by mouth daily., Disp: , Rfl:      prenatal vitamin#7-iron-FA-dha 28-1. mg cap, Take 1 tablet by mouth daily., Disp: 90 each, Rfl: 3  Supplements: No  PNV: Yes  BG: Neha comes with her meter only today.  Over the past month Neha has had some post meal readings that are above goal, highest reading is 219.  There is no desrenable pattern to her highs, she does not seem to be high at a specific meal.  Her fasting readings are all within target.  Some of her post meal readings are rather low, below 100.  We review goals as patient thought FBG goal was under 90 and post meal goal was under 130.  We decide to start meal time insulin today.  She will start taking 2 units before meals that she is going to have rice as according to patient this seems to be causing most of her highs.  Discussed if readings become consistently high she should take with each meal.  Patient understands.  She will be contacted to schedule in pregnancy clinic.  All  additional questions answered.  BG monitoring goals: Fasting <95; 1 hour post start of meal <140. Test 4 x per day.  Check fasting a.m. ketones: Yes  GDM meal pattern/carb counting taught per guidelines: Yes    DIABETES CARE PLAN AND EDUCATION RECORD    Gestational Diabetes Disease Process/Preconception Care/Management During Pregnancy/Postpartum:Assessed and Discussed    Meter (per above goals): Assessed and Discussed    Nutrition Management    Weight: Assessed and Discussed  Portions/Balance: Assessed and discussed  Carb ID/Count: Assessed and discussed  Label Reading: Assessed and discussed  Menu Planning: Assessed and Discussed  Dining Out: Assessed and Discussed  Physical Activity: Assessed and Discussed  Medications: Assessed, Discussed and Literature provided    Acute Complications: Prevent, Detect, Treat:    Hypoglycemia: Assessed and Discussed  Hyperglycemia: Assessed and Discussed  Goal Setting and Problem Solving: Assessed and Discussed  Barriers: Assessed and Discussed  Psychosocial Adjustments: Assessed and Discussed      I agree with the aforementioned diabetes plan.  Sidra MCLEOD Replaced by [facility]  2/2/2017  3:06 PM

## 2021-06-08 NOTE — PATIENT INSTRUCTIONS - HE
Phone Numbers:  [facility]: [phone]  [facility]: [phone]     When to call or come in to the hospital  If you notice a decrease in your baby's movement.   If your begin to experience contractions that are 5 minutes or less apart and lasting for over 45 seconds, or if contractions are becoming more painful.  If you have any bleeding or leakage of fluids.   If you have a headache not resolved with tylenol, right upper abdominal pain, or sudden onset of swelling.  You know your body best. Never hesitate to call or go to the hospital if something doesn't feel right!    Preparing for the hospital  You re likely feeling anxious as your child s birth approaches. This is normal. To give yourself some peace of mind, pack a bag 3-4 weeks before your due date. Here is a list of things to remember:  Personal care items like toothbrush, hair brush, lip balm, lotion, shampoo, glasses, contacts  Nightgown, bathrobe, slippers  Several pairs of underwear  Comfortable clothes for you to wear home  Camera with new batteries  Cell phone and   Insurance information and any other paperwork needed for your hospital stay  Clothes for baby to wear home  An infant, rear-facing car seat for bringing home your baby (this is required by law)

## 2021-06-08 NOTE — ANESTHESIA PREPROCEDURE EVALUATION
Anesthesia Evaluation      Patient summary reviewed   No history of anesthetic complications     Airway   Mallampati: I  Neck ROM: full   Pulmonary - negative ROS                          Cardiovascular - negative ROS   Neuro/Psych - negative ROS     Endo/Other    (+) diabetes mellitus well controlled, hypothyroidism, hyperthyroidism, pregnant     GI/Hepatic/Renal - negative ROS           Dental - normal exam                        Anesthesia Plan  Planned anesthetic: epidural    ASA 2     Anesthetic plan and risks discussed with: patient and spouse    Post-op plan: routine recovery

## 2021-06-08 NOTE — PROGRESS NOTES
Good fm, no headaches or edema. occ contractions  BS mildly elevated with meals- taking 5 units with most meals  FBS are normal

## 2021-06-09 NOTE — PROGRESS NOTES
"[facility] Daily Progress     Patient Name: Neha Mcginnis  Date: 2017  Visit #: 2  Referring provider: Haily Shore MD  Visit Diagnosis:     ICD-10-CM    1. Pregnancy with low back pain in third trimester, antepartum O26.893     M54.5    2. Muscle weakness (generalized) M62.81          Assessment:     HEP/POC compliance is  fair .  Patient demonstrates understanding/independence with home program.  Patient is benefitting from skilled physical therapy and is making steady progress toward functional goals.    Goal Status:  Pt. will demonstrate/verbalize independence in self-management of condition in : 6 weeks  Patient will stand : 30 minutes;for home chores;with less difficultty;with less pain;in 6 weeks  Patient will decrease : FRANCHESCA score;for improved quality of function;in 6 weeks  Pt will: perform work tasks and housework tasks with pain <2/10; in 6 weeks     Plan / Patient Education:     Continue with initial plan of care.  Progress with home program as tolerated.    Subjective:     Pain Ratin    Pt reports that overall she is feeling better with less back pain.  Pt reports that she occasionallt gets a pinching pain on the left side.      Pt reports that her exercises are going ok with some difficulty doing them as she increases size and weeks in her pregnancy.  Pt reports that she does the exercises 1-2x per week.      Pt reports that she is 33 weeks pregnant.      Objective:     Pt tolerates exercises with minimal discomfort and mod-major cueing for technique.      Pt has difficulty isolating her pelvis with her exercises and has decreased pelvis and LE strength.      Pt with min-mod lumbar muscle tightness and tenderness.      Current Exercises:  Exercise #1: treadmill warm-up   Comment #1: next time   Exercise #2: Quadruped Cat/cow   Comment #2: x10 - major cue  Exercise #3: LTR  Comment #3: Bx10   Exercise #4: Supine Bridge   Comment #4: Bx10 with 3\" hold  - cued to increase reps " "  Exercise #5: Supine Hip ABD/ER   Comment #5: orange Bx15  Exercise #6: Supine Hip ADD   Comment #6: x10 with 5\"   Exercise #7: standing SLR Hip flex, ABD, Ext           Treatment Today     TREATMENT MINUTES COMMENTS   Evaluation     Self-care/ Home management     Manual therapy 10 STM/MFR to bilateral lumbar/SI and gluteal muscles - lumbar distraction   Pt seated on ball - leaning forward on the table.    Neuromuscular Re-education     Therapeutic Activity     Therapeutic Exercises 17 See flow sheet    Gait training     Modality__________________                Total 27    Blank areas are intentional and mean the treatment did not include these items.       Wandy Calzada, PT   2/23/2017      "

## 2021-06-09 NOTE — PROGRESS NOTES
31-year-old  at 36 weeks and 3 days gestation.  She has gestational diabetes and blood sugars have been recently difficult to control.  She is currently taking 16 units of NovoLog insulin with her meals.  Her fasting blood sugars have been normal so she has not required any long-acting insulin.  She had 1 or 2 episodes of what she felt like she woke up with low blood sugars.  She was shaky and a little sweaty.  She's not been taking a snack at night.  I recommend that she eat small snack at night.  She will not take an ambulance on right now with that snack.  She'll continue taking her insulin with her meals.  Methimazole was added due to her history of hyperthyroidism by endocrinology.  Planning induction on 3-30 for difficult to control gestational diabetes on insulin.  We'll continue with weekly biophysical profiles and NSTs.

## 2021-06-09 NOTE — PROGRESS NOTES
[facility] Daily Progress     Patient Name: Neha Mcginnis  Date: 3/7/2017  Visit #: 4  Referring provider: Haily Shore MD  Visit Diagnosis:     ICD-10-CM    1. Pregnancy with low back pain in third trimester, antepartum O26.893     M54.5    2. Muscle weakness (generalized) M62.81          Assessment:     HEP/POC compliance is  fair .  Patient demonstrates understanding/independence with home program.  Patient is benefitting from skilled physical therapy and is making steady progress toward functional goals.    Pt notes decreased pain after PT.      Goal Status:  Pt. will demonstrate/verbalize independence in self-management of condition in : 6 weeks  Patient will stand : 30 minutes;for home chores;with less difficultty;with less pain;in 6 weeks  Patient will decrease : FRANCHESCA score;for improved quality of function;in 6 weeks  Pt will: perform work tasks and housework tasks with pain <2/10; in 6 weeks     Plan / Patient Education:     Continue with initial plan of care.  Progress with home program as tolerated.    Subjective:     Pain Rating: 3    Pt reports that overall her back has been some better with less aching and minimal pain.  However, pt reports that she has not been overdoing it with her cooking etc.      When the pain is present she feels it across her low back and buttocks muscles.     Pt reports that she does her exercises every other day.     Pt reports that she is 35 weeks pregnant.      Objective:     Pt tolerates exercises with no discomfort and minimal cueing for technique.      Pt has difficulty isolating her pelvis with her exercises and has decreased pelvis and LE strength.  Pt challenged for stability on the ball, but reports that it feels good on her back to be on the ball.     Pt with min-mod lumbar muscle tightness and tenderness.  Pt notes a significant decrease in pain after MT.      Current Exercises:  Exercise #1: treadmill warm-up   Comment #1: x4 min at 2.0 mph   Exercise  "#2: Quadruped Cat/cow   Comment #2: x10   Exercise #3: LTR  Comment #3: on ball - [id number]\", Circles CW and CCW x10 each   Exercise #4: Supine Bridge   Comment #4: ball - march Bx10, bow x10   Exercise #5: Supine Hip ABD/ER   Comment #5: orange   Exercise #6: Supine Hip ADD   Comment #6: x10 with 5\" while seated on the ball   Exercise #7: standing SLR Hip flex, ABD, Ext   Comment #7: Bx15 each           Treatment Today     TREATMENT MINUTES COMMENTS   Evaluation     Self-care/ Home management     Manual therapy 12 STM/MFR to bilateral lumbar/SI and gluteal muscles - lumbar distraction   Pt seated on ball - leaning forward on the table.    Neuromuscular Re-education     Therapeutic Activity     Therapeutic Exercises 15 See flow sheet    Gait training     Modality__________________                Total 27    Blank areas are intentional and mean the treatment did not include these items.       Wandy Calzada, PT   3/7/2017  "

## 2021-06-09 NOTE — PROGRESS NOTES
[facility]  ENDOCRINOLOGY    Gestational Diabetes 3/6/2017    Neha Mcginnis, 1985, [id number]          Reason for visit      1. Hyperthyroidism    2. Insulin controlled gestational diabetes mellitus (GDM) in third trimester        HPI     Neha Mcginnis is a very pleasant 31 y.o. old female who presents for GESTATIONAL Diabetes Mellitus. She is currently 35w pregnant . Due date is 17, with induction planned on 3/30. Diagnosed with GDM based on an OGTT. She hashad  GDM in prior pregnancies. Her first baby weighed 9 lb 2 oz and her second weighed 6 lbs, and was a week early. She had a small placenta.  Current carbohydrate intake:consistent with recommendations of 30g-60g-60g.  I have reviewed her blood glucose logs and note that the:  Fasting readings are:in range on current regimen  Postprandial readings are:above range on current regimen  Current NPH dose: 0  Current Prandial insulin: 14-16  Blood glucose logs/meter brought in and data reviewed and incorporated into decision-making.  [facility] [place]     Therapy/Interventions in the past:  She has been seen by the Diabetes Educator- and has received instruction on carbohydrate counting and  consistency.  Records from referring provider and other sources have also been reviewed and incorporated into decision-making.      TODAY:  Neha is here today in f/u for GDM and hyperthyroidism.  She missed her last appointment.  She has been titrating appropriately and is now taking 14-16 units with meals.  She is concerned that this baby is going to be small, although her last baby was only 6 lbs.  Her TSH has been low throughout this pregnancy, although she has had a low number before during pregnancy without any concerns.  All babies are compared to her first one, who weighed 9 lbs and was really too big for her.  Her OB has not expressed any concerns.  Her FBS are all in range.    Past Medical History       Patient Active Problem List  "  Diagnosis     Pregnancy     Hyperthyroidism     Goiter     Pregnant        Past Surgical History     Past Surgical History:   Procedure Laterality Date     APPENDECTOMY         Family History     Family History   Problem Relation Age of Onset     No Medical Problems Mother        Social History     Social History   Substance Use Topics     Smoking status: Former Smoker     Smokeless tobacco: Never Used      Comment: quit 2006     Alcohol use No       Review of Systems     Patient has no polyuria or polydipsia, no chest pain, dyspnea or TIA's, no numbness, tingling or pain in extremities  Remainder negative except as noted in HPI.      Vital Signs     Visit Vitals     /80 (Patient Site: Right Arm, Patient Position: Sitting, Cuff Size: Adult Regular)     Pulse 80     Ht 5' 0.5\" (1.537 m)     Wt 173 lb 8 oz (78.7 kg)     LMP 07/01/2016     BMI 33.33 kg/m2     Wt Readings from Last 3 Encounters:   03/03/17 173 lb 8 oz (78.7 kg)   02/28/17 173 lb (78.5 kg)   02/16/17 171 lb (77.6 kg)       Physical Exam     GENERAL: Pleasant, alert, appropriate appearance for age. No acute distress,   HEENT: Normocephalic, atraumatic  NECK: Supple, no masses or  lymph nodes.  THYROID: No nodules or enlargement. Non-tender, no bruit  CHEST/RESPIRATORY: Normal chest wall and respirations. Clear to auscultation.  CARDIOVASCULAR: Regular rate and rhythm. S1, S2, no murmur, click, gallop, or rubs.  ABDOMEN: Gravid   LYMPHATIC: No palpable nodes in neck, supraclavicular,  SKIN: No melanosis,  ecchymosis,  vitiligo. No acanthosis nigricans  NEURO:  Non-focal, normal DTRs; no tremor.  PSYCH: Alert and oriented -appropriate affect. Orientation, judgement and memory appear intact.  MSK: No joint abnormalities, FROM in all four extremities. No kyphosis    Assessment     1. Hyperthyroidism    2. Insulin controlled gestational diabetes mellitus (GDM) in third trimester        Plan     1. GESTATIONAL DIABETES-  Adjust dose as follows:    -NPH " insulin0   units. Increase by 2 units every 2 days to keep fasting blood glucose below 95mg/dL  -Novolog 14-16  units with breakfast  -Novolog 14-16 units with lunch   -Novolog 14-16 units with dinner  -Increase by 2 units every 2 days to keep 1 hour after meal blood glucose less than 140mg/dL    We reviewed glucose goals of fasting blood glucose <95 mg/dL and 1 hour post prandial blood glucose of <140 mg/dL.    Monitor blood sugar 4 times daily: Fasting  and 1 hour after each meal.  Contact  this clinic [phone] if blood glucose is not within the above-mentioned goals.     We discussed the importance of excellent glycemic control during pregnancy to limit complications such as fetal macrosomia, shoulder dystocia,  hypoglycemia and hyperbilirubinemia.  I have discussed the patient's increased risk of recurrent GDM and/or development of type 2 diabetes later in life.        Will start her on 5 mg of Methimazole daily until her next appointment.      Sidra Mesa  3/6/2017      Lab Results     HEMOGLOBIN A1C   Date Value Ref Range Status   2016 5.0 3.5 - 6.0 % Final   2016 5.3 3.5 - 6.0 % Final   2014 5.5 4.2 - 6.1 % Final     CREATININE   Date Value Ref Range Status   2016 0.56 (L) 0.60 - 1.10 mg/dL Final   2015 0.61 0.60 - 1.10 mg/dL Final   2012 0.57 (L) 0.60 - 1.10 mg/dL Final       No results found for: CHOL, HDL, LDLCALC, TRIG    Lab Results   Component Value Date    ALT 20 2016    AST 16 2016    ALKPHOS 59 2016    BILITOT 0.3 2016         Current Medications     Outpatient Medications Prior to Visit   Medication Sig Dispense Refill     acetone, urine, test Strp Use 1 strip each morning to check ketones 100 strip 1     alcohol swabs (ALCOHOL PREP PADS) PadM Apply 1 each topically 4 (four) times a day. 400 each 2     blood glucose meter (GLUCOMETER) Use 1 each As Directed as needed. Dispense glucometer brand per patient's insurance at  "pharmacy discretion. 1 each 0     blood glucose test strips Use 1 each As Directed 4 (four) times a day. One Touch Ultra 100 strip 3     docusate sodium (COLACE) 100 MG capsule Take 100 mg by mouth 2 (two) times a day.       hydrocortisone (ANUSOL-HC) 2.5 % rectal cream Apply 1-2 times to affected area for no more than 1 week 30 g 0     insulin aspart (NOVOLOG FLEXPEN) 100 unit/mL injection pen Inject 6 Units under the skin 3 (three) times a day with meals. Take before meals with rice (Patient taking differently: Inject 16 Units under the skin 3 (three) times a day with meals. Take before meals with rice) 6 mL 0     lancets (ONETOUCH DELICA LANCETS) 33 gauge Misc 1 each by In Vitro route 4 (four) times a day. 100 each 3     pen needle, diabetic 32 gauge x 5/32\" Ndle Use 1 each As Directed daily. 100 each 1     prenatal vitamin#7-iron-FA-dha 28-1. mg cap Take 1 tablet by mouth daily. 90 each 3     No facility-administered medications prior to visit.        "

## 2021-06-09 NOTE — PROGRESS NOTES
Assessment/Plan:     1. Fatigue, unspecified type  Ferritin    Iron and Transferrin Iron Binding Capacity    HM2(CBC w/o Differential)   2. Snoring  Ambulatory referral to Sleep Medicine   3. Anemia, unspecified type         Diagnoses and all orders for this visit:    Fatigue, unspecified type  -     Ferritin  -     Iron and Transferrin Iron Binding Capacity  -     HM2(CBC w/o Differential)  -She does have history of anemia.  She had stopped taking her iron supplements and developed increased symptoms of fatigue.  Encouraged her to continue her iron supplements and prenatal vitamins and we will recheck her ferritin, iron and hemogram today.  We will contact her with further recommendations when results are available.  A1c and thyroid studies also ordered by PCP.  These will be drawn today as well    Snoring  -     Ambulatory referral to Sleep Medicine  Discussed that snoring can be a symptom of sleep apnea.  Discussed that fatigue can also be a symptom of sleep apnea.  We will place referral to sleep medicine for further evaluation of possible sleep apnea-    Anemia, unspecified type  Continue iron supplement.  Check iron studies, ferritin and hemogram.  Further recommendations will be made once results are available.           Subjective:      Neha Mcginnis is a 35 y.o. female who comes in today with concern about fatigue for the past 3 to 4 days.  She is approximately 6 weeks postpartum.  States that she had stopped taking her iron supplement because she was feeling good.  Continued on her prenatal vitamin.  She restarted her iron supplement when she started feeling more fatigued and she is feeling a little bit better but she is still feeling tired.  She is interested in getting her iron levels checked because she is not improving as quickly as she would expect.  States that she feels very sleepy.  States that she does wake up at night from snoring.  She has not been told that she stops breathing at night.  She  has no family history of sleep apnea.  States that she started snoring and waking up from snoring during her pregnancy and thought it was related to that but it has not improved since she gave birth.  She is otherwise feeling well.  Denies fevers and chills.  No shortness of breath.  No dizziness or lightheadedness.  No change in taste or smell.  There are lab orders in her chart from her primary care physician to check her thyroid as well as her A1c.  She has no additional concerns or questions.  Review of systems is otherwise negative.    Current Outpatient Medications   Medication Sig Dispense Refill     acetaminophen (TYLENOL) 325 MG tablet Take 1-2 tablets (325-650 mg total) by mouth every 4 (four) hours as needed.  0     cetirizine (ZYRTEC) 10 MG tablet Take 1 tablet (10 mg total) by mouth 2 (two) times a day as needed for allergies. 90 tablet 3     docusate sodium (COLACE) 100 MG capsule Take 1 capsule (100 mg total) by mouth 2 (two) times a day. 60 capsule 3     famotidine (PEPCID) 20 MG tablet Take 1 tablet (20 mg total) by mouth 2 (two) times a day. 60 tablet 1     fluticasone propionate (FLONASE) 50 mcg/actuation nasal spray 1 spray into each nostril 2 (two) times a day. 16 g 3     hydrocortisone 2.5 % cream APPLY EXTERNALLY TO THE AFFTECTED AREA TWICE DAILY as needed 28 g 2     ibuprofen (ADVIL,MOTRIN) 800 MG tablet Take 1 tablet (800 mg total) by mouth every 8 (eight) hours. 30 tablet 0     cholecalciferol, vitamin D3, (VITAMIN D3 ORAL) Take 5,000 Units by mouth.       No current facility-administered medications for this visit.        Past Medical History, Family History, and Social History reviewed.  Past Medical History:   Diagnosis Date     Disease of thyroid gland     hypothyroid     Gestational diabetes     on insulin     Skin cancer 1988     Past Surgical History:   Procedure Laterality Date     APPENDECTOMY       SKIN CANCER EXCISION       Other allergy (see comments) and Dust [other  environmental allergy]  Family History   Problem Relation Age of Onset     No Medical Problems Mother      Social History     Socioeconomic History     Marital status: Single     Spouse name: Not on file     Number of children: Not on file     Years of education: Not on file     Highest education level: Not on file   Occupational History     Not on file   Social Needs     Financial resource strain: Not on file     Food insecurity     Worry: Not on file     Inability: Not on file     Transportation needs     Medical: Not on file     Non-medical: Not on file   Tobacco Use     Smoking status: Former Smoker     Smokeless tobacco: Never Used     Tobacco comment: quit 2006   Substance and Sexual Activity     Alcohol use: No     Drug use: No     Sexual activity: Yes     Partners: Male   Lifestyle     Physical activity     Days per week: Not on file     Minutes per session: Not on file     Stress: Not on file   Relationships     Social connections     Talks on phone: Not on file     Gets together: Not on file     Attends Rastafarian service: Not on file     Active member of club or organization: Not on file     Attends meetings of clubs or organizations: Not on file     Relationship status: Not on file     Intimate partner violence     Fear of current or ex partner: Not on file     Emotionally abused: Not on file     Physically abused: Not on file     Forced sexual activity: Not on file   Other Topics Concern     Not on file   Social History Narrative     lives with 3 kids 9 month 2yr and 3 yr old     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    Objective:     Vitals:    07/10/20 1436   BP: 127/90   Patient Site: Left Arm   Patient Position: Sitting   Cuff Size: Adult Regular   Pulse: 65   Temp: 98.1  F (36.7  C)   TempSrc: Oral   SpO2:  96%   Weight: 163 lb 4 oz (74 kg)    Body mass index is 30.85 kg/m .    General appearance: alert, appears stated age and cooperative  Head: Normocephalic, without obvious abnormality, atraumatic  Lungs: clear to auscultation bilaterally  Heart: regular rate and rhythm, S1, S2 normal, no murmur, click, rub or gallop  Extremities: extremities normal, atraumatic, no cyanosis or edema          This note has been dictated using voice recognition software. Any grammatical or context distortions are unintentional and inherent to the the software.

## 2021-06-09 NOTE — ANESTHESIA PROCEDURE NOTES
Epidural Block    Patient location during procedure: OB  Time Called: 3/30/2017 1:58 PM  Reason for Block:labor epidural  Staffing:  Performing  Anesthesiologist: MANINDER TOBIN  Preanesthetic Checklist  Completed: patient identified, risks, benefits, and alternatives discussed, timeout performed, consent obtained, at patient's request, airway assessed, oxygen available, suction available, emergency drugs available and hand hygiene performed  Procedure  Patient position: sitting  Prep: ChloraPrep  Patient monitoring: continuous pulse oximetry, heart rate and blood pressure  Approach: midline  Location: L3-L4  Injection technique: CHAIM saline  Number of Attempts:1  Needle  Needle type: Bizak   Needle gauge: 18 G     Catheter in Space: 4  Assessment  Sensory level:  No complications      Additional Notes:  Chart reviewed. Patient examined.   Procedure and its risks, including, but not limited to headache, nerve damage, bleeding, infection, back pain, low blood pressure, cardiorespiratory arrest, and block failure were discussed with patient.   Opportunity for questions given.   Informed consent obtained. She desires to proceed with this elective procedure.   Strict sterile technique was used throughout, including hand foam, Chloroprep, sterile drape, sterile gloves, mask, and hat.    Skin and subcutaneous tissue was infiltrated with 2 ml of 1% lidocaine.      Maninder Tobin MD

## 2021-06-09 NOTE — PROGRESS NOTES
[facility] Daily Progress     Patient Name: Neha Mcginnis  Date: 2017  Visit #: 3  Referring provider: Haily Shore MD  Visit Diagnosis:     ICD-10-CM    1. Pregnancy with low back pain in third trimester, antepartum O26.893     M54.5    2. Muscle weakness (generalized) M62.81          Assessment:     HEP/POC compliance is  fair .  Patient demonstrates understanding/independence with home program.  Patient is benefitting from skilled physical therapy and is making steady progress toward functional goals.    Pt notes decreased pain after PT.      Goal Status:  Pt. will demonstrate/verbalize independence in self-management of condition in : 6 weeks  Patient will stand : 30 minutes;for home chores;with less difficultty;with less pain;in 6 weeks  Patient will decrease : FRANCHESCA score;for improved quality of function;in 6 weeks  Pt will: perform work tasks and housework tasks with pain <2/10; in 6 weeks     Plan / Patient Education:     Continue with initial plan of care.  Progress with home program as tolerated.    Subjective:     Pain Ratin-5    Pt reports that overall she is feeling better with less back pain.  Pt reports that her back still has pain that increases with increased activity such as cooking.  Pt reports difficulty also with sitting ot standing too long.      Pt reports that she does her exercises every other day.     Pt reports that she is 34 weeks pregnant.      Objective:     Pt tolerates exercises with no discomfort and minimal cueing for technique.      Pt has difficulty isolating her pelvis with her exercises and has decreased pelvis and LE strength.  Pt challenged for stability on the ball, but reports that it feels good on her back to be on the ball.     Pt with min-mod lumbar muscle tightness and tenderness.      Current Exercises:  Exercise #1: treadmill warm-up   Comment #1: next time   Exercise #2: Quadruped Cat/cow   Comment #2: x10 - major cue  Exercise #3: LTR  Comment  "#3: Bx10   Exercise #4: Supine Bridge   Comment #4: Bx10 with 3\" hold  - cued to increase reps   Exercise #5: Supine Hip ABD/ER   Comment #5: orange Bx15  Exercise #6: Supine Hip ADD   Comment #6: x10 with 5\"   Exercise #7: standing SLR Hip flex, ABD, Ext   Comment #7: reviewed           Treatment Today     TREATMENT MINUTES COMMENTS   Evaluation     Self-care/ Home management     Manual therapy 10 STM/MFR to bilateral lumbar/SI and gluteal muscles - lumbar distraction   Pt seated on ball - leaning forward on the table.    Neuromuscular Re-education     Therapeutic Activity     Therapeutic Exercises 17 See flow sheet    Gait training     Modality__________________                Total 27    Blank areas are intentional and mean the treatment did not include these items.       Wandy Calzada, PT   2/28/2017    "

## 2021-06-09 NOTE — PROGRESS NOTES
31-year-old  at 37 weeks and 3 days gestation.  Blood sugars have been a little bit better control.  She continues on 16 units of NovoLog With Her Meals.  Her Fasting Blood Sugars Have Been Normal so She Has Not Been Taking Any Long Acting Insulin.  She Has Not Had Further Episodes of Low Blood Sugars.  She is getting weekly biophysical profiles and NSTs which have been normal showing an estimated fetal weight at about 60%.  The growth pattern has been normal.  We are still planning on induction on  when she is 38 weeks and 5 days gestation.  We will do Cervidil the night before as her cervix as of yet is not favorable.  We discussed options including Cytotec however she feels that the Cervidil worked well when she had last time so should like to continue with that option.  She will be seen on Tuesday of next week to assure there is no significant cervical change prior to the induction.

## 2021-06-09 NOTE — PROGRESS NOTES
[facility]  ENDOCRINOLOGY    Gestational Diabetes 3/23/2017    Neha Mcginnis, 1985, [id number]          Reason for visit      1. Insulin controlled gestational diabetes mellitus (GDM) during pregnancy, antepartum    2. Hyperthyroidism    3. Goiter        HPI     Neha Mcginnis is a very pleasant 31 y.o. old female who presents for GESTATIONAL Diabetes Mellitus. She is currently 36w pregnant . Due date is 17, with induction planned on 3/30. Diagnosed with GDM based on an OGTT. She hashad GDM in prior pregnancies. Her first baby weighed 9 lb 2 oz and her second weighed 6 lbs, and was a week early. She had a small placenta.  Current carbohydrate intake:consistent with recommendations of 30g-60g-60g.  I have reviewed her blood glucose logs and note that the:  Fasting readings are:in range on current regimen  Postprandial readings are:above range on current regimen  Current NPH dose: 0  Current Prandial insulin: 16-20  Blood glucose logs/meter brought in and data reviewed and incorporated into decision-making.  Planned delivery at: [facility]  OBGYN: Silviano    Therapy/Interventions in the past:  She has been seen by the Diabetes Educator- and has received instruction on carbohydrate counting and  consistency.  Records from referring provider and other sources have also been reviewed and incorporated into decision-making.      TODAY:  Neha returns today with the news that her baby has gained almost two lbs in the last two weeks, so we are glad that we got the Methimazole started.  She is feeling well, but notes that her FBS have been higher in the morning.  Baby is moving well.  She reports one low in the middle of the night and one while she was in the midst of an NST at the hospital, and then one more while at work, when it was mealtime.   has no concerns and she is having no swelling.    Past Medical History       Patient Active Problem List   Diagnosis     Pregnancy     Hyperthyroidism     Goiter     Pregnant         Past Surgical History     Past Surgical History:   Procedure Laterality Date     APPENDECTOMY         Family History     Family History   Problem Relation Age of Onset     No Medical Problems Mother        Social History     Social History   Substance Use Topics     Smoking status: Former Smoker     Smokeless tobacco: Never Used      Comment: quit 2006     Alcohol use No       Review of Systems     Patient has no polyuria or polydipsia, no chest pain, dyspnea or TIA's, no numbness, tingling or pain in extremities  Remainder negative except as noted in HPI.      Vital Signs     /74  Pulse 80  Wt 176 lb (79.8 kg)  LMP 07/01/2016  BMI 33.81 kg/m2  Wt Readings from Last 3 Encounters:   03/21/17 178 lb (80.7 kg)   03/17/17 176 lb (79.8 kg)   03/14/17 174 lb (78.9 kg)       Physical Exam     GENERAL: Pleasant, alert, appropriate appearance for age. No acute distress,   HEENT: Normocephalic, atraumatic  NECK: Supple, no masses or  lymph nodes.  THYROID: No nodules or enlargement. Non-tender, no bruit  CHEST/RESPIRATORY: Normal chest wall and respirations. Clear to auscultation.  CARDIOVASCULAR: Regular rate and rhythm. S1, S2, no murmur, click, gallop, or rubs.  ABDOMEN: Gravid   LYMPHATIC: No palpable nodes in neck, supraclavicular,  SKIN: No melanosis,  ecchymosis,  vitiligo. No acanthosis nigricans  NEURO:  Non-focal, normal DTRs; no tremor.  PSYCH: Alert and oriented -appropriate affect. Orientation, judgement and memory appear intact.  MSK: No joint abnormalities, FROM in all four extremities. No kyphosis    Assessment     1. Insulin controlled gestational diabetes mellitus (GDM) during pregnancy, antepartum    2. Hyperthyroidism    3. Goiter        Plan     1. GESTATIONAL DIABETES-  Adjust dose as follows:    -NPH insulin0   units. Increase by 2 units every 2 days to keep fasting blood glucose below 95mg/dL  -Novolog 16-20  units with breakfast  -Novolog 16-20 units with lunch   -Novolog 16-20 units  with dinner  -Increase by 2 units every 2 days to keep 1 hour after meal blood glucose less than 140mg/dL    We reviewed glucose goals of fasting blood glucose <95 mg/dL and 1 hour post prandial blood glucose of <140 mg/dL.    Monitor blood sugar 4 times daily: Fasting  and 1 hour after each meal.  Contact  this clinic [phone] if blood glucose is not within the above-mentioned goals.     We discussed the importance of excellent glycemic control during pregnancy to limit complications such as fetal macrosomia, shoulder dystocia,  hypoglycemia and hyperbilirubinemia.  I have discussed the patient's increased risk of recurrent GDM and/or development of type 2 diabetes later in life.        Pt given instructions regarding postpartum visit.      Sidra Mesa  3/23/2017      Lab Results     Hemoglobin A1c   Date Value Ref Range Status   2016 5.0 3.5 - 6.0 % Final   2016 5.3 3.5 - 6.0 % Final   2014 5.5 4.2 - 6.1 % Final     Creatinine   Date Value Ref Range Status   2016 0.56 (L) 0.60 - 1.10 mg/dL Final   2015 0.61 0.60 - 1.10 mg/dL Final   2012 0.57 (L) 0.60 - 1.10 mg/dL Final       No results found for: CHOL, HDL, LDLCALC, TRIG    Lab Results   Component Value Date    ALT 20 2016    AST 16 2016    ALKPHOS 59 2016    BILITOT 0.3 2016         Current Medications     Outpatient Medications Prior to Visit   Medication Sig Dispense Refill     acetone, urine, test Strp Use 1 strip each morning to check ketones 100 strip 1     alcohol swabs (ALCOHOL PREP PADS) PadM Apply 1 each topically 4 (four) times a day. 400 each 2     blood glucose meter (GLUCOMETER) Use 1 each As Directed as needed. Dispense glucometer brand per patient's insurance at pharmacy discretion. 1 each 0     blood glucose test strips Use 1 each As Directed 4 (four) times a day. One Touch Ultra 100 strip 3     docusate sodium (COLACE) 100 MG capsule Take 100 mg by mouth 2 (two) times a  "day.       hydrocortisone (ANUSOL-HC) 2.5 % rectal cream Apply 1-2 times to affected area for no more than 1 week 30 g 0     lancets (ONETOUCH DELICA LANCETS) 33 gauge Misc 1 each by In Vitro route 4 (four) times a day. 100 each 3     methIMAzole (TAPAZOLE) 5 MG tablet Take 1 tablet (5 mg total) by mouth daily. 30 tablet 5     pen needle, diabetic 32 gauge x 5/32\" Ndle Use 1 each As Directed daily. 100 each 1     prenatal vitamin#7-iron-FA-dha 28-1. mg cap Take 1 tablet by mouth daily. 90 each 3     insulin aspart (NOVOLOG FLEXPEN) 100 unit/mL injection pen Inject 16 Units under the skin 3 (three) times a day with meals. Take before meals with rice 6 mL 3     No facility-administered medications prior to visit.          "

## 2021-06-09 NOTE — TELEPHONE ENCOUNTER
Who is calling:  Patient  Reason for Call:  Patient is calling for a Post Partum appt. Please contact patient for indicating whether it be in office or a VV for her Post Partum appt.   Date of last appointment with primary care: NA  Okay to leave a detailed message: Yes

## 2021-06-09 NOTE — PROGRESS NOTES
[facility] Discharge Summary    Patient Name: Neha Mcginnis  Date: 3/21/2017  Referring provider: Haily Shore MD  Visit Diagnosis:   1. Pregnancy with low back pain in third trimester, antepartum     2. Muscle weakness (generalized)         Goals:  Pt. will demonstrate/verbalize independence in self-management of condition in : 6 weeks;Met  Patient will stand : 30 minutes;for home chores;with less difficultty;with less pain;in 6 weeks;Met  Patient will decrease : FRANCHESCA score;for improved quality of function;in 6 weeks;Progressing toward  Pt will: perform work tasks and housework tasks with pain <2/10; in 6 weeks; Met     Patient was seen for 4 visits.  Pt reported a few days after her 4th visit that she was doing well and no longer required further PT visits.  Pt goals were mostly met and she will continue with her HEP exercises.      Therapy will be discontinued at this time.  The patient will need a new referral to resume.    Thank you for your referral.  Wandy Calzada  3/21/2017  8:49 AM

## 2021-06-09 NOTE — PROGRESS NOTES
Assessment:        Neha Mcginnis is a 35 y.o. female here for postpartum exam at 8 weeks postpartum. Pap smear done at today's visit.   1. Postpartum care and examination  Gynecologic Cytology (PAP Smear)   2. Fatigue, unspecified type  Vitamin B12    Thyroid Cascade   3. Hyperthyroidism     4. Gestational diabetes mellitus (GDM), antepartum, gestational diabetes method of control unspecified  Glycosylated Hemoglobin A1c   5. Urinary frequency  oxybutynin (DITROPAN XL) 10 MG ER tablet   6. Female stress incontinence  oxybutynin (DITROPAN XL) 10 MG ER tablet   7. Vaginal discharge  Chlamydia trachomatis & Neisseria gonorrhoeae, Amplified Detection    Wet Prep, Vaginal   8. Routine postpartum follow-up     . .    Plan:        1. Thyroid and B12 levels pending- pursue sleep study if normal, hgb A1C with history of GDM. Renewed oxybutynin to start after finished breastfeeding  2. Contraception: none  3. No follow-ups on file.      Subjective:       Neha Mcginnis is a 35 y.o. female who presents for a postpartum visit. She is 8 weeks postpartum following a spontaneous vaginal delivery. I have fully reviewed the prenatal and intrapartum course. The delivery was at 38w5d gestational weeks. Outcome: spontaneous vaginal delivery. Postpartum course has been uncomplicated. Baby's course has been uncomplicated. Baby is feeding by both breast and bottle - unsure. Bled for  3 weeks postpartum.  Currently bleeding  no bleeding. Bowel function is normal. Bladder function is abnormal: urinary leakage- preexisting. Patient has not resumed intercourse. Contraception method is none. Postpartum depression screening score: 1    The following portions of the patient's history were reviewed and updated as appropriate: allergies, current medications and problem list    Review of Systems  General:  Denies problem  Eyes: Denies problem  Ears/Nose/Throat: Denies problem  Cardiovascular: Denies problem  Respiratory:  Denies problem  Gastrointestinal:  " Denies problem, Genitourinary: Denies problem  Musculoskeletal:  Denies problem  Skin: Denies problem  Neurologic: Denies problem  Psychiatric: Denies problem  Endocrine: Denies problem  Heme/Lymphatic: Denies problem   Allergic/Immunologic: Denies problem          Objective:         Vitals:    07/13/20 1348   BP: 120/80   Pulse: 62   SpO2: 97%   Weight: 162 lb 9.6 oz (73.8 kg)   Height: 5' 0.83\" (1.545 m)       Physical Exam:  General Appearance: Alert, cooperative, no distress, appears stated age  Head: Normocephalic, without obvious abnormality, atraumatic  Eyes: PERRL, conjunctiva/corneas clear, EOM's intact  Ears: Normal TM's and external ear canals, both ears  Nose: Nares normal, septum midline,mucosa normal, no drainage  Throat: Lips, mucosa, and tongue normal; teeth and gums normal  Neck: Supple, symmetrical, trachea midline, no adenopathy;  thyroid: not enlarged, symmetric, no tenderness/mass/nodules; no carotid bruit or JVD  Back: Symmetric, no curvature, ROM normal, no CVA tenderness  Lungs: Clear to auscultation bilaterally, respirations unlabored  Breasts: No breast masses, tenderness, asymmetry, or nipple discharge.  Heart: Regular rate and rhythm, S1 and S2 normal, no murmur, rub, or gallop, Abdomen: Soft, non-tender, bowel sounds active all four quadrants,  no masses, no organomegaly  Pelvic:Normally developed genitalia with no external lesions or eruptions. Vagina and cervix show no lesions, inflammation, discharge or tenderness. No cystocele, No rectocele. Uterus normal.  No adnexal mass or tenderness.    Extremities: Extremities normal, atraumatic, no cyanosis or edema  Skin: Skin color, texture, turgor normal, no rashes or lesions  Lymph nodes: Cervical, supraclavicular, and axillary nodes normal  Neurologic: Normal      "

## 2021-06-09 NOTE — TELEPHONE ENCOUNTER
----- Message from Aruna Fontaine MD sent at 7/13/2020 10:17 AM CDT -----  Please let patient know that labs show her hemoglobin and iron stores and iron levels are adequate.  Would she like me to put the referral in for evaluation of sleep apnea?

## 2021-06-09 NOTE — ANESTHESIA PREPROCEDURE EVALUATION
Anesthesia Evaluation      Patient summary reviewed     Airway   Mallampati: I  Neck ROM: full   Pulmonary - negative ROS and normal exam    breath sounds clear to auscultation                         Cardiovascular - negative ROS and normal exam  Rhythm: regular  Rate: normal,         Neuro/Psych - negative ROS     Endo/Other    (+) diabetes mellitus using insulin, pregnant     GI/Hepatic/Renal - negative ROS           Dental - normal exam                        Anesthesia Plan  Planned anesthetic: epidural    Chart reviewed. Patient examined.    Epidural procedure and its risks, including, but not limited to headache, nerve damage, bleeding, infection, back pain, low blood pressure, cardiorespiratory arrest, and block failure were discussed fully with patient.   Opportunity for questions given.   Informed consent obtained. Desires to proceed.     ASA 3     Anesthetic plan and risks discussed with: patient

## 2021-06-09 NOTE — TELEPHONE ENCOUNTER
Pt notified of results, she would like referral for a sleep study, states she wants the [place] location that [facility] has used, sates  Number that was given to her through [employer] was for  All hospitals locations through [facility]. She doesn't want to drive to hospitals.

## 2021-06-09 NOTE — ANESTHESIA POSTPROCEDURE EVALUATION
Patient: Neha Mcginnis  * No procedures listed *  Anesthesia type: regional    Patient location: Labor and Delivery  Last vitals:   Vitals:    03/31/17 0203   BP: 116/65   Pulse: 90   Resp: 16   Temp: 37.1  C (98.7  F)   SpO2: 98%     Post vital signs: stable  Level of consciousness: awake and responds to simple questions  Post-anesthesia pain: pain controlled  Post-anesthesia nausea and vomiting: no  Pulmonary: unassisted, return to baseline  Cardiovascular: stable and blood pressure at baseline  Hydration: adequate  Anesthetic events: no    QCDR Measures:  ASA# 11 - Anamika-op Cardiac Arrest: ASA11B - Patient did NOT experience unanticipated cardiac arrest  ASA# 12 - Anamika-op Mortality Rate: ASA12B - Patient did NOT die  ASA# 13 - PACU Re-Intubation Rate: ASA13B - Patient did NOT require a new airway mgmt  ASA# 10 - Composite Anes Safety: ASA10A - No serious adverse event  ASA# 38 - New Corneal Injury: ASA38A - No new exposure keratitis or corneal abrasion in PACU    Additional Notes:

## 2021-06-09 NOTE — PROGRESS NOTES
31-year-old  female at 34 weeks and 3 days gestation.  She has gestational diabetes.  Her fasting blood cultures have been normal.  She states she's noticed distinct increase in her postprandial blood sugars.  Wears they were previously normal taking approximately 6 units of insulin with her meals, over the last couple weeks they have increased.  She is not taking 16 units with each meal.  She has not changed significantly with me that she is eating.  She over the last 3 days has not been able to check her blood sugars but prior to that she states that they were in the 200s.  She states her fasting blood sugars are still in the 80s.  At this time I ordered her weekly biophysical profiles and NSTs.  Will be delivering between 38 and 39 weeks gestation right now planned for .  I asked her to see diabetes education this week and bring blood sugars with her.  We reviewed treatment for hypoglycemia.  I'll see her again in 1 week.

## 2021-06-09 NOTE — PROGRESS NOTES
31-year-old  female at 30 weeks 3 days gestation.  For inducing tomorrow due to gestational diabetes on insulin with inconsistent control.  Her weekly biophysical profiles and NSTs have been normal.  She's GBS negative and O+ blood type.  We'll induce using Cervidil for hope score of 5.  We will manage her blood sugars to maintain blood sugars less than 140 through the course of labor.  As she's been having low blood sugars at night, recommended she decrease her evening meal insulin to 12 units and add night time snack

## 2021-06-10 NOTE — PROGRESS NOTES
Assessment:        Neha Mcginnis is a 32 y.o. female here for postpartum exam at 6 weeks postpartum. Pap smear not done at today's visit.   1. Rash  Thyroid Cascade   2. Insulin dependent gestational diabetes mellitus (GDM), antepartum  Glycosylated Hemoglobin A1c   3. Hyperthyroidism     . .  She is currently asymptomatic with regards to her hypothyroidism.  She is no longer taking methimazole.  We will recheck her thyroid labs today.  Will check hemoglobin A1c and recommend yearly blood sugar monitoring due to her gestational diabetes.  She does elect for an IUD.  We discussed all 3 options today including the gen Mirena and ParaGard.  She is opting for the ParaGard IUD.  She will call and schedule when she is ready.  She is advised to make this appointment 1-2 days after her period or abstain from intercourse 2 weeks prior.  Plan:        1. Routine follow up in 1 year, yearly monitoring of BS due to GDM  2. Contraception: IUD- will return for paraguard  3. No Follow-up on file.      Subjective:       Neha Mcginnis is a 32 y.o. female who presents for a postpartum visit. She is 6 weeks postpartum following a spontaneous vaginal delivery. I have fully reviewed the prenatal and intrapartum course. The delivery was at 38w6d gestational weeks. Outcome: spontaneous vaginal delivery. Postpartum course has been uncomplicated. Baby's course has been uncomplicated. Baby is feeding by bottle - Similac Alimentum. Bled for  3 weeks postpartum.  Currently bleeding  no bleeding. Bowel function is normal. Bladder function is normal. Patient has not resumed intercourse. Contraception method is none. Postpartum depression screening score: no complaints     The following portions of the patient's history were reviewed and updated as appropriate: allergies, current medications and problem list    Review of Systems  General:  Denies problem  Eyes: Denies problem  Ears/Nose/Throat: Denies problem  Cardiovascular: Denies  problem  Respiratory:  Denies problem  Gastrointestinal:  Denies problem, Genitourinary: Denies problem  Musculoskeletal:  Denies problem  Skin: rash right knee  Neurologic: Denies problem  Psychiatric: Denies problem  Endocrine: Denies problem  Heme/Lymphatic: Denies problem   Allergic/Immunologic: Denies problem          Objective:         Vitals:    05/09/17 1349   BP: 100/70   Pulse: 64   Resp: 16   Weight: 159 lb (72.1 kg)       Physical Exam:  General Appearance: Alert, cooperative, no distress, appears stated age  Head: Normocephalic, without obvious abnormality, atraumatic  Eyes: PERRL, conjunctiva/corneas clear, EOM's intact  Ears: Normal TM's and external ear canals, both ears  Nose: Nares normal, septum midline,mucosa normal, no drainage  Throat: Lips, mucosa, and tongue normal; teeth and gums normal  Neck: Supple, symmetrical, trachea midline, no adenopathy;  thyroid: not enlarged, symmetric, no tenderness/mass/nodules; no carotid bruit or JVD  Back: Symmetric, no curvature, ROM normal, no CVA tenderness  Lungs: Clear to auscultation bilaterally, respirations unlabored  Breasts: No breast masses, tenderness, asymmetry, or nipple discharge.  Heart: Regular rate and rhythm, S1 and S2 normal, no murmur, rub, or gallop, Abdomen: Soft, non-tender, bowel sounds active all four quadrants,  no masses, no organomegaly  Pelvic:Normally developed genitalia with no external lesions or eruptions. Vagina and cervix show no lesions, inflammation, discharge or tenderness. No cystocele, No rectocele. Uterus normal.  No adnexal mass or tenderness.    Extremities: Extremities normal, atraumatic, no cyanosis or edema  Skin: Skin color, texture, turgor normal, no rashes or lesions  Lymph nodes: Cervical, supraclavicular, and axillary nodes normal  Neurologic: Normal

## 2021-06-10 NOTE — TELEPHONE ENCOUNTER
Refill Approved    Rx renewed per Medication Renewal Policy. Medication was last renewed on 5/14/0.    Perlita Ardon, [facility] Connection Triage/Med Refill 8/7/2020     Requested Prescriptions   Pending Prescriptions Disp Refills     famotidine (PEPCID) 20 MG tablet [Pharmacy Med Name: FAMOTIDINE 20MG TABLETS] 60 tablet 1     Sig: TAKE 1 TABLET(20 MG) BY MOUTH TWICE DAILY       GI Medications Refill Protocol Passed - 8/7/2020  8:59 AM        Passed - PCP or prescribing provider visit in last 12 or next 3 months.     Last office visit with prescriber/PCP: Visit date not found OR same dept: 9/25/2019 Mary Hodge MD OR same specialty: 7/10/2020 Aruna Fontaine MD  Last physical: Visit date not found Last MTM visit: Visit date not found   Next visit within 3 mo: Visit date not found  Next physical within 3 mo: Visit date not found  Prescriber OR PCP: Wandy Monte MD  Last diagnosis associated with med order: 1. Heartburn  - famotidine (PEPCID) 20 MG tablet [Pharmacy Med Name: FAMOTIDINE 20MG TABLETS]; TAKE 1 TABLET(20 MG) BY MOUTH TWICE DAILY  Dispense: 60 tablet; Refill: 1    If protocol passes may refill for 12 months if within 3 months of last provider visit (or a total of 15 months).

## 2021-06-11 NOTE — PROGRESS NOTES
Assessment:     1. Elevated blood pressure  HM1(CBC and Differential)    Basic Metabolic Panel    HM1 (CBC with Diff)   2. Amenorrhea  Pregnancy (Beta-hCG, Qual), Urine   3. Episodic lightheadedness  HM1(CBC and Differential)    Basic Metabolic Panel    HM1 (CBC with Diff)          Plan:     Unclear as to the etiology of her mildly elevated blood pressure.  It sounds significantly elevated today and her pulse is now normal.  Commend just continuing to monitor this for now.  Pregnancy test negative and CBC is unremarkable.  Awaiting results of BMP.  Will notify her the results of this we get it back.  Recommend pushing fluids and follow-up if symptoms are getting worse or not improving.    Greater than 50% of this 25 minute visit was spent in counseling regarding her above symptoms.    Subjective:       32 y.o. female presents for evaluation of a 2 day history of generally feeling under the weather.  She went to the chiropractor and they noted that her blood pressure was 160/99 and the second time he checked it was 150/100.  Her pulse was a bit low around 50.  She has been feeling a bit shaky and tired over the past day.  She did have a headache yesterday but this is better today.  She had some nausea yesterday which is now resolved.  She is 3 months postpartum and did have gestational diabetes during her pregnancy but did not have any significant issues with her blood pressure.  She has had one ventral cycle since the birth of her child which was about 6 weeks ago.  She is concerned that she may be pregnant and took a pregnancy test at home which was negative.  Would like this checked here today.    The following portions of the patient's history were reviewed and updated as appropriate: allergies, current medications, past family history, past medical history, past social history, past surgical history and problem list.    Review of Systems  A 12 point comprehensive review of systems was negative except as noted.      Objective:      /80  Pulse 66  Temp 99.2  F (37.3  C) (Oral)   Wt 155 lb 12.8 oz (70.7 kg)  LMP 05/15/2017  SpO2 98%  BMI 29.44 kg/m2  General appearance: alert, appears stated age and cooperative  Eyes: conjunctivae/corneas clear. PERRL, EOM's intact. Fundi benign.  Ears: normal TM's and external ear canals both ears  Throat: lips, mucosa, and tongue normal; teeth and gums normal  Neck: no adenopathy, supple, symmetrical, trachea midline and thyroid not enlarged, symmetric, no tenderness/mass/nodules  Lungs: clear to auscultation bilaterally  Heart: regular rate and rhythm, S1, S2 normal, no murmur, click, rub or gallop  Abdomen: soft, non-tender; bowel sounds normal; no masses,  no organomegaly  Extremities: extremities normal, atraumatic, no cyanosis or edema  Pulses: 2+ and symmetric  Skin: Skin color, texture, turgor normal. No rashes or lesions     Recent Results (from the past 24 hour(s))   Pregnancy (Beta-hCG, Qual), Urine   Result Value Ref Range    Pregnancy Test, Urine Negative Negative    Specific Gravity, UA 1.005 1.001 - 1.030   HM1 (CBC with Diff)   Result Value Ref Range    WBC 5.9 4.0 - 11.0 thou/uL    RBC 5.00 3.80 - 5.40 mill/uL    Hemoglobin 14.7 12.0 - 16.0 g/dL    Hematocrit 44.4 35.0 - 47.0 %    MCV 89 80 - 100 fL    MCH 29.4 27.0 - 34.0 pg    MCHC 33.1 32.0 - 36.0 g/dL    RDW 12.2 11.0 - 14.5 %    Platelets 224 140 - 440 thou/uL    MPV 8.2 7.0 - 10.0 fL    Neutrophils % 58 50 - 70 %    Lymphocytes % 33 20 - 40 %    Monocytes % 5 2 - 10 %    Eosinophils % 3 0 - 6 %    Basophils % 1 0 - 2 %    Neutrophils Absolute 3.4 2.0 - 7.7 thou/uL    Lymphocytes Absolute 1.9 0.8 - 4.4 thou/uL    Monocytes Absolute 0.3 0.0 - 0.9 thou/uL    Eosinophils Absolute 0.2 0.0 - 0.4 thou/uL    Basophils Absolute 0.0 0.0 - 0.2 thou/uL          This note has been dictated using voice recognition software. Any grammatical or context distortions are unintentional and inherent to the software

## 2021-06-12 NOTE — TELEPHONE ENCOUNTER
Reason contacted:  Appointment   Information relayed:  Called and scheduled an appointment at 2:40 pm today with Dr. Morales.   Additional questions:  No  Further follow-up needed:  No  Okay to leave a detailed message:  No

## 2021-06-12 NOTE — TELEPHONE ENCOUNTER
New Appointment Needed  What is the reason for the visit:    Same Date/Next Day Appt Request  What is the reason for your visit?: All over nerve pain. Patient would like something to help with the pain.    Provider Preference: Any available  How soon do you need to be seen?: today  Waitlist offered?: No  Okay to leave a detailed message:  Yes

## 2021-06-12 NOTE — PROGRESS NOTES
ASSESSMENT/PLAN:  1. Paresthesias  35-year-old female with new feelings of paresthesia in her extremities.  With her concern for possible mercury toxicity, we will check heavy metal screen.  Given her combination of blood pressure issues headaches and anxiety, would also want evaluate for abnormal cortisol levels, vitamin D levels and autoimmune disorders.  Her thyroid levels have already been checked in addition to her liver and renal function and these are all normal.  Currently she is trying to increase her gabapentin which has been slightly helpful for these paresthesias.  She was recommended to be evaluated for possible obstructive sleep apnea but is not established this appointment yet.  - Heavy Metals Screen, Blood; Future  - Antinuclear Antibody (COLEMAN) Cascade; Future  - Vitamin D, Total (25-Hydroxy); Future  - Cortisol; Future  - gabapentin (NEURONTIN) 100 MG capsule; Take 1 tab po in am, 1 tab po in afternoon, and 2 tabs po qhs  Dispense: 120 capsule; Refill: 2    2. Alopecia areata  She has a known history of alopecia areata and she is referred back to dermatology for this.  - Ambulatory referral to Dermatology; Future    3. Goiter  Thyroid did feel enlarged on exam today and I do recommend an ultrasound for further evaluation.  - US Thyroid; Future      Dragon dictation was used for this note.  Speech recognition errors are a possibility.    No follow-ups on file.  There are no Patient Instructions on file for this visit.    Orders Placed This Encounter   Procedures     US Thyroid     Standing Status:   Future     Standing Expiration Date:   10/20/2021     Order Specific Question:   Reason for Exam (Describe Symptoms):     Answer:   enlarged thyroid     Order Specific Question:   Is the patient pregnant?     Answer:   No     Order Specific Question:   Can the procedure be changed per Radiologist protocol?     Answer:   Yes     Heavy Metals Screen, Blood     Standing Status:   Future     Number of  Occurrences:   1     Standing Expiration Date:   10/20/2021     Order Specific Question:   Patient's ethnicity:     Answer:   Non-     Antinuclear Antibody (COLEMAN) Cascade     Standing Status:   Future     Number of Occurrences:   1     Standing Expiration Date:   10/20/2021     Vitamin D, Total (25-Hydroxy)     Standing Status:   Future     Number of Occurrences:   1     Standing Expiration Date:   10/20/2021     Cortisol     Standing Status:   Future     Number of Occurrences:   1     Standing Expiration Date:   10/20/2021     Ambulatory referral to Dermatology     Standing Status:   Future     Standing Expiration Date:   2/20/2021     Referral Priority:   Routine     Referral Type:   Consultation     Referral Reason:   Evaluation and Treatment     Requested Specialty:   Dermatology     Number of Visits Requested:   1     Medications Discontinued During This Encounter   Medication Reason     oxybutynin (DITROPAN XL) 10 MG ER tablet      gabapentin (NEURONTIN) 100 MG capsule Reorder         CHIEF COMPLAINT;  Chief Complaint   Patient presents with     Medication Check       HISTORY OF PRESENT ILLNESS:  Neha is a 35 y.o. female presenting to the clinic today for concerns regarding her paresthesias.  She was seen in the emergency room and then follow-up with Dr. Morales regarding the symptoms.  They are associated with some headache symptoms as well as elevated blood pressures.  She has known history of thyroid disease and did have her thyroid levels checked which were normal.  Her blood pressures now seem to be returning to normal.  She is now wondering if some of her sensation started after she had some dental work done which she feels she may have been exposed to mercury.  At her last visit she did start gabapentin which she feels might be helping her but she is not sure that it is getting at the root of her issues.  She is also concerned about hair loss which has been patchy and occurred in the  past.    Remainder of 12-point ROS is negative.    TOBACCO USE:  Social History     Tobacco Use   Smoking Status Former Smoker   Smokeless Tobacco Never Used   Tobacco Comment    quit 2006       VITALS:  Vitals:    10/20/20 1604   BP: 130/82   Patient Site: Left Arm   Patient Position: Sitting   Cuff Size: Adult Regular   Pulse: (!) 57   SpO2: 97%   Weight: 161 lb 9.6 oz (73.3 kg)     Wt Readings from Last 3 Encounters:   10/20/20 161 lb 9.6 oz (73.3 kg)   10/02/20 165 lb (74.8 kg)   10/01/20 165 lb 9.6 oz (75.1 kg)     Body mass index is 30.71 kg/m .    PHYSICAL EXAM:  GENERAL APPEARANCE: Alert, cooperative, no distress, appears stated age  NECK: Supple, thyroid feels somewhat enlarged but symmetrically enlarged without nodules.    LUNGS: Clear to auscultation bilaterally, respirations unlabored  HEART: Regular rate and rhythm, S1 and S2 normal, no murmur, rub or gallop  ABDOMEN: Soft, non-tender, bowel sounds active all four quadrants,     no masses, no organomegaly  EXTREMITIES: Extremities normal, atraumatic, no cyanosis or edemas  SKIN: Skin color, texture, turgor normal, no rashes or lesions  LYMPH NODES: Cervical, supraclavicular, and axillary nodes normal  NEUROLOGIC: CNII-XII intact. Normal strength, sensation and reflexes       throughout    RECENT RESULTS  No results found for this or any previous visit (from the past 48 hour(s)).    MEDICATIONS:  Current Outpatient Medications   Medication Sig Dispense Refill     acetaminophen (TYLENOL) 325 MG tablet Take 1-2 tablets (325-650 mg total) by mouth every 4 (four) hours as needed.  0     cetirizine (ZYRTEC) 10 MG tablet Take 1 tablet (10 mg total) by mouth 2 (two) times a day as needed for allergies. 90 tablet 3     cholecalciferol, vitamin D3, (VITAMIN D3 ORAL) Take 5,000 Units by mouth.       docusate sodium (COLACE) 100 MG capsule Take 1 capsule (100 mg total) by mouth 2 (two) times a day. 60 capsule 3     famotidine (PEPCID) 20 MG tablet TAKE 1 TABLET(20  MG) BY MOUTH TWICE DAILY 180 tablet 3     ferrous sulfate 325 (65 FE) MG tablet        fluticasone propionate (FLONASE) 50 mcg/actuation nasal spray 1 spray into each nostril 2 (two) times a day. 16 g 3     gabapentin (NEURONTIN) 100 MG capsule Take 1 tab po in am, 1 tab po in afternoon, and 2 tabs po qhs 120 capsule 2     hydrocortisone 2.5 % cream APPLY EXTERNALLY TO THE AFFTECTED AREA TWICE DAILY as needed 28 g 2     ibuprofen (ADVIL,MOTRIN) 800 MG tablet Take 1 tablet (800 mg total) by mouth every 8 (eight) hours. 30 tablet 0     UNABLE TO FIND Med Name: Immune Support       VITAMIN B-6 25 MG tablet        No current facility-administered medications for this visit.

## 2021-06-12 NOTE — PROGRESS NOTES
Patient ID: Neha Mcginnis is a 35 y.o. female.  /86   Pulse 73   Wt 165 lb (74.8 kg)   SpO2 98%   BMI 31.35 kg/m      Assessment/Plan:                  Diagnoses and all orders for this visit:    Paresthesias  -     Comprehensive Metabolic Panel  -     Thyroid Cascade  -     gabapentin (NEURONTIN) 100 MG capsule; Take 100 mg by mouth 3 (three) times a day. Begin with 1 capsule at night for 3 days, then 1 capsule morning and night for the next 3 days, then 3 times daily thereafter  Dispense: 90 capsule; Refill: 2    Sleep apnea-like behavior  -     Comprehensive Metabolic Panel  -     Ambulatory referral to Sleep Medicine    Hyperthyroidism  -     Thyroid Cascade    Gestational diabetes mellitus (GDM), antepartum, gestational diabetes method of control unspecified  -     Glycosylated Hemoglobin A1c      DISCUSSION  She has a normal clinical exam.  Her description of her symptoms does not fit a distinct pattern where I could make any specific diagnosis.  I do think it would be reasonable to check thyroid.  I do think it would be reasonable to check an A1c given her history of gestational diabetes.  I also think it would be ideal to pursue evaluation of potential underlying sleep apnea.  Since she did not schedule the appointment I will place the referral again for the sleep study.    We discussed the possibility of initiating antihypertensive therapy versus taking more of an approach to help with the underlying symptomatic phenomenon.  She would strongly prefer that we treat the nerves prior to initiating any treatment for blood pressure.  We discussed extensively gabapentin.  I do think it would be reasonable to undergo a trial beginning with a low-dose of 100 mg.  I do think further follow-up with primary care provider would be ideal.  Discussed that if issues arise with laboratory testing we will take appropriate action.  Recommend 2-week follow-up with primary provider Dr. Shore.  Subjective:      CHARLI Mcginnis is a 35 y.o. female is seen here today for concerns including paresthesias, elevated blood pressure, headache and anxiety.    She was seen yesterday in the emergency department.  She has had longstanding paresthesias.  She reports full body strange sensations that she describes as feeling like electric shocks that occur everywhere in her body.  She also has accompanying numbness and tingling sensation at times.  She reports with major dietary change including moving toward a keto diet she had improvement.  Also reports some improvement during recent pregnancy.  Patient delivered normal spontaneous vaginal delivery at term back in May 2020.  She reports everything is going well postpartum.  She reports a lot of frustration about her symptoms.  She has a great deal of difficulty being able to explain it.  She thinks it might be related to thyroid.  She does have a history of subclinical hyperthyroidism.  Last TSH was within the normal range although toward the lower end of normal.  She also has a history of gestational diabetes.  Patient also has had symptoms including snoring, headaches and fatigue that are likely just of underlying sleep apnea but she has not had an appointment with a sleep specialist set up yet.  She was evaluated earlier this summer and that had been recommended.  She has noted to have had elevated blood pressure readings that were significant to the emergency department, she reports checking on her own reports readings in the 140 up to 150 systolic range.  Her blood pressure today here is elevated.  She reports taking her 's blood pressure medication, losartan yesterday.  Patient also reports ingesting CBD oil to try and help with her sensation.  Patient would like to have testing done and would like to consider a medication to help with her neurologic phenomenon.    Review of Systems  Complete review of systems is obtained.  Other than the specific considerations  noted above complete review of systems is negative.          Objective:   Medications:  Current Outpatient Medications   Medication Sig Note     acetaminophen (TYLENOL) 325 MG tablet Take 1-2 tablets (325-650 mg total) by mouth every 4 (four) hours as needed.      cetirizine (ZYRTEC) 10 MG tablet Take 1 tablet (10 mg total) by mouth 2 (two) times a day as needed for allergies.      cholecalciferol, vitamin D3, (VITAMIN D3 ORAL) Take 5,000 Units by mouth. 7/10/2020: Mostly in the fall and winter      docusate sodium (COLACE) 100 MG capsule Take 1 capsule (100 mg total) by mouth 2 (two) times a day.      famotidine (PEPCID) 20 MG tablet TAKE 1 TABLET(20 MG) BY MOUTH TWICE DAILY      ferrous sulfate 325 (65 FE) MG tablet       fluticasone propionate (FLONASE) 50 mcg/actuation nasal spray 1 spray into each nostril 2 (two) times a day.      hydrocortisone 2.5 % cream APPLY EXTERNALLY TO THE AFFTECTED AREA TWICE DAILY as needed      ibuprofen (ADVIL,MOTRIN) 800 MG tablet Take 1 tablet (800 mg total) by mouth every 8 (eight) hours.      UNABLE TO FIND Med Name: Immune Support      VITAMIN B-6 25 MG tablet       gabapentin (NEURONTIN) 100 MG capsule Take 100 mg by mouth 3 (three) times a day. Begin with 1 capsule at night for 3 days, then 1 capsule morning and night for the next 3 days, then 3 times daily thereafter      oxybutynin (DITROPAN XL) 10 MG ER tablet Take 1 tablet (10 mg total) by mouth daily.        Allergies:  Allergies   Allergen Reactions     Other Allergy (See Comments) Rash     [place] CAPS, 03/07/2014.       Dust [Other Environmental Allergy]        Tobacco:   reports that she has quit smoking. She has never used smokeless tobacco.     Physical Exam          /86   Pulse 73   Wt 165 lb (74.8 kg)   SpO2 98%   BMI 31.35 kg/m              General Appearance:    Alert, cooperative, no distress   Eyes:   No scleral icterus or conjunctival irritation       Ears:    Normal TM's and external ear canals,  both ears   Throat:   Lips, mucosa, and tongue normal; teeth and gums normal   Neck:   Supple, symmetrical, trachea midline, no adenopathy;        thyroid:  No enlargement/tenderness/nodules   Lungs:     Clear to auscultation bilaterally, respirations unlabored, no wheezes or crackles   Heart:    Regular rate and rhythm,  No murmur   Extremities:  No edema, no joint swelling or redness, no evidence of any injuries   Skin:  No concerning skin findings, no suspicious moles, no rashes   Neurologic:  On gross examination there is no motor or sensory deficit.  Patient walks with a normal gait

## 2021-06-13 NOTE — PROGRESS NOTES
Assessment/Plan:        Diagnoses and all orders for this visit:    Dizzy  -     HM2(CBC w/o Differential)  -     Basic Metabolic Panel    Hyperthyroidism  -     Thyroid Cascade  -     Thyroid Stimulating Hormone (TSH)  -     T4, Free    Goiter  -     Thyroid Stimulating Hormone (TSH)  -     T4, Free    Other orders  -     clobetasol (TEMOVATE) 0.05 % external solution; APPLY TO SCALP BID PRN FOR SCALING AND ITCHING; Refill: 11    Lice- Dicussed using [employer] and considering lice salon for family      Irregular menses-  Check thyroid function. If persists, consider ultrasound and hormone evaluation.    Subjective:    Patient ID: Neha Mcginnis is a 32 y.o. female.    HPI Comments: 32-year-old female presents today for several concerns.  First she states she has had lice in her household.  She has had a difficult time getting rid of it.  She states she is used.  Over-the-counter.  She feels that she herself may have it also.  She is trying to do a lot of cleaning in her home and the treatments but states it seems to be coming back.  She is complaining of irregular periods.  She has had a couple months of usual.  Intervals and heavy bleeding. She is not trying to become pregnant.  She is also feeling a bump in her vaginal area.  No pelvic pain or discharge.      The following portions of the patient's history were reviewed and updated as appropriate:   Current Outpatient Prescriptions   Medication Sig Dispense Refill     clobetasol (TEMOVATE) 0.05 % cream Apply to affected area BID for up to 2 weeks 30 g 0     clobetasol (TEMOVATE) 0.05 % external solution APPLY TO SCALP BID PRN FOR SCALING AND ITCHING  11     hydrocortisone (ANUSOL-HC) 2.5 % rectal cream Apply 1-2 times to affected area for no more than 1 week 30 g 0     hydrocortisone 2.5 % cream        prenatal vitamin#7-iron-FA-dha 28-1. mg cap Take 1 tablet by mouth daily. 90 each 3     No current facility-administered medications for this visit.      She is  allergic to other allergy (see comments)..    Review of Systems   Constitutional: Negative for fatigue and fever.   Respiratory: Negative for shortness of breath.    Cardiovascular: Negative for chest pain.   Gastrointestinal: Negative for abdominal pain.   Endocrine: Negative for cold intolerance.   Genitourinary: Positive for menstrual problem. Negative for dyspareunia and pelvic pain.   Neurological: Negative for dizziness.             Objective:    Physical Exam   Constitutional: She appears well-developed and well-nourished.   Neck: No thyromegaly present.   Cardiovascular: Normal rate and regular rhythm.    Pulmonary/Chest: Effort normal and breath sounds normal.   Abdominal: Soft. Bowel sounds are normal. She exhibits no distension.   Genitourinary: Vagina normal. No vaginal discharge found.   Genitourinary Comments: No evidence for lump or abnormality

## 2021-06-14 NOTE — PROGRESS NOTES
Assessment/Plan:        Diagnoses and all orders for this visit:    Sinusitis, acute  -     Rapid Strep A Screen-Throat  -     Group A Strep, RNA Direct Detection, Throat  -     fluticasone (FLONASE) 50 mcg/actuation nasal spray; 2 sprays into each nostril daily.  Dispense: 16 g; Refill: 2  -     azithromycin (ZITHROMAX) 250 MG tablet; Take 500 mg (2 x 250 mg tablets) on day 1 followed by 250 mg (1 tablet) on days 2-5.  Dispense: 6 tablet; Refill: 0  Strep test was negative.  She still has some viral upper respiratory tract infection possibly viral sinusitis.  We did discuss symptom management and treatment at this point.  She will use ibuprofen or Tylenol to lessen the headache and pain do some salt water gargles as needed.  She can also continue to do the DayQuil and NyQuil that she is used since the beginning of this illness since it has been helpful.  I did put in a prescription for Flonase which can also be helpful as well as giving her Zithromax she can start in the next for 5 days if the symptoms are not improved.  She will was told to go ahead and take the antibiotics if her children found to have positive strep otherwise that she will adjust to the symptom control and use antibiotics if symptoms continue into the weekend.        Subjective:    Patient ID: Neha Mcginnis is a 32 y.o. female.    Fever    This is a new (Noted sudden onset of fever with sore throat and body aches.  Noted sinus congestion as well as drainage.  Whole family appears to be having same symptoms.) problem. The current episode started in the past 7 days (Started since Monday about 3 days ago.). The problem occurs constantly. The maximum temperature noted was 100 to 100.9 F. Associated symptoms include congestion, coughing, headaches and a sore throat. Pertinent negatives include no abdominal pain, chest pain, diarrhea, ear pain, rash, vomiting or wheezing. She has tried nothing for the symptoms.   Risk factors: sick contacts    Noted  episodes of what she called twitching at home has less of the twitching but noted that her kids had more twitching especially with increased temperature.    The following portions of the patient's history were reviewed and updated as appropriate: allergies, current medications, past family history, past medical history, past social history, past surgical history and problem list.    Review of Systems   Constitutional: Positive for appetite change, chills, fatigue and fever.   HENT: Positive for congestion, postnasal drip, rhinorrhea, sinus pressure and sore throat. Negative for ear pain, sinus pain and voice change.    Eyes: Negative.    Respiratory: Positive for cough and chest tightness. Negative for wheezing.    Cardiovascular: Negative for chest pain.   Gastrointestinal: Negative for abdominal pain, diarrhea and vomiting.   Musculoskeletal: Negative for back pain.   Skin: Negative for rash.   Neurological: Positive for headaches.           Vitals:    11/29/17 1139   BP: 122/80   Patient Site: Left Arm   Patient Position: Sitting   Cuff Size: Adult Regular   Pulse: 97   Temp: 98.8  F (37.1  C)   TempSrc: Oral   SpO2: 97%   Weight: 157 lb 8 oz (71.4 kg)       Objective:    Physical Exam   Constitutional: She is oriented to person, place, and time. She appears well-developed and well-nourished.   HENT:   Right Ear: Tympanic membrane normal.   Left Ear: Tympanic membrane normal.   Nose: Rhinorrhea present. No sinus tenderness. Right sinus exhibits no maxillary sinus tenderness and no frontal sinus tenderness. Left sinus exhibits no maxillary sinus tenderness and no frontal sinus tenderness.   Mouth/Throat: Oropharyngeal exudate, posterior oropharyngeal edema and posterior oropharyngeal erythema present.   Eyes: Pupils are equal, round, and reactive to light.   Neck: Normal range of motion. Neck supple.   Cardiovascular: Normal rate and regular rhythm.    Pulmonary/Chest: Effort normal and breath sounds normal.    Lymphadenopathy:     She has no cervical adenopathy.   Neurological: She is alert and oriented to person, place, and time.   Skin: No rash noted.

## 2021-06-14 NOTE — TELEPHONE ENCOUNTER
Refill Approved    Rx renewed per Medication Renewal Policy. Medication was last renewed on 10/20/20.    Perlita Ardon, [facility] Triage/Med Refill 1/12/2021     Requested Prescriptions   Pending Prescriptions Disp Refills     gabapentin (NEURONTIN) 100 MG capsule [Pharmacy Med Name: GABAPENTIN 100MG CAPSULES] 120 capsule 2     Sig: TAKE 1 CAPSULE BY MOUTH EVERY MORNING AND 1 CAPSULE BY MOUTH EVERY AFTERNOON AND 2 CAPSULES BY MOUTH EVERY NIGHT AT BEDTIME       Gabapentin/Levetiracetam/Tiagabine Refill Protocol  Passed - 1/12/2021 11:42 AM        Passed - PCP or prescribing provider visit in past 12 months or next 3 months     Last office visit with prescriber/PCP: 10/20/2020 Haily Shore MD OR same dept: 10/20/2020 Haily Shore MD OR same specialty: 10/20/2020 Haily Shore MD  Last physical: Visit date not found Last MTM visit: Visit date not found   Next visit within 3 mo: Visit date not found  Next physical within 3 mo: Visit date not found  Prescriber OR PCP: Haily Shore MD  Last diagnosis associated with med order: 1. Paresthesias  - gabapentin (NEURONTIN) 100 MG capsule [Pharmacy Med Name: GABAPENTIN 100MG CAPSULES]; TAKE 1 CAPSULE BY MOUTH EVERY MORNING AND 1 CAPSULE BY MOUTH EVERY AFTERNOON AND 2 CAPSULES BY MOUTH EVERY NIGHT AT BEDTIME  Dispense: 120 capsule; Refill: 2    If protocol passes may refill for 12 months if within 3 months of last provider visit (or a total of 15 months).

## 2021-06-15 NOTE — PROGRESS NOTES
Assessment   Neha Mcginnis is a 32 y.o. female who is New patient to my practice here with   Chief Complaint   Patient presents with     Menstrual Problem     irregular menses x2 months, very heavy for December and January, got twice in December          Problem List Items Addressed This Visit     None      Visit Diagnoses     Irregular menstrual bleeding    -  Primary    Relevant Orders    Pregnancy, Urine (Completed)    Rash and nonspecific skin eruption        Relevant Medications    hydrocortisone 2.5 % cream    Vaginal discharge        Relevant Orders    Wet Prep, Vaginal (Completed)          Plan:     Please see patient instructions     Subjective:      HPI: Neha Mcginnis is a 32 y.o. female here with CC of irregular bleeding and vaginal discharge     Irregular Menstruation: Patient complains of irregular menses since early December got 2 heavy cycle last month and again . Patient's last menstrual period was 12/29/2017 (exact date). Periods are irregular, lasting 7 days. Dysmenorrhea:mild, occurring first 1-2 days of flow. Cyclic symptoms include bloating, breast tenderness, fluid retention and irritability.  Current contraception: none. History of infertility: no. History of abnormal Pap smear: no  Vaginal discharge started yesterday after she finished her cycle no itching smell or burning       Past Medical History:   Diagnosis Date     Gestational diabetes     on insulin     Skin cancer 1988     Past Surgical History:   Procedure Laterality Date     APPENDECTOMY       Other allergy (see comments)  Current Outpatient Prescriptions   Medication Sig Dispense Refill     hydrocortisone 2.5 % cream Use on affected area twice daily 30 g 0     azithromycin (ZITHROMAX) 250 MG tablet Take 500 mg (2 x 250 mg tablets) on day 1 followed by 250 mg (1 tablet) on days 2-5. 6 tablet 0     clobetasol (TEMOVATE) 0.05 % cream Apply to affected area BID for up to 2 weeks 30 g 0     clobetasol (TEMOVATE) 0.05 % external solution APPLY  TO SCALP BID PRN FOR SCALING AND ITCHING  11     fluticasone (FLONASE) 50 mcg/actuation nasal spray 2 sprays into each nostril daily. 16 g 2     hydrocortisone (ANUSOL-HC) 2.5 % rectal cream Apply 1-2 times to affected area for no more than 1 week 30 g 0     norgestimate-ethinyl estradiol (SPRINTEC, 28,) 0.25-35 mg-mcg per tablet Take 1 tablet by mouth daily. 3 Package 4     prenatal vitamin#7-iron-FA-dha 28-1. mg cap Take 1 tablet by mouth daily. 90 each 3     No current facility-administered medications for this visit.      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   HEENT: normocephalic conjunctivae are clear, Normal pearly TMs bilaterally without erythema, pus or fluid. Position and landmarks are normal.  Nose is clear.  Oropharynx is moist and clear, without tonsillar hypertrophy, asymmetry, exudate or lesions.  Neck: supple without adenopathy or thyromegaly.  Lungs: Good aeration bilaterally. No prolongation of expiratory phase.   No tachypnea, retractions, or increased work of breathing. Clear to auscultation without wheezes, rales or rhonci.    Heart: regular rate and rhythm, normal S1 and S2, no murmurs  Abdomen: soft and nontender, bowel sounds are present, no hepatosplenomegaly or mass palpable.  Skin: clear without rash or  lesions  Neuro: alert, interactive moving all extremities equally, normal muscle tone in all 4 extremities, deep tendon reflexes 2+ symmetrically at the patella      Kajal Sweet MD    Patient Instructions   Dear Neha    It was a pleasure to see you in clinic today. Should you have any questions or concerns, my assistant is Dona / and care coordinator Bouchra and they  can be reached directly at [phone]    Plan discussed at this visit : Your UPT and vaginal infection test came back negative     irregular bleeding most of the time caused by fluctuation in hormone level could be from stress or any other changes in our health and environment , for now as you also intrusted in birth control I will recommend start you on oral contraceptive pills to regulate your cycle and prevent pregnancy  which you can start today       Oral contraceptive Start Advise:      The use of the oral contraceptive has been fully discussed with the patient. This includes the proper method to initiate (i.e. Sunday start after next normal menstrual onset) and continue the pills, the need for regular compliance to ensure adequate contraceptive effect, the physiology which make the pill effective, the instructions for what to do in event of a missed pill, and warnings about anticipated minor side effects such as breakthrough spotting, nausea, breast tenderness, weight changes, acne, headaches, etc. She has been told of the more serious potential side effects such as MI, stroke, and deep vein thrombosis, all of which are very unlikely. She has been asked to report any signs of such serious problems immediately. She should back up the pill with a condom during any cycle in which antibiotics are prescribed, and during the first cycle as well. The need for additional protection, such as a condom, to prevent exposure to sexually transmitted diseases has also been discussed- the patient has been clearly reminded that OCP's cannot protect her  against diseases such as HIV and others. She understands and wishes to take the medication as prescribed.       To start advised:   1. You can start your pills today   . Use a back up form of birth control, (condoms):   During the first week   During any break-through bleeding   During antibiotic use and for an additional week   If you have severe diarrhea or any vomiting within 2 hours of taking your pill, consider it lost and use condoms immediately and to the end of the pack and into your next pack for the 1st week   If you miss taking your pill, use condoms immediately for a week.     3. Please call if you have any questions regarding your pill use.           If you are having any lab work or tests done, our office will contact you with those results in your preferred method of communication.    Feel free to call for any concerns or questions or send us My chart message     Kajal Sweet MD

## 2021-06-15 NOTE — PROGRESS NOTES
Assessment/Plan:     She presents with vaginal irritation and discharge after initiation on any birth control medication.  She has no concerns for sexual transmitted infections.  The discharge appears similar to yeast, and the wet prep demonstrated negative for all pathogens.  With shared decision making, we decided to treat empirically for yeast based on the appearance of the discharge.  Will also refer patient to urogynecology for evaluation of mixed stress and urge incontinence and possible vaginal prolapse.  In addition, will refer to allergy and immunology for allergy testing for urticaria of unknown etiology.  Recommended Benadryl and Zyrtec to treat the itching.    Problem List Items Addressed This Visit     None      Visit Diagnoses     Vaginal discharge    -  Primary    Relevant Orders    Wet Prep, Vaginal (Completed)    Mixed stress and urge urinary incontinence        Relevant Orders    Ambulatory referral to Urology    Urticaria        Relevant Orders    Ambulatory referral to Allergy          AVS printed and given to patient.  Return to clinic PRN.    Total time spent with patient was 20 minutes with greater than 50% spent in face-to-face counseling regarding the above plan.    This note has been dictated using voice recognition software. Any grammatical or context distortions are unintentional and inherent to the the software.     Wadny Nagy MD  Family [facility]      Subjective:      Neha Mcginnis is a 32 y.o. female who presents to clinic for vaginal discharge and irritation.      Patient had experienced approximately 3 months of a nonstop menstrual cycle.  She was seen by another provider and given oral contraceptives.  Since their initiation she has had a cessation of her menses.  Overall she feels well.  However, when she initiated the birth control pills approximately 2 weeks ago, she developed an irritation in the vaginal area.  She suspects she had a yeast infection and she  "did topical Monistat, without any improvement.  She also discusses having mixed stress and urge incontinence and a concern for a \"lumpy bumpy area \"on the anterior part of her vagina.  Patient also describes a rash she has been having on and off for the last 6 months.  She has not noticed any triggers.  She has not been treating it with anything.  She has been unable to determine the cause.  Is always located on her anterior thighs.      Past Medical History, Family History, and Social History reviewed.     Review of systems is as stated in HPI.  The remainder of the 10 system review is otherwise negative.    Objective:     /80  Pulse 74  Ht 5' 1\" (1.549 m)  Wt 156 lb (70.8 kg)  LMP 01/10/2018  SpO2 99%  BMI 29.48 kg/m2 Body mass index is 29.48 kg/(m^2).    Gen: Alert, NAD, appears stated age, normal hygiene   : Patient has normal external anatomy with some hyperemia between the labia majora and minora, but no tenderness to palpation at the location; normal cervical appearance with normal vaginal wall, but with some significant white viscous discharge.  There is also an area of vaginal rugae at the anterior vaginal wall which appears normal to me, but with possible mild prolapse.  Skin: Patient does have urticaria on bilateral anterior thighs which is pruritic      Current Outpatient Prescriptions on File Prior to Visit   Medication Sig Dispense Refill     hydrocortisone (ANUSOL-HC) 2.5 % rectal cream Apply 1-2 times to affected area for no more than 1 week 30 g 0     hydrocortisone 2.5 % cream Use on affected area twice daily 30 g 0     norgestimate-ethinyl estradiol (SPRINTEC, 28,) 0.25-35 mg-mcg per tablet Take 1 tablet by mouth daily. 3 Package 4     azithromycin (ZITHROMAX) 250 MG tablet Take 500 mg (2 x 250 mg tablets) on day 1 followed by 250 mg (1 tablet) on days 2-5. 6 tablet 0     clobetasol (TEMOVATE) 0.05 % cream Apply to affected area BID for up to 2 weeks 30 g 0     clobetasol (TEMOVATE) " 0.05 % external solution APPLY TO SCALP BID PRN FOR SCALING AND ITCHING  11     fluticasone (FLONASE) 50 mcg/actuation nasal spray 2 sprays into each nostril daily. 16 g 2     prenatal vitamin#7-iron-FA-dha 28-1. mg cap Take 1 tablet by mouth daily. 90 each 3     No current facility-administered medications on file prior to visit.

## 2021-06-16 NOTE — TELEPHONE ENCOUNTER
Patient called again to check the status of the order for covid test. Please notify patient when orders have been placed. Thank you

## 2021-06-16 NOTE — TELEPHONE ENCOUNTER
Unable to reach pt. I left a vm. If pt returns call, please let pt know that Dr. Shore is at a C section procedure and has another OB pt in labor. Please help pt r/s appt.

## 2021-06-16 NOTE — PROGRESS NOTES
Neha Mcginnis is a 35 y.o. female who is being evaluated via a billable video visit.      How would you like to obtain your AVS? MyChart.    Will anyone else be joining your video visit? No      Video Start Time: 12:43 PM    Assessment & Plan     Neha was seen today for exposed covid-19 over the weekend.    Diagnoses and all orders for this visit:    Exposure to COVID-19 virus  -     Symptomatic COVID-19 Virus (CORONAVIRUS) PCR; Future  For now, practice self-observation and remain alert for feelings of feverish, cough, shortness of breath.  Take your temperature daily.  Self isolate in the home.  If you have to go out, facemask and avoid public gatherings, public transportation, and maintain distance (6 feet) from others.    Mary Reece MD  [facility]   Neha Mcginnis is 35 y.o. and presents today for the following health issues   HPI     Yesterday she was with a client who has a positive COVID19 test result that was reported today.    Today noted sore throat, shortness of breath, nausea, constipation  No fever  No cough  No loss of taste or smell        Objective       Vitals:  No vitals were obtained today due to virtual visit.    Physical Exam  Constitutional: Patient is oriented to person, place, and time. Patient appears well-developed and well-nourished. No distress.   Head: Normocephalic and atraumatic.   Right Ear: External ear normal.   Left Ear: External ear normal.   Eyes: Conjunctivae and EOM are normal. Right eye exhibits no discharge. Left eye exhibits no discharge. No scleral icterus.   Neurological: Patient is alert and oriented to person, place, and time.  Coordination normal.   Skin: No rash noted. Patient is not diaphoretic. No erythema. No pallor.     Video-Visit Details    Type of service:  Video Visit    Video End Time (time video stopped): 12:48 PM  Originating Location (pt. Location): Home    Distant Location (provider location):    [facility]     Platform used for Video Visit: Glenna

## 2021-06-16 NOTE — TELEPHONE ENCOUNTER
Who is calling:  Patient   Reason for Call:  Patient called to schedule a covid test/order is not placed, per patient her she had a tele visit yesterday and it was dicussed patient is needing to get that done today.  Date of last appointment with primary care: NA  Okay to leave a detailed message: Yes

## 2021-06-16 NOTE — TELEPHONE ENCOUNTER
Telephone Encounter by Aidee Gallardo at 10/2/2019  1:12 PM     Author: Aidee Gallardo Service: -- Author Type: --    Filed: 10/2/2019  1:15 PM Encounter Date: 10/1/2019 Status: Signed    : Aidee Gallardo       OTC Prenatals are not covered.    Here is a list of covered alternatives:

## 2021-06-16 NOTE — PROGRESS NOTES
"Neha Mcginnis is a 36 y.o. female who is being evaluated via a billable telephone visit.      What phone number would you like to be contacted at? [phone]  How would you like to obtain your AVS? AVS Preference: Teri.    Assessment & Plan     Exposure to COVID-19 virus  Repeat test ordered for tomorrow    Paresthesias  Increase gabapentin to 200 mg morning and noon and 200 mg in the evening appointment for neurology made  - Ambulatory referral to Neurology    Abdominal pain, left lower quadrant  Has ovarian pain has been treated for various infections  - US Abdomen Complete               BMI:   Estimated body mass index is 30.71 kg/m  as calculated from the following:    Height as of 7/13/20: 5' 0.83\" (1.545 m).    Weight as of 10/20/20: 161 lb 9.6 oz (73.3 kg).       No follow-ups on file.    Emmett Patel MD  [facility]    Aziza Mcginnis is 36 y.o. 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  Tested 48 hours ago negative symptomatic now for 3 days needs repeat test no work tomorrow until test back we will order PCR through [place].  She is also got worsening paresthesias of her hands and feet she is on gabapentin 100 mg twice daily 200 mg at night; will increase her dosage to 200 mg 3 times daily.  Appointment with neurology made for definitive diagnosis.  Thirdly she is having bilateral ovarian pain she is requesting ultrasound abdomen history reviewed she has had multiple apparent gynecological infections we will order this test she will follow-up with Dr. Shore here in the clinic        Review of Systems  Negative except for complaints today      Objective       Vitals:  No vitals were obtained today due to virtual visit.    Physical Exam  Deferred this was a telephone exam            Phone call duration: 15 minutes  "

## 2021-06-17 NOTE — TELEPHONE ENCOUNTER
Telephone Encounter by Susi Thompson, Diabetes Ed at 4/2/2020  2:41 PM     Author: Susi Thompson, Diabetes Ed Service: -- Author Type: Diabetes Ed    Filed: 4/2/2020  2:42 PM Encounter Date: 3/26/2020 Status: Attested    : Susi Thompson, Diabetes Ed (Diabetes Ed) Cosigner: Haily Shore MD at 4/2/2020  3:23 PM    Attestation signed by Haily Shore MD at 4/2/2020  3:23 PM    Agree with note as stated                Spoke with Es.  Dr. Zamorano's office will be managing Neha's GDM.  Called Neha and DICK to inform.  Call back if questions.

## 2021-06-18 NOTE — PATIENT INSTRUCTIONS - HE
Patient Instructions by Amparo Mendoza Scribe at 3/12/2020 11:30 AM     Author: Amparo Mendoza Scribe Service: -- Author Type: Eric    Filed: 3/12/2020 11:41 AM Encounter Date: 3/12/2020 Status: Signed    : Amparo Mendoza Scribe (Eric)         Patient Education   HEALTHY PREGNANCY CARE: 26-30 WEEKS PREGNANT    You are now in your last trimester of pregnancy. Your baby is growing rapidly, can open and close her eyelids, sometimes get hiccups, and you'll probably feel her moving around more often. Your baby has breathing movements and is gaining about one ounce each day. You may notice heartburn and leg cramps. Your back may ache as your body gets used to your baby's size and length.    Discuss your work situation with your midwife or physician as needed. If you stand for long periods of time, you may need to make changes and take breaks.    Pre-register online at the hospital where your baby will be born ([email or web address])     Be aware of your baby's activity level. You may be asked to do daily fetal movement counts. Contact your midwife or physician about any decreased movement.    You may have been tested for gestational diabetes today. If you are RH negative, you may have had an additional test and a Rhogam injection.    Consider receiving a Tdap vaccination to protect your baby from Pertussis/whooping cough.    If you are considering tubal ligation, discuss this with your midwife or physician. You will need to have an appointment with the obstetrician who will do the surgery to discuss the risks, benefits, and alternatives, and to sign the consent. This can be discussed at your next visit.    Continue to watch for any signs or symptoms of premature labor:     Regular contractions. This means having about 6 or more within 1 hour, even after you have had a glass of water and are resting.     A backache that starts and stops regularly.    An increase or  "change in vaginal discharge, such as heavy, mucus-like, watery, or bloody discharge.     Your water breaks or leaks.    Continue to watch for signs and symptoms of preeclampsia:     Sudden swelling of your face, hands, or feet     New vision problems such as blurring, double vision, or flashing lights    A severe headache not relieved with acetaminophen (Tylenol)    Sharp or stabbing pain in your right or middle upper abdomen    If you have any of the above symptoms or any other concerns, call your midwife or physician or their clinic staff at [facility] at Phone: [phone]. If it's after clinic hours, physician patients should call the [facility] at [phone] ([phone]); midwife patients should call their answering service at [phone].    How can you care for yourself at home?   You can refer to the Starting Out Right book or find it online at [email or web address] or [email or web address]     You can sign up for a weekly parenting e-mail that gives support, tips and advice from health care professionals that starts with pregnancy and continues through the toddler years. To register, go to [email or web address] at any time during your pregnancy.    BREASTFEEDING TIPS FOR NEW MOMS     Importance of skin-to-skin contact:  ? Gets breastfeeding off to a good start  ? Keeps baby warm and is great for bonding  ? Provides calming effects and helps to stabilize breathing and  blood sugar  ? Helps the uterus to contract and bleed less    Importance of feeding whenever baby shows signs of  being hungry, \"feeding on cue\":  ? Helps create a good milk supply appropriate to the babys needs  ? Less breastfeeding complications such as engorgement or  low supply  ? Helps baby get enough milk  ? Milk supply is determined by how often baby nurses " and empties  the breast.  ? Feeding is for comfort as well as nutrition    Importance of good latch (positioning and attaching  baby properly at breast):  ? Helps prevent sore nipples  ? Helps ensure baby gets enough milk and supports moms breast  milk supply    Risks of giving baby supplements other  than moms breastmilk  Breastfeeding alone is recommended for the first 6 months, if not it:  ? Can make baby more prone to illness  ? Can make baby less satisfied at breast (wanting larger amounts or  faster flow)  ? Reduces milk supply    Importance of rooming-in:  ? Increases parent confidence while mother is supported by the  hospital staff  ? Caregivers learn how to care for baby and recognize feeding cues  ? Enables feeding whenever baby needs to eat  ? Baby is comforted with mom and learns to recognize caregivers    Avoiding use of pacifiers:  ? Use of pacifiers in the first weeks can make it difficult to make a full  milk supply  ? May interfere with baby learning to latch well      2017 [facility] [id number]. SW [id number]. DOD 11.17           Breastfeeding   Why Its Important and What to Expect     Your breast milk is the best food for your baby--and  breastfeeding can help you be healthy as well.    Though breastfeeding is natural, it is a learned process for both mother and baby. To prepare, there are things you can learn--and do--before your baby is born.    ? Learn the benefits of breastfeeding.    ? Understand the basic process.    ? Know what to expect in the hospital.    ? Arrange breastfeeding support for the first few  weeks after birth.    ? Take a breastfeeding class (see back page).    ? Talk to your midwife, nurse or doctor if you have  Questions.    The benefits of breastfeeding    Human milk changes to meet the needs of a growing baby. It is all a baby needs for the first six months of life.    In fact, babies who receive only human milk for the  first six months are less likely to develop colds, the  flu,  colic, asthma, ear infections, food allergies and  diarrhea (loose, watery stools). They may be less likely      to be overweight as children, and they are less likely to develop diabetes later in life. Some studies also show that infants have a higher IQ if they are .    Breastfeeding can:    ? Help you and your baby develop a special bond--  and make you feel proud that you can feed your  Baby.    ? Reduce the total amount of blood you will lose  after delivery.    ? Help your uterus return to its non-pregnant size.    ? Reduce the risk of Sudden Infant Death Syndrome (SIDS).    ? Help you lose your pregnancy weight more quickly.    ? Help delay the return of your monthly periods.    ? Lower your risk of some breast and ovarian  cancers--as well as osteoporosis (bone loss)--later in life.    ? Save you more than $300 per month. (This  includes the cost of formula and medical bills.  Formula-fed babies get sick more often.)          If you are deaf or hard of hearing, please let us know. We provide many free services including  sign language interpreters, oral interpreters, TTYs, telephone amplifiers, note takers and written materials.        How to breastfeed    Skin-to-skin contact    Hold your baby on your chest skin-to-skin right after  birth. Skin contact calms your baby, steadies their  breathing and keeps your baby warm. Your baby will be alert and will likely want to feed within the first hour after birth.    Babies are born with reflexes that help them  breastfeed. Your body will be ready with early milk  (called colostrum), so you will have all the milk your  baby needs for that first feeding. Your nurse will help you get started.    Keep your baby with you and breastfeed whenever  your baby is hungry. Offering the breast early and  often helps your body keep making lots of milk.    How to position your baby    There are many positions for breastfeeding.              No matter which position you  choose, support your  babys back, shoulders and neck. The head and body should be in a straight line, and the entire body should face the breast. Your baby should be able to tilt the head back easily. Your baby shouldnt have to reach out to feed. Also make sure your babys nose is level with your nipple. This way, your baby will find it easier to attach to your breast.    Finally, get comfortable. Use pillows to support your  body. Dont lean over or slump to reach your baby.  Once your baby is attached to the breast, its okay to change your position slightly.    You will feel a bit of a tugging at first, but you should  never feel pain. If you do, ask your nurse or lactation expert for help. She will teach you how to latch your baby onto the breast in a way that feels more comfortable.    Breastfeeding in the hospital    For the first three days after birth, your body will  produce early milk called colostrum. This milk is  full of calories and antibodies to help keep your baby healthy. It is all your baby needs for the first few days.    Remember, babies do not eat anything while inside  you. Right after birth, your baby will only need a little bit of milk (about 1 teaspoon per feeding) to get the digestive system working well. Your baby will not need any formula--your body will make the right amount of milk.    Breastfeed whenever your baby shows signs of hunger. (See next page for a list of signs.) Crying is a late sign of hunger.    Babies often lose weight in the days after birth. This  is normal. By two weeks of age, your baby should be back to their birth weight. Your care team will watch your babys weight carefully.    If you are unable to breastfeed in the hospital, your  care team may suggest pasteurized donor human milk for your baby.             The Most Important Points to Remember    ? Your breast milk is the perfect food for your  baby. Breastfeeding has a lot of health benefits  for you as well.    ?  Hold your baby skin-to-skin as soon as possible  after birth. Do this for as long as you can. Even  if your baby doesnt go to the breast right away,  skin-to-skin contact helps your body make  more milk. This lets you get an early start on  Breastfeeding.    ? Learn how to position your baby at the breast.  This will help your baby feed well, and it will  keep you comfortable.    ? Feed your baby whenever your baby wants to  eat. You are feeding a baby, not a clock!    ? Signs that your baby is ready to eat include:  starting to wake up, chewing on fists, moving  the face from side to side, opening and closing  the mouth, sticking out the tongue and turning  toward the breast when held. Crying is a late  sign of hunger, so look for the earlier signals  that your baby makes.    ? Feed your baby only human milk for at least  six months. The World Health Organization  recommends breastfeeding for the first year,  noting it is a yolanda baby who is  for  the first two years.    ? While in the hospital, plan to keep your baby  with you at all times, except for certain medical  procedures. This is an important time for you  and your baby to get to know each other and  practice breastfeeding.     Common questions    Below are questions that many women have about  breastfeeding. You will find further information in the  childbirth book your care team gave you. If you have more questions, speak with your midwife, nurse or doctor.    How do I involve my partner, family and  friends and get their help and support?    Sometimes family and friends dont understand why  you want to breastfeed. Perhaps they themselves  didnt breastfeed, or they werent  as infants. Tell them about the benefits of breastfeeding and how important it is to feed only human milk for the first six months or so. If you feed often, you will make plenty of milk to help your baby grow, fight illness and get the best start possible.    After  two to four weeks--once your baby is feeding  well and your milk supply is well established--your  partner and others can feed the baby your milk from  a cup, dropper or bottle. You can remove milk from  your breast (by hand or pump) and store it for later  use. This way, your baby will have your milk even  when youre away.    Remind everyone that there are lots of ways to help  that dont involve feeding: making meals, caring for  your other children, comforting the baby if they cries, changing diapers, running errands and more.    Is breastfeeding painful?    Not usually. There are ways to prevent pain and to  treat it if it happens.    The best ways to avoid pain are to feed your baby  often, use a good position and correctly latch your  baby onto the breast. These help prevent the two  most common sources of pain: sore nipples and  engorgement (overly full breasts). You will learn more about these topics in a breastfeeding class and in the hospital after your baby is born.         How much time does it take to breastfeed?    Some new mothers feel that all they do is breastfeed. In the early weeks, a baby eats 8 to 12 times per day. Sometimes babies will cluster feedings close together. At other times, there are longer stretches between feedings.    Remember, your newborns stomach will be about  the size of a walnut. Your baby wont eat much at each feeding. If you feed your baby often in the first days, your baby--and your milk supply--will grow. Your baby will eat more at each session, and you will need to nurse less often. Within a few weeks, most women find that breastfeeding is easier and takes less time than formula feeding.    How will I know if my baby is getting  enough milk?    Your body will start making milk as soon as your  baby is born. The more often you put your baby to  breast, the more milk you will make. You should avoid pacifiers for the first several weeks until breastfeeding is well  established--you want your baby to do all their sucking at the breast. This will help you make more milk.    There are signs that your baby is getting enough milk. You will learn these signs over time. For example:    ? You will count wet and soiled diapers, because  these show how much milk your baby is getting.    ? Your baby will seem satisfied after feedings.    ? Your baby will grow and gain weight after the first  few days.    Are there reasons why a woman shouldnt  breastfeed her baby?    There are a few medical concerns that prevent  breastfeeding. In mothers, these include being HIVpositive, having active or untreated TB (tuberculosis)  and using street drugs or some medicines. These  mothers usually choose infant formula for their  Babies.    A few women choose not to breastfeed for personal  reasons. But most mothers can breastfeed. If you are not sure if its okay to breastfeed, ask your care team.    Getting support    Before your baby is born, get as much information  as you can. Sign up for a breastfeeding class. Most  childbirth classes discuss breastfeeding, but a special class will give more in-depth information.    ? In [facility]: Go to [facility] at [employer].    ? In [facility]: Go to [email or web address].  Click on Classes-and Events at the bottom of the  page, then search for breastfeeding. Or, call [phone].    Remember, help is available from your hospital, clinic, lactation experts or online. If you need help after leaving the hospital:    ? Call your babys clinic. (If you dont have a clinic,  call [phone] and ask for a referral.)    ? Call a lactation consultant. (If you need help  finding a location that offers lactation support, call  [phone].)    ? Call [employer] (24 hours a  day) at 8-374-NGNPR [1-130.203.5916].    ? Call the Women, Infants and Children (WIC)  program at 1-962.127.6143.    ? Call the National  Womens Health Information  Center (English and Gabonese) at 1-926.165.3074 or  go to www.womenshealth.gov/breastfeeding.    ? Go to [employer].     For informational purposes only. Not to replace the advice of your health care provider. Copyright   2010 [facility]. All rights reserved. Clinically reviewed by [facility]. POI 144842 - REV 06/18.

## 2021-06-19 NOTE — LETTER
Letter by Haily Shore MD at      Author: Haily Shore MD Service: -- Author Type: --    Filed:  Encounter Date: 10/1/2019 Status: Signed         October 1, 2019     Patient: Neha Mcginnis   YOB: 1985   Date of Visit: 9/30/2019       To Whom It May Concern:    Neha Mcginnis is currently pregnant with an LMP of 8/24/2019 and EDC of 5/30/2020.       If you have any questions or concerns, please don't hesitate to call.    Sincerely,        Electronically signed by Haily Shore MD

## 2021-06-19 NOTE — LETTER
Letter by Lyly Carver CNP at      Author: Lyly Carver CNP Service: -- Author Type: --    Filed:  Encounter Date: 8/7/2019 Status: (Other)         Neha Mcginnis  [address] [facility] [address]             August 7, 2019         Dear Ms. Mcginnis,    Below are the results from your recent visit:    Resulted Orders   Glycosylated Hemoglobin A1c   Result Value Ref Range    Hemoglobin A1c 5.5 3.5 - 6.0 %   Basic Metabolic Panel   Result Value Ref Range    Sodium 142 136 - 145 mmol/L    Potassium 3.8 3.5 - 5.0 mmol/L    Chloride 108 (H) 98 - 107 mmol/L    CO2 28 22 - 31 mmol/L    Anion Gap, Calculation 6 5 - 18 mmol/L    Glucose 78 70 - 125 mg/dL    Calcium 9.5 8.5 - 10.5 mg/dL    BUN 9 8 - 22 mg/dL    Creatinine 0.65 0.60 - 1.10 mg/dL    GFR MDRD Af Amer >60 >60 mL/min/1.73m2    GFR MDRD Non Af Amer >60 >60 mL/min/1.73m2    Narrative    Fasting Glucose reference range is 70-99 mg/dL per  American Diabetes Association (ADA) guidelines.   Thyroid Stimulating Hormone (TSH)   Result Value Ref Range    TSH 0.60 0.30 - 5.00 uIU/mL   T4, Free   Result Value Ref Range    Free T4 0.9 0.7 - 1.8 ng/dL       Your thyroid labs and kidney function are normal. You do not have diabetes.     Please call with questions or contact us using [employer].    Sincerely,        Electronically signed by Lyly Carver CNP

## 2021-06-19 NOTE — PROGRESS NOTES
ASSESSMENT/PLAN:  1. Anemia, iron deficiency  33-year-old with known history of iron deficiency anemia.  There does not appear to be much iron in her diet and her symptoms of feeling tired have actually improved and she is added an iron supplement.  We will recheck blood counts and a ferritin level today.  - HM2(CBC w/o Differential)  - Ferritin    2. Loss of weight  She has been trying to lose weight and eating more healthy however she has had more weight loss than what she would have anticipated.  We will do with a couple additional labs including CMP and hemoglobin A1c with her history of having gestational diabetes.  - Glycosylated Hemoglobin A1c  - Comprehensive Metabolic Panel    3. Goiter  Known history of goiter and previously euthyroid.  Will check levels today and since she feels that her thyroid is increasing in size we will also recheck a thyroid ultrasound.  - Thyroid Stimulating Hormone (TSH)  - T4, Free  - T4, Total  - T3, Total  - US Thyroid; Future      Patient Instructions   Labs done today, results will be available via [employer].    Schedule a thyroid ultrasound at your earliest convenience.      Orders Placed This Encounter   Procedures     US Thyroid     Standing Status:   Future     Standing Expiration Date:   8/20/2019     Order Specific Question:   Reason for Exam (Describe Symptoms):     Answer:   goiter, now enlarging     Order Specific Question:   Is the patient pregnant?     Answer:   No     Order Specific Question:   Can the procedure be changed per Radiologist protocol?     Answer:   Yes     HM2(CBC w/o Differential)     Ferritin     Thyroid Stimulating Hormone (TSH)     T4, Free     T4, Total     T3, Total     Glycosylated Hemoglobin A1c     Comprehensive Metabolic Panel     Medications Discontinued During This Encounter   Medication Reason     azithromycin (ZITHROMAX) 250 MG tablet Therapy completed     clobetasol (TEMOVATE) 0.05 % cream Therapy completed     clobetasol (TEMOVATE) 0.05  % external solution Therapy completed     fluconazole (DIFLUCAN) 150 MG tablet Therapy completed     fluticasone (FLONASE) 50 mcg/actuation nasal spray Therapy completed     hydrocortisone (ANUSOL-HC) 2.5 % rectal cream Therapy completed     norgestimate-ethinyl estradiol (SPRINTEC, 28,) 0.25-35 mg-mcg per tablet      prenatal vitamin#7-iron-FA-dha 28-1. mg cap Therapy completed       No Follow-up on file.    CHIEF COMPLAINT;  Chief Complaint   Patient presents with     Follow-up     fatigue, hgb from derm and fu thyroid       HISTORY OF PRESENT ILLNESS:  Neha is a 33 y.o. female presenting to the clinic today for fatigue. She is not fasting today.     Thyroid Concerns: She endorses hair loss, muscle twitching, unintentional weight loss, fatigue, and fluctuation in energy levels. She increased her iron supplement and has made dietary changes. She has noticed some increase in her energy levels. She feels that she sleeps for long enough but does not feel well-rested. She was seen by dermatology for worsening hair loss on 8/7/18 and her TSH was 0.38. She denies difficulty swallowing, fevers, chills, chest pain, shortness of breath, DON, new rashes, or abdominal pain. She is not eating red meat. She has cut carbohydrates. She has increased her fruit and vegetable intake.     REVIEW OF SYSTEMS:  All other systems are negative.    PFSH:  She has a history of gestational diabetes. Reviewed, as below.    TOBACCO USE:  History   Smoking Status     Former Smoker   Smokeless Tobacco     Never Used     Comment: quit 2006       VITALS:  Vitals:    08/20/18 1114   BP: 114/80   Pulse: 74   Resp: 16   Weight: 146 lb (66.2 kg)     Wt Readings from Last 3 Encounters:   08/20/18 146 lb (66.2 kg)   01/24/18 156 lb (70.8 kg)   01/17/18 156 lb (70.8 kg)     Body mass index is 27.59 kg/(m^2).    PHYSICAL EXAM:  GENERAL APPEARANCE: Alert, cooperative, no distress, appears stated age  HEAD: Normocephalic, without obvious abnormality,  atraumatic  NECK: Supple, symmetrical, trachea midline, no adenopathy;    thyroid:  No enlargement/tenderness/nodules  LUNGS: Clear to auscultation bilaterally, respirations unlabored  HEART: Regular rate and rhythm, S1 and S2 normal, no murmur, rub or gallop  NEUROLOGIC: CNII-XII intact.    RECENT RESULTS  No results found for this or any previous visit (from the past 48 hour(s)).      ADDITIONAL HISTORY SUMMARIZED (2): None.  DECISION TO OBTAIN EXTRA INFORMATION (1): None.  RADIOLOGY TESTS (1): US thyroid ordered. US thyroid 9/17/15 reviewed, equivocal mild coarsening of the thyroid parenchymal echotexture, no focal nodule.  LABS (1): Labs ordered today.  MEDICINE TESTS (1): None.  INDEPENDENT REVIEW (2 each): None.    The visit lasted a total of 7 minutes face to face with the patient. Over 50% of the time was spent counseling and educating the patient about thyroid concerns.    IFlakita, am scribing for and in the presence of, Dr. Shore.    I, Dr. Shore, personally performed the services described in this documentation, as scribed by Flakita Malone in my presence, and it is both accurate and complete.    Dragon dictation was used for this note.  Speech recognition errors are a possibility.    MEDICATIONS:  Current Outpatient Prescriptions   Medication Sig Dispense Refill     ferrous sulfate 325 (65 FE) MG tablet Take 1 tablet by mouth 3 (three) times a day.       hydrocortisone 2.5 % cream APPLY EXTERNALLY TO THE AFFTECTED AREA TWICE DAILY 30 g 0     No current facility-administered medications for this visit.        Total data points: 2

## 2021-06-20 NOTE — LETTER
Letter by Ana Llamas at      Author: Ana Llamas Service: -- Author Type: --    Filed:  Encounter Date: 6/3/2020 Status: (Other)         Nancy 3, 2020       Neha Mcginnis  [address] [place] [address]    Dear Neha Mcginnis:    We are pleased to provide you with secure, online access to medical information for you and your family within [facility]. Per your request, we have expanded your account to allow access to the records of the following family members:                      Analisa La (privilege ends on 5/25/2032.)     How Do I Log In?  1. In your Internet browser, go to [email or web address]  2. Log into [facility] using your [facility] Username and Password.  3. Click Sign In.        How Do I Access a Family Member's Account?  4. Select the account you want to access by clicking the [place] with the appropriate patient's name at the top of your screen.   5. You will see a disclaimer page letting you know that you will be viewing a family member's record. Review the disclaimer and then click Accept Proxy Access Disclaimer to proceed.  6. Once you switch to viewing a family member's record, you can navigate to [facility] pages the same way you would for yourself. You can return to your own account by clicking the [place] at the top of the screen with your name on it.    7. To customize the colors and names of the linked accounts, you can select Personalize from the Profile dropdown menu at the top of the screen, then click the Edit button to make changes.     Additional Information  If you have questions, visit [email or web address], e-mail [email or web address] or call [phone] to talk to our [facility] staff. Remember, [facility] is NOT to be used for urgent needs. For medical emergencies, dial 911.

## 2021-06-22 NOTE — PROGRESS NOTES
ASSESSMENT:  1. Missed menses  Pregnancy (Beta-hCG, Qual), Urine   2. Change in consistency of stool  Ova and Parasite, Stool    Ova and Parasite, Stool    Ova and Parasite, Stool    Culture, Stool       PLAN:  Stool studies today to reassure the patient that no parasites are present given stool change in consistency change.  All sounds very tympanic so suspect some constipation and gas buildup.  We will see if regimen outlined below helps.  Pregnancy test negative today.  No problem-specific Assessment & Plan notes found for this encounter.      Patient Instructions   Collect stool samples. Bring back to lab.    Miralax 17 g daily until stool soft.     Gas x on a regular basis as directed.    Let us know if not improving.      Orders Placed This Encounter   Procedures     Ova and Parasite, Stool     Standing Status:   Future     Standing Expiration Date:   12/28/2019     Ova and Parasite, Stool     Standing Status:   Future     Standing Expiration Date:   12/28/2019     Ova and Parasite, Stool     Standing Status:   Future     Standing Expiration Date:   12/28/2019     Culture, Stool     Standing Status:   Future     Standing Expiration Date:   12/28/2019     Pregnancy (Beta-hCG, Qual), Urine     There are no discontinued medications.    No Follow-up on file.    CHIEF COMPLAINT:  Chief Complaint   Patient presents with     Problem Visit     2 wks fluttering feeling in lower abdominal cavity, dry skin, hair loss       HISTORY OF PRESENT ILLNESS:  Neha is a 33 y.o. female here today for evaluation of fluttering feeling in her abdomen.  This is been present for 2 weeks.  States that it feels like she is pregnant.  Her periods have been mostly regular but she would like a pregnancy test today just in case.  It does feel similar to when she has been pregnant in the past.  She has not had any changes to bowel habits except stools have become more regular.  He has been on multiple vitamin been going out 3 times per week  which is an improvement.  She is not passing very much gas.  She does not have any abdominal pain but has some bloating bloating or distention on and off.  No upper GI symptoms, no blood in the stool.  No changes to periods or pelvic pain.  Patient recently ate a lot of sushi, she is concerned about parasites given the stool consistency change and new symptoms.  Would like to rule this out today.    REVIEW OF SYSTEMS:        All other systems are negative  PFSH:  Reviewed, no changes      TOBACCO USE:  Social History     Tobacco Use   Smoking Status Former Smoker   Smokeless Tobacco Never Used   Tobacco Comment    quit 2006       VITALS:  Vitals:    12/28/18 1652   BP: 134/84   Patient Site: Left Arm   Patient Position: Sitting   Cuff Size: Adult Regular   Pulse: 63   Resp: 16   Temp: 98  F (36.7  C)   TempSrc: Oral   SpO2: 96%   Weight: 142 lb (64.4 kg)     Wt Readings from Last 3 Encounters:   12/28/18 142 lb (64.4 kg)   08/20/18 146 lb (66.2 kg)   01/24/18 156 lb (70.8 kg)       PHYSICAL EXAM:   /84 (Patient Site: Left Arm, Patient Position: Sitting, Cuff Size: Adult Regular)   Pulse 63   Temp 98  F (36.7  C) (Oral)   Resp 16   Wt 142 lb (64.4 kg)   SpO2 96%   BMI 26.83 kg/m    General appearance: alert, appears stated age and cooperative  Lungs: clear to auscultation bilaterally  Heart: regular rate and rhythm, S1, S2 normal, no murmur, click, rub or gallop  Abdomen: abnormal findings:  distended, hypoactive bowel sounds and Sounds tympanic abdomen her bowel sounds  Neurologic: Grossly normal    DATA REVIEWED:  Additional History from Old Records Summarized (2): 0  Decision to Obtain Records (1): 0  Radiology Tests Summarized or Ordered (1): 0  Labs Reviewed or Ordered (1): 1  Medicine Test Summarized or Ordered (1): 0  Independent Review of EKG or X-RAY(2 each): 0    The visit lasted a total of 25 minutes face to face with the patient. Over 50% of the time was spent counseling and educating the  patient about plan of care.    MEDICATIONS:  Current Outpatient Medications   Medication Sig Dispense Refill     ferrous sulfate 325 (65 FE) MG tablet Take 1 tablet by mouth 3 (three) times a day.       hydrocortisone 2.5 % cream APPLY EXTERNALLY TO THE AFFTECTED AREA TWICE DAILY 30 g 0     polyethylene glycol (MIRALAX) 17 gram packet Take 1 packet (17 g total) by mouth daily. 14 each 0     No current facility-administered medications for this visit.        This note has been dictated using voice recognition software. Any grammatical or context distortions are unintentional and inherent to the software

## 2021-06-25 NOTE — PROGRESS NOTES
ASSESSMENT/PLAN:  1. Paresthesias  35 yo female with new paresthesias of unclear etiology. Labs including heavy metal screening have been negative.  She is seeing neurology.  She would like to see a functional medicine doctor for further evaluation in addition.  I recommend Dr. Galaviz.  Gapapentin is helpful for symptoms and rx is sent to the pharmacy.  - gabapentin (NEURONTIN) 100 MG capsule; TAKE 2 CAPSULES BY MOUTH EVERY MORNING AND 2 CAPSULES BY MOUTH EVERY AFTERNOON AND 2 CAPSULES BY MOUTH EVERY NIGHT AT BEDTIME  Dispense: 360 capsule; Refill: 2      Dragon dictation was used for this note.  Speech recognition errors are a possibility.    No follow-ups on file.  There are no Patient Instructions on file for this visit.    No orders of the defined types were placed in this encounter.    Medications Discontinued During This Encounter   Medication Reason     gabapentin (NEURONTIN) 100 MG capsule Reorder         CHIEF COMPLAINT;  Chief Complaint   Patient presents with     Follow-up     u/s results and would like to f/u on a medication: gabapentin       HISTORY OF PRESENT ILLNESS:  Neha is a 36 y.o. female presenting to the clinic today for ongoing paresthesias.  She experiencing numbness and tingling into her legs.  It has been present for many months now.  No injuries or back pain.  Lab work up for common causes including B12, D, heavy metals and liver enzymes have been negative. She is taking a B Complex supplement without improvement.  Gapapentin is helpful. She has history of GDM however last A1c was 5.3. Recent  pelvic US were normal. She saw endocrine with her history of subclinical hypothyroidism and felt thyroid was not contributing.    Remainder of 12-point ROS is negative.    TOBACCO USE:  Social History     Tobacco Use   Smoking Status Former Smoker   Smokeless Tobacco Never Used   Tobacco Comment    quit 2006       VITALS:  Vitals:    05/27/21 1127   BP: 120/80   Patient Site: Right Arm   Patient  Position: Sitting   Cuff Size: Adult Regular   Pulse: 66   SpO2: 99%   Weight: 141 lb (64 kg)     Wt Readings from Last 3 Encounters:   05/27/21 141 lb (64 kg)   10/20/20 161 lb 9.6 oz (73.3 kg)   10/02/20 165 lb (74.8 kg)     Body mass index is 26.79 kg/m .    PHYSICAL EXAM:  GENERAL APPEARANCE: Alert, cooperative, no distress, appears stated age  NEUROLOGIC: CNII-XII intact. Normal strength, sensation and reflexes       throughout    RECENT RESULTS  No results found for this or any previous visit (from the past 48 hour(s)).    MEDICATIONS:  Current Outpatient Medications   Medication Sig Dispense Refill     acetaminophen (TYLENOL) 325 MG tablet Take 1-2 tablets (325-650 mg total) by mouth every 4 (four) hours as needed.  0     ascorbic acid, vitamin C, (VITAMIN C) 1000 MG tablet Take 1,000 mg by mouth daily.       cetirizine (ZYRTEC) 10 MG tablet Take 1 tablet (10 mg total) by mouth 2 (two) times a day as needed for allergies. 90 tablet 3     fluticasone propionate (FLONASE) 50 mcg/actuation nasal spray 1 spray into each nostril 2 (two) times a day. 16 g 3     gabapentin (NEURONTIN) 100 MG capsule TAKE 2 CAPSULES BY MOUTH EVERY MORNING AND 2 CAPSULES BY MOUTH EVERY AFTERNOON AND 2 CAPSULES BY MOUTH EVERY NIGHT AT BEDTIME 360 capsule 2     VITAMIN B-6 25 MG tablet        No current facility-administered medications for this visit.
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
## 2021-06-25 NOTE — PATIENT INSTRUCTIONS - HE
You can take ibuprofen up to 800 mg three times daily as needed.    You can use ice as well.     The muscle relaxer may help improve your sleep.     I encourage you to continue seeing a chiropractor.    Referral for Allied Physical Therapy given.    Healing can take up to 6 months.     We can consider advanced imaging if your pain is getting worse or if the conservative treatments are not working.

## 2021-06-28 NOTE — PROGRESS NOTES
Progress Notes by Susi Thompson, Diabetes Ed at 4/2/2020 11:00 AM     Author: Susi Thompson, Diabetes Ed Service: -- Author Type: Diabetes Ed    Filed: 4/2/2020  2:58 PM Encounter Date: 4/2/2020 Status: Attested    : Susi Thompson, Diabetes Ed (Diabetes Ed) Cosigner: Haily Shore MD at 4/2/2020  3:23 PM    Attestation signed by Haily Shore MD at 4/2/2020  3:23 PM    Reviewed note and personally spoke with Adamaris Thompson regarding plan, agree with plan as outlined, Dr. Zamorano to manage                   [facility] Gestational Diabetes Care Plan    Assessment: Spoke with Neha this morning via telephone for her Gestational Diabetes visit.  She was started on insulin by an endocrinologist she has been seeing through the [facility].  She is taking 8 units of NPH.  Stated she was told to start taking 4 units, but this did not seem to impact her blood sugars at all.  Her readings over the last several days are noted below as well as her insulin doses:    F-107/86/107/88  B-185-6 units/95-egg bites/151/123-8 units  L-140-8 units/134  D-148/147/151-8 units  She did also take 8 units of insulin with 1 and 1/2 cupcakes she ate for a snack on Monday  Some days she eats lunch that is provided at work and some days she packs her lunch.  When she brings her lunch, her readings are lower.  Reviewed how NPH insulin works and how it is usually taken.    Did reach out to Dr. Zamorano's office and stated they will continue to manage her GDM and insulin.  Did reach out to patient to inform of this, left message to call back if questions.    Plan: Neha has a video appointment tomorrow morning.  Discussed with Tish from the office and they will clarify her insulin protocol.    Provider: Silviano/Yakov  Provider's Diagnosis (per referral form) Gestational Diabetes (648.83)    Weight:    OGTT: 1 hour-163  EDC: Estimated Date of Delivery: 6/4/20  Pregnancy number: 4  Previous GDM: Yes    Medications:    Current Outpatient Medications:   ?  blood glucose meter (ONETOUCH ULTRA SYSTEM KIT), Test daily before all meals/snacks and once before bedtime., Disp: 1 each, Rfl: 0  ?  blood glucose test (ONETOUCH ULTRA TEST) strips, Test daily before all meals/snacks and once before bedtime., Disp: 200 strip, Rfl: 1  ?  ferrous sulfate 325 (65 FE) MG tablet, Take 1 tablet (325 mg total) by mouth daily with breakfast., Disp: 60 tablet, Rfl: 3  ?  generic lancets (ONETOUCH ULTRASOFT), Test daily before all meals/snacks and once before bedtime., Disp: 200 each, Rfl: 0  ?  insulin NPH (NOVOLIN) 100 unit/mL injection, Inject 8 Units under the skin. Currently taking 6-8 units before some meals, Disp: , Rfl:   ?  prenatal vit 10-iron-folic-dha (VITAFOL-OB+DHA) 65-1-250 mg Cmpk, Take 1 tablet by mouth daily., Disp: 90 each, Rfl: 3  ?  cetirizine (ZYRTEC) 10 MG tablet, Take 1 tablet (10 mg total) by mouth 2 (two) times a day as needed for allergies., Disp: 90 tablet, Rfl: 3  ?  docusate sodium (COLACE) 100 MG capsule, Take 1 capsule (100 mg total) by mouth 2 (two) times a day., Disp: 60 capsule, Rfl: 3  ?  fluticasone (VERAMYST) 27.5 mcg/actuation nasal spray, 2 sprays per nostril daily, Disp: 10 g, Rfl: 12  ?  fluticasone propionate (FLONASE) 50 mcg/actuation nasal spray, 1 spray into each nostril 2 (two) times a day., Disp: 16 g, Rfl: 3  ?  hydrocortisone 2.5 % cream, APPLY EXTERNALLY TO THE AFFTECTED AREA TWICE DAILY as needed, Disp: 28 g, Rfl: 2  ?  loratadine (CLARITIN) 10 mg tablet, Take 1 tablet (10 mg total) by mouth daily., Disp: 30 tablet, Rfl: 2  ?  metoclopramide (REGLAN) 5 MG tablet, Take 1 tablet (5 mg total) by mouth 3 (three) times a day., Disp: 30 tablet, Rfl: 1  ?  oxymetazoline (AFRIN) 0.05 % nasal spray, 2 sprays into each nostril 2 (two) times a day., Disp: 15 mL, Rfl: 2  ?  PNV,calcium 72-iron-folic acid 27 mg iron- 1 mg Tab, Take 1 tablet by mouth daily., Disp: 90 tablet, Rfl: 3  ?  prenat75-iron fum-folic  ac-om3 (ONE A DAY WOMEN'S PRENATAL DHA) 28 mg iron- 800 mcg Cmpk, Take 1 tablet by mouth daily., Disp: 90 each, Rfl: 4  ?  pyridoxine, vitamin B6, (B-6) 25 MG tablet, Take 1 tablet (25 mg total) by mouth daily., Disp: 60 tablet, Rfl: 3  PNV: Yes  Supplements: Yes    BG monitoring goals: Fasting <95; 1 hour post start of meal <140. Test 4 x per day.  Check fasting a.m. ketones: No  GDM meal pattern/carb counting taught per guidelines: Yes    Time: 30 minutes DSMT  Visit Type: GDM Individual Follow-up  Visit #: 1      Susi Thompson  4/2/2020    DIABETES CARE PLAN AND EDUCATION RECORD    Gestational Diabetes Disease Process/Preconception Care/Management During Pregnancy/Postpartum:Assessed and Discussed    Meter (per above goals): Assessed and Discussed    Nutrition Management    Weight: Assessed and Discussed  Portions/Balance: Assessed and discussed  Carb ID/Count: Assessed and discussed  Label Reading: Assessed and discussed  Menu Planning: Assessed and Discussed  Dining Out: Assessed and Discussed  Physical Activity: Assessed and Discussed  Medications: Assessed and Discussed    Acute Complications: Prevent, Detect, Treat:      Hyperglycemia: Assessed and Discussed  Goal Setting and Problem Solving: Assessed and Discussed  Barriers: Assessed and Discussed  Psychosocial Adjustments: Assessed and Discussed

## 2021-06-30 ENCOUNTER — RECORDS - HEALTHEAST (OUTPATIENT)
Dept: ADMINISTRATIVE | Facility: OTHER | Age: 36
End: 2021-06-30

## 2021-06-30 ENCOUNTER — OFFICE VISIT (OUTPATIENT)
Dept: NEUROLOGY | Facility: CLINIC | Age: 36
End: 2021-06-30
Payer: COMMERCIAL

## 2021-06-30 VITALS
BODY MASS INDEX: 25.86 KG/M2 | HEIGHT: 61 IN | DIASTOLIC BLOOD PRESSURE: 88 MMHG | WEIGHT: 137 LBS | HEART RATE: 66 BPM | SYSTOLIC BLOOD PRESSURE: 135 MMHG

## 2021-06-30 DIAGNOSIS — R20.2 PARESTHESIA: Primary | ICD-10-CM

## 2021-06-30 PROBLEM — U07.1 COVID-19: Status: RESOLVED | Noted: 2020-10-27 | Resolved: 2021-06-30

## 2021-06-30 PROBLEM — U07.1 COVID-19: Status: ACTIVE | Noted: 2020-10-27

## 2021-06-30 PROCEDURE — 99205 OFFICE O/P NEW HI 60 MIN: CPT | Performed by: PSYCHIATRY & NEUROLOGY

## 2021-06-30 RX ORDER — GABAPENTIN 100 MG/1
2 CAPSULE ORAL 3 TIMES DAILY
COMMUNITY
Start: 2021-06-27 | End: 2021-08-24

## 2021-06-30 RX ORDER — CETIRIZINE HYDROCHLORIDE 10 MG/1
TABLET ORAL
COMMUNITY
Start: 2020-11-20 | End: 2021-09-21

## 2021-06-30 ASSESSMENT — MIFFLIN-ST. JEOR: SCORE: 1248.81

## 2021-06-30 NOTE — PROGRESS NOTES
"NEUROLOGY CONSULTATION NOTE       [facility] [place]  [address] [place] [address]  Tel: [phone]  Fax: [phone]  [email or web address]     Neha Mcginnis  1985, MRN [id number]  PCP: Haily Shore  Date: 2021     ASSESSMENT & PLAN     Diagnosis code  1. Paresthesias     Paresthesia  36 years old female with history of COVID-19 infection, gestational diabetes who was referred for evaluation of somewhat unusual symptoms.  She describes shooting, tingling, sharp \"overactive nerves\" all over her body since .  Recent lab work included normal vitamin B12, heavy metal panel, vitamin D and cortisol.  I have recommended checking EMG of right upper and lower extremity in addition lab work including antinuclear antibody, hemoglobin A1c, Lyme titer, methylmalonic acid level, vitamin B1 and vitamin B6.  Follow-up will be the day she is scheduled for EMG.  I do suspect there is a functional component and if above work-up is negative she might benefit from a trial of SSRI.    Thank you again for this referral, please feel free to contact me if you have any questions.    Christiano Rowe MD  [facility]  (Formerly, [facility]     REASON FOR CONSULTATION Numbness        HISTORY OF PRESENT ILLNESS     We have been requested by Dr. Shore to evaluate Neha Mcginnis who is a 36 year old  female for paresthesia.    Patient is a 36 years old female with history of COVID-19 infection, gestational diabetes, appendectomy who was referred for evaluation of electric shocklike feeling with pins-and-needles all over her body since .  Patient has somewhat unusual description of her symptoms and reports that since 2014 she has been experiencing shooting, tingling sharp or active nerves all over her body.  She feels as if her muscles are twitching although no one can see the muscles twitching.  This happens all over and she has " noticed it got worse during period of stress.  She tried keto diet and during the process of losing weight she had increased such symptoms.  Also occasionally she had some visual symptoms.  She denies any dysphagia, dysarthria or any loss of consciousness.  Although she has this symptoms she has not associated any weakness with the symptoms.     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    Surgical History:  She  has a past surgical history that includes appendectomy and biopsy of skin lesion.     SOCIAL HISTORY     Reviewed, and she  reports that she has quit smoking. Her smoking use included cigarettes. She has never used smokeless tobacco. She reports that she does not drink alcohol or use drugs.     FAMILY HISTORY     Reviewed, and family history includes Cerebrovascular Disease in her mother; Hypertension in her mother; Myocardial Infarction in her father.     ALLERGIES     Allergies   Allergen Reactions     Other (Do Not Use) Rash     [place] CAPS, 03/07/2014.       Other Environmental Allergy          REVIEW OF SYSTEMS     A 12 point review of system was performed and was negative except as outlined in the history of present illness.     HOME MEDICATIONS     Current Outpatient Rx   Medication Sig Dispense Refill     cetirizine (ZYRTEC) 10 MG tablet        diphenhydrAMINE (BENADRYL) 25 MG capsule Take 25 mg by mouth every 6 hours as needed for itching or allergies       gabapentin (NEURONTIN) 100 MG capsule Take 2 capsules by mouth 3 times daily    "    MULTIPLE VITAMINS PO        vitamin C (ASCORBIC ACID) 1000 MG TABS Take 1,000 mg by mouth daily       zinc gluconate 50 MG tablet Take 50 mg by mouth daily           PHYSICAL EXAM     Vital signs  /88 (BP Location: Right arm, Patient Position: Sitting)   Pulse 66   Ht 1.549 m (5' 1\")   Wt 62.1 kg (137 lb)   BMI 25.89 kg/m      Weight:   137 lbs 0 oz    Patient is alert and oriented x4 in no acute distress. Vital signs were reviewed and are documented in electronic medical record. Neck was supple, no carotid bruits, thyromegaly, JVD, or lymphadenopathy was noted.   NEUROLOGY EXAM:    Patient s speech was normal with no aphasia or dysarthria. Mentation, and affect were also normal.     Funduscopic exam was normal, with normal cup to disc ratio. Cranial nerves II -XII were intact.     Patient had normal mass, tone and motor strength was 5/5 in all extremities without pronator drift.     Sensation was intact to light touch, pinprick, and vibratory sensation.     Reflexes were 1+ symmetrical with downgoing toes.     No dysmetria noted on FNF or HKS. Romberg was negative.    Gait testing was normal. Able to walk on toes/heels. Tandem walk normal.     DIAGNOSTIC STUDIES     PERTINENT RADIOLOGY  Following imaging studies were reviewed:     XR LUMBAR SPINE 3/18/2019  LUMBAR SPINE: There is transitional lumbosacral segmentation with sacralization of L5 and rudimentary L5-S1 disc. Rudimentary 12th ribs are noted. Normal vertebral body heights. Very mild levocurvature of the lumbar spine, apex at L3-4. There is   straightening of the normal lumbar lordosis, though without spondylolisthesis. The developed lumbar intervertebral disc spaces are well-maintained. Mild degenerative facet arthropathy L3-4 and L4-5. Normal appearance of the bilateral sacroiliac joints.   Soft tissues are unremarkable.     PERTINENT LABS  Following labs were reviewed:  No visits with results within 3 Month(s) from this visit.   Latest " known visit with results is:   Ambulatory - [facility] on 10/23/2020   Component Date Value     Arsenic, Whole Blood 10/23/2020 <10      Lead,Whole Blood 10/23/2020 <1      Lab State Reported To 10/23/2020 [place]      Lab Sample Type 10/23/2020 Venous      Mercury,Whole Blood 10/23/2020 <5      See Scanned Result 10/23/2020 See Scanned Report      Vitamin D, Total (25-Hyd* 10/23/2020 57.4      Cortisol 10/23/2020 5.6         Total time spent for face to face visit, reviewing labs/imaging studies, counseling and coordination of care was: 1 Hour 15 Minutes spent on the date of the encounter doing chart review, review of outside records, review of test results, interpretation of tests, patient visit and documentation       This note was dictated using voice recognition software.  Any grammatical or context distortions are unintentional and inherent to the software.    Orders Placed This Encounter   Procedures     COLEMAN w/Reflex ([facility])     Hemoglobin A1c     Lyme Disease Ab, Total and IgM, Reflex to WB ([facility])     Vitamin B1 plasma     Vitamin B6 ([facility])     Methylmalonic Acid     EMG      New Prescriptions    No medications on file      Modified Medications    No medications on file

## 2021-06-30 NOTE — NURSING NOTE
Chief Complaint   Patient presents with     Numbness     All over body tingling and shooting pains- began about 5 years ago      Radha Hernandez CMA on 6/30/2021 at 12:08 PM

## 2021-06-30 NOTE — LETTER
"    2021         RE: Neha Mcginnis  [address] [address]        Dear Colleague,    Thank you for referring your patient, Neha Mcginnis, to the [facility] [place]. Please see a copy of my visit note below.    NEUROLOGY CONSULTATION NOTE       [facility] [place]  [address] [place] [address]  Tel: [phone]  Fax: [phone]  [email or web address]     Neha Mcginnis,  1985, MRN [id number]  PCP: Haily Shore  Date: 2021     ASSESSMENT & PLAN     Diagnosis code  1. Paresthesias     Paresthesia  36 years old female with history of COVID-19 infection, gestational diabetes who was referred for evaluation of somewhat unusual symptoms.  She describes shooting, tingling, sharp \"overactive nerves\" all over her body since .  Recent lab work included normal vitamin B12, heavy metal panel, vitamin D and cortisol.  I have recommended checking EMG of right upper and lower extremity in addition lab work including antinuclear antibody, hemoglobin A1c, Lyme titer, methylmalonic acid level, vitamin B1 and vitamin B6.  Follow-up will be the day she is scheduled for EMG.  I do suspect there is a functional component and if above work-up is negative she might benefit from a trial of SSRI.    Thank you again for this referral, please feel free to contact me if you have any questions.    Christiano Rowe MD  [facility]  (Former[facility], [facility]     REASON FOR CONSULTATION Numbness        HISTORY OF PRESENT ILLNESS     We have been requested by Dr. Shore to evaluate Neha Mcginnis who is a 36 year old  female for paresthesia.    Patient is a 36 years old female with history of COVID-19 infection, gestational diabetes, appendectomy who was referred for evaluation of electric shocklike feeling with pins-and-needles all over her body since .  Patient has somewhat unusual description of her " symptoms and reports that since 2014 she has been experiencing shooting, tingling sharp or active nerves all over her body.  She feels as if her muscles are twitching although no one can see the muscles twitching.  This happens all over and she has noticed it got worse during period of stress.  She tried keto diet and during the process of losing weight she had increased such symptoms.  Also occasionally she had some visual symptoms.  She denies any dysphagia, dysarthria or any loss of consciousness.  Although she has this symptoms she has not associated any weakness with the symptoms.     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    Surgical History:  She  has a past surgical history that includes appendectomy and biopsy of skin lesion.     SOCIAL HISTORY     Reviewed, and she  reports that she has quit smoking. Her smoking use included cigarettes. She has never used smokeless tobacco. She reports that she does not drink alcohol or use drugs.     FAMILY HISTORY     Reviewed, and family history includes Cerebrovascular Disease in her mother; Hypertension in her mother; Myocardial Infarction in her father.     ALLERGIES     Allergies   Allergen Reactions     Other (Do Not Use) Rash     [place] CAPS, 03/07/2014.       Other Environmental Allergy          REVIEW OF SYSTEMS     A 12 point review of system was performed and was negative except as outlined in the history of present illness.     HOME MEDICATIONS     Current Outpatient Rx   Medication Sig  "Dispense Refill     cetirizine (ZYRTEC) 10 MG tablet        diphenhydrAMINE (BENADRYL) 25 MG capsule Take 25 mg by mouth every 6 hours as needed for itching or allergies       gabapentin (NEURONTIN) 100 MG capsule Take 2 capsules by mouth 3 times daily       MULTIPLE VITAMINS PO        vitamin C (ASCORBIC ACID) 1000 MG TABS Take 1,000 mg by mouth daily       zinc gluconate 50 MG tablet Take 50 mg by mouth daily           PHYSICAL EXAM     Vital signs  /88 (BP Location: Right arm, Patient Position: Sitting)   Pulse 66   Ht 1.549 m (5' 1\")   Wt 62.1 kg (137 lb)   BMI 25.89 kg/m      Weight:   137 lbs 0 oz    Patient is alert and oriented x4 in no acute distress. Vital signs were reviewed and are documented in electronic medical record. Neck was supple, no carotid bruits, thyromegaly, JVD, or lymphadenopathy was noted.   NEUROLOGY EXAM:    Patient s speech was normal with no aphasia or dysarthria. Mentation, and affect were also normal.     Funduscopic exam was normal, with normal cup to disc ratio. Cranial nerves II -XII were intact.     Patient had normal mass, tone and motor strength was 5/5 in all extremities without pronator drift.     Sensation was intact to light touch, pinprick, and vibratory sensation.     Reflexes were 1+ symmetrical with downgoing toes.     No dysmetria noted on FNF or HKS. Romberg was negative.    Gait testing was normal. Able to walk on toes/heels. Tandem walk normal.     DIAGNOSTIC STUDIES     PERTINENT RADIOLOGY  Following imaging studies were reviewed:     XR LUMBAR SPINE 3/18/2019  LUMBAR SPINE: There is transitional lumbosacral segmentation with sacralization of L5 and rudimentary L5-S1 disc. Rudimentary 12th ribs are noted. Normal vertebral body heights. Very mild levocurvature of the lumbar spine, apex at L3-4. There is   straightening of the normal lumbar lordosis, though without spondylolisthesis. The developed lumbar intervertebral disc spaces are well-maintained. Mild " degenerative facet arthropathy L3-4 and L4-5. Normal appearance of the bilateral sacroiliac joints.   Soft tissues are unremarkable.     PERTINENT LABS  Following labs were reviewed:  No visits with results within 3 Month(s) from this visit.   Latest known visit with results is:   Ambulatory - [facility] on 10/23/2020   Component Date Value     Arsenic, Whole Blood 10/23/2020 <10      Lead,Whole Blood 10/23/2020 <1      Lab State Reported To 10/23/2020 [place]      Lab Sample Type 10/23/2020 Venous      Mercury,Whole Blood 10/23/2020 <5      See Scanned Result 10/23/2020 See Scanned Report      Vitamin D, Total (25-Hyd* 10/23/2020 57.4      Cortisol 10/23/2020 5.6         Total time spent for face to face visit, reviewing labs/imaging studies, counseling and coordination of care was: 1 Hour 15 Minutes spent on the date of the encounter doing chart review, review of outside records, review of test results, interpretation of tests, patient visit and documentation       This note was dictated using voice recognition software.  Any grammatical or context distortions are unintentional and inherent to the software.    Orders Placed This Encounter   Procedures     COLEMAN w/Reflex ([facility])     Hemoglobin A1c     Lyme Disease Ab, Total and IgM, Reflex to WB ([facility])     Vitamin B1 plasma     Vitamin B6 ([facility])     Methylmalonic Acid     EMG      New Prescriptions    No medications on file      Modified Medications    No medications on file              Again, thank you for allowing me to participate in the care of your patient.        Sincerely,        Christiano Rowe MD

## 2021-07-03 NOTE — ADDENDUM NOTE
Addendum Note by Marti Minor MD at 10/9/2019  4:30 PM     Author: Marti Minor MD Service: -- Author Type: Physician    Filed: 10/9/2019  4:30 PM Encounter Date: 10/9/2019 Status: Signed    : Marti Minor MD (Physician)    Addended by: MARTI MINOR on: 10/9/2019 04:30 PM        Modules accepted: Orders

## 2021-07-06 VITALS
BODY MASS INDEX: 26.64 KG/M2 | WEIGHT: 141 LBS | OXYGEN SATURATION: 99 % | HEART RATE: 66 BPM | DIASTOLIC BLOOD PRESSURE: 80 MMHG | SYSTOLIC BLOOD PRESSURE: 120 MMHG

## 2021-07-14 PROBLEM — O42.92 FULL-TERM PREMATURE RUPTURE OF MEMBRANES: Status: RESOLVED | Noted: 2020-05-26 | Resolved: 2020-10-20

## 2021-07-14 PROBLEM — O24.419 GESTATIONAL DIABETES MELLITUS IN PREGNANCY, UNSPECIFIED CONTROL: Status: RESOLVED | Noted: 2017-04-01 | Resolved: 2020-10-20

## 2021-07-14 PROBLEM — Z34.90 PREGNANT: Status: RESOLVED | Noted: 2020-05-26 | Resolved: 2020-10-20

## 2021-07-14 PROBLEM — O24.414 GESTATIONAL DIABETES MELLITUS (GDM) REQUIRING INSULIN: Status: RESOLVED | Noted: 2020-05-26 | Resolved: 2020-10-20

## 2021-08-11 ENCOUNTER — HOSPITAL ENCOUNTER (OUTPATIENT)
Facility: CLINIC | Age: 36
Discharge: HOME | End: 2021-08-11
Admitting: PSYCHIATRY & NEUROLOGY
Payer: COMMERCIAL

## 2021-08-11 PROCEDURE — 36415 COLL VENOUS BLD VENIPUNCTURE: CPT

## 2021-08-11 PROCEDURE — 83036 HEMOGLOBIN GLYCOSYLATED A1C: CPT | Performed by: PSYCHIATRY & NEUROLOGY

## 2021-08-11 PROCEDURE — 86038 ANTINUCLEAR ANTIBODIES: CPT | Performed by: PSYCHIATRY & NEUROLOGY

## 2021-08-11 PROCEDURE — 86618 LYME DISEASE ANTIBODY: CPT | Performed by: PSYCHIATRY & NEUROLOGY

## 2021-08-11 PROCEDURE — 84207 ASSAY OF VITAMIN B-6: CPT

## 2021-08-11 PROCEDURE — 84425 ASSAY OF VITAMIN B-1: CPT

## 2021-08-12 ENCOUNTER — LAB (OUTPATIENT)
Dept: LAB | Facility: CLINIC | Age: 36
End: 2021-08-12
Payer: COMMERCIAL

## 2021-08-12 DIAGNOSIS — R20.2 PARESTHESIAS: Primary | ICD-10-CM

## 2021-08-12 DIAGNOSIS — R20.2 PARESTHESIA: ICD-10-CM

## 2021-08-12 LAB — HBA1C MFR BLD: 5 %

## 2021-08-13 LAB
ANA PAT SER IF-IMP: ABNORMAL
ANA SER QL IF: ABNORMAL
ANA TITR SER IF: ABNORMAL {TITER}
B BURGDOR IGG+IGM SER QL: 0.15

## 2021-08-16 LAB
PYRIDOXAL PHOS SERPL-SCNC: 180.5 NMOL/L
VIT B1 SERPL-SCNC: 6 NMOL/L

## 2021-08-24 ENCOUNTER — OFFICE VISIT (OUTPATIENT)
Dept: NEUROLOGY | Facility: CLINIC | Age: 36
End: 2021-08-24
Attending: PSYCHIATRY & NEUROLOGY
Payer: COMMERCIAL

## 2021-08-24 ENCOUNTER — OFFICE VISIT (OUTPATIENT)
Dept: NEUROLOGY | Facility: CLINIC | Age: 36
End: 2021-08-24
Payer: COMMERCIAL

## 2021-08-24 VITALS
WEIGHT: 137 LBS | BODY MASS INDEX: 25.86 KG/M2 | HEART RATE: 65 BPM | HEIGHT: 61 IN | SYSTOLIC BLOOD PRESSURE: 133 MMHG | DIASTOLIC BLOOD PRESSURE: 84 MMHG

## 2021-08-24 DIAGNOSIS — R20.2 PARESTHESIA: ICD-10-CM

## 2021-08-24 DIAGNOSIS — R20.2 PARESTHESIA: Primary | ICD-10-CM

## 2021-08-24 PROCEDURE — 95886 MUSC TEST DONE W/N TEST COMP: CPT | Mod: RT | Performed by: PSYCHIATRY & NEUROLOGY

## 2021-08-24 PROCEDURE — 99213 OFFICE O/P EST LOW 20 MIN: CPT | Mod: 25 | Performed by: PSYCHIATRY & NEUROLOGY

## 2021-08-24 PROCEDURE — 95911 NRV CNDJ TEST 9-10 STUDIES: CPT | Performed by: PSYCHIATRY & NEUROLOGY

## 2021-08-24 RX ORDER — NORTRIPTYLINE HCL 25 MG
25 CAPSULE ORAL AT BEDTIME
Qty: 30 CAPSULE | Refills: 6 | Status: SHIPPED | OUTPATIENT
Start: 2021-08-24 | End: 2022-04-20 | Stop reason: SINTOL

## 2021-08-24 RX ORDER — GABAPENTIN 300 MG/1
300 CAPSULE ORAL 3 TIMES DAILY
Qty: 90 CAPSULE | Refills: 11 | Status: SHIPPED | OUTPATIENT
Start: 2021-08-24 | End: 2022-04-20

## 2021-08-24 ASSESSMENT — MIFFLIN-ST. JEOR: SCORE: 1248.81

## 2021-08-24 NOTE — NURSING NOTE
Chief Complaint   Patient presents with     Numbness     Discuss EMG and lab results      Radha Hernandez CMA on 8/24/2021 at 1:02 PM

## 2021-08-24 NOTE — PROCEDURES
ELECTROMYOGRAPHY (EMG) REPORT       [facility] [place] [address] [place] [address]  Tel: [phone]  Fax: [phone]  [email or web address]     Neha Mcginnis CARLA 1985, MRN [id number]  PCP: Haily Shore  Date: 2021     Principal Diagnosis: Paresthesia     Height: 5 feet 1 inch  Reason for referral: Evaluate right upper/right lower. c/o sensory in arms/legs > 7 years. Right = Left. Gestational Diabetic.       Motor NCS      Nerve / Sites Lat Amp Dist Bebeto    ms mV cm m/s   R Median - APB      Wrist 3.02 7.6 7       Elbow 6.41 6.6 21 62   R Ulnar - ADM      Wrist 2.81 10.0 7       B.Elbow 5.68 9.4 18.5 65      A.Elbow 7.14 8.7 10 69   R Peroneal - EDB      Ankle 5.05 2.4 8       Fib head 9.58 2.2 23.5 52      Pop fossa 11.35 1.7 10 56   R Tibial - AH      Ankle 4.53 13.2 8       Pop fossa 10.47 12.1 33 56       F  Wave      Nerve Fmin    ms   R Ulnar - ADM 24.84   R Tibial - AH 43.80       Sensory NCS      Nerve / Sites Onset Lat Pk Lat Amp.2-3 Dist Bebeto Lat Diff    ms ms  V cm m/s ms   R Median - II (Antidr)      Wrist 2.03 2.55 73.3 13 64    R Ulnar - V (Antidr)      Wrist 1.72 2.29 46.1 11 64    R Median, Ulnar - Transcarpal comparison      Median Palm 1.25 1.61 48.9 8 64       Ulnar Palm 1.35 1.61 32.7 8 59          0.00   R Sural - Ankle (Calf)      Calf 2.45 3.13 34.5 14 57    R Superficial peroneal - Ankle      Lat leg 2.24 2.76 14.4 12 54        EMG Summary Table     Spontaneous MUAP Rcmt Note   Muscle Fib PSW Fasc IA # Amp Dur PPP Rate Type   R. Gluteus medius None None None N N N N N N N   R. Gluteus freya None None None N N N N N N N   R. L3 paraspinal None None None N N N N N N N   R. L4 paraspinal None None None N N N N N N N   R. L5 paraspinal None None None N N N N N N N   R. S1 paraspinal None None None N N N N N N N   R. Adductor anjana None None None N N N N N N N   R. Quadriceps None None None N N N N N N N   R. Tibialis anterior None None None  N N N N N N N   R. Gastrocnemius (Medial head) None None None N N N N N N N   R. Brachioradialis None None None N N N N N N N   R. Pronator teres None None None N N N N N N N   R. Biceps brachii None None None N N N N N N N   R. Deltoid None None None N N N N N N N   R. Triceps brachii None None None N N N N N N N   R. Flexor carpi ulnaris None None None N N N N N N N   R. First dorsal interosseous None None None N N N N N N N   R. Abductor pollicis brevis None None None N N N N N N N        Summary: Nerve conduction and EMG study of right upper and lower extremities shows:  1. Normal right median, ulnar, peroneal and tibial distal motor latencies, amplitudes and conduction velocities.  2. Normal right ulnar and tibial F latencies  3. Normal right median, ulnar, sural and superficial peroneal SNAP.  4. Disposable, monopolar needle exam was normal    Impression:   This is a normal nerve conduction and EMG study of right upper and lower extremities      Christiano Rowe MD  [facility]  (Former[facility], [facility]      This note was dictated using voice recognition software.  Any grammatical or context distortions are unintentional and inherent to the software.

## 2021-08-24 NOTE — LETTER
"    2021         RE: Neha Mcginnis  [address] [facility] [address]        Dear Colleague,    Thank you for referring your patient, Neha Mcginnis, to the [facility] [place]. Please see a copy of my visit note below.    NEUROLOGY FOLLOW UP VISIT  NOTE       [facility]UR[facility] [place]  [address] [place] [address]  Tel: [phone]  Fax: [phone]  [email or web address]     Neha Mcginnis,  1985, MRN [id number]  PCP: Haily Shore  Date: 2021      ASSESSMENT & PLAN     Visit Diagnosis  1. Paresthesia     Paresthesia  36 years old female with history of COVID-19 infection, gestational diabetes who was referred for evaluation of generalized tingling, sharp shooting pain that she describes as \"overactive nerves\".  Recent lab work included normal vitamin B12, heavy metal panel, vitamin D, cortisol, vitamin B1, vitamin B6, hemoglobin A1c and Lyme titer.  Antinuclear antibody was borderline positive at 1: 80.  EMG of right upper and lower extremity was normal.  I do suspect her symptoms are related to her underlying depression and anxiety and I have increased the dose of gabapentin to 300 mg 3 times daily and also started her on nortriptyline 25 mg at bedtime.  I am holding off on any work-up for the borderline positive antinuclear antibody but will repeat COLEMAN in 6 months and if it still positive will refer her to rheumatology.  Regular follow-up will be in 6 months    Thank you again for this referral, please feel free to contact me if you have any questions.    Christiano Rowe MD  [facility]  (Formerly, [facility]     HISTORY OF PRESENT ILLNESS     Patient is a 36 years old female with history of COVID-19 infection, gestational diabetes, appendectomy who was referred for evaluation of electric shocklike feeling with pins-and-needles all over her body since .  Patient has somewhat unusual " description of her symptoms and reports that since 2014 she has been experiencing shooting, tingling sharp or active nerves all over her body.  She feels as if her muscles are twitching although no one can see the muscles twitching.  This happens all over and she has noticed it got worse during period of stress.  She tried keto diet and during the process of losing weight she had increased such symptoms.  Also occasionally she had some visual symptoms.  She denies any dysphagia, dysarthria or any loss of consciousness.  Although she has this symptoms she has not associated any weakness with the symptoms.  Patient had a EMG done today of right upper and lower extremity that was within normal limits.Lab work included normal vitamin B1, B6, hemoglobin A1c, Lyme titer but antinuclear antibody was borderline positive with titer 1: 80.  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    Surgical History:  She  has a past surgical history that includes appendectomy; biopsy of skin lesion; appendectomy; and Skin Cancer Excision.     SOCIAL HISTORY     Reviewed, and she  reports that she has quit smoking. Her smoking use included cigarettes. She has never used smokeless tobacco. She reports that she does not drink alcohol and does not use drugs.     FAMILY HISTORY     Reviewed, and family history includes  "Cerebrovascular Disease in her mother; Hypertension in her mother; Myocardial Infarction in her father; No Known Problems in her mother.     ALLERGIES     Allergies   Allergen Reactions     Other (Do Not Use) Rash     [place] CAPS, 03/07/2014.       Other Environmental Allergy          REVIEW OF SYSTEMS     A 12 point review of system was performed and was negative except as outlined in the history of present illness.     HOME MEDICATIONS     Current Outpatient Rx   Medication Sig Dispense Refill     cetirizine (ZYRTEC) 10 MG tablet        diphenhydrAMINE (BENADRYL) 25 MG capsule Take 25 mg by mouth every 6 hours as needed for itching or allergies       gabapentin (NEURONTIN) 300 MG capsule Take 1 capsule (300 mg) by mouth 3 times daily 90 capsule 11     nortriptyline (PAMELOR) 25 MG capsule Take 1 capsule (25 mg) by mouth At Bedtime 1 PO at bedtime x 1 week then 2 PO QHS 30 capsule 6     vitamin C (ASCORBIC ACID) 1000 MG TABS Take 1,000 mg by mouth daily       zinc gluconate 50 MG tablet Take 50 mg by mouth daily           PHYSICAL EXAM     Vital signs  /84 (BP Location: Left arm, Patient Position: Sitting)   Pulse 65   Ht 1.549 m (5' 1\")   Wt 62.1 kg (137 lb)   BMI 25.89 kg/m      Weight:   137 lbs 0 oz    Patient is alert and oriented x4 in no acute distress. Vital signs were reviewed and are documented in electronic medical record. Neck was supple, no carotid bruits, thyromegaly, JVD, or lymphadenopathy was noted.   NEUROLOGY EXAM:    Patient s speech was normal with no aphasia or dysarthria. Mentation, and affect were also normal.     Funduscopic exam was normal, with normal cup to disc ratio. Cranial nerves II -XII were intact.     Patient had normal mass, tone and motor strength was 5/5 in all extremities without pronator drift.     Sensation was intact to light touch, pinprick, and vibratory sensation.     Reflexes were 1+ symmetrical with downgoing toes.     No dysmetria noted on FNF or HKS. " Romberg was negative.    Gait testing was normal. Able to walk on toes/heels. Tandem walk normal     DIAGNOSTIC STUDIES     PERTINENT RADIOLOGY  Following imaging studies were reviewed:     XR LUMBAR SPINE 3/18/2019  LUMBAR SPINE: There is transitional lumbosacral segmentation with sacralization of L5 and rudimentary L5-S1 disc. Rudimentary 12th ribs are noted. Normal vertebral body heights. Very mild levocurvature of the lumbar spine, apex at L3-4. There is   straightening of the normal lumbar lordosis, though without spondylolisthesis. The developed lumbar intervertebral disc spaces are well-maintained. Mild degenerative facet arthropathy L3-4 and L4-5. Normal appearance of the bilateral sacroiliac joints.   Soft tissues are unremarkable.    EMG 8/24/2021  This is a normal nerve conduction and EMG study of right upper and lower extremities     PERTINENT LABS  Following labs were reviewed:  Lab on 08/12/2021   Component Date Value     Vitamin B6 08/11/2021 180.5*     Vitamin B1 08/11/2021 6      Hemoglobin A1C 08/11/2021 5.0      Lyme Disease Antibodies * 08/11/2021 0.15      COLEMAN interpretation 08/11/2021 Borderline Positive*     COLEMAN pattern 1 08/11/2021 Speckled      COLEMAN titer 1 08/11/2021 1:80          Total time spent for face to face visit, reviewing labs/imaging studies, counseling and coordination of care was: 20 min spent on the date of the encounter doing chart review, review of outside records, review of test results, interpretation of tests, patient visit and documentation       This note was dictated using voice recognition software.  Any grammatical or context distortions are unintentional and inherent to the software.    No orders of the defined types were placed in this encounter.     New Prescriptions    NORTRIPTYLINE (PAMELOR) 25 MG CAPSULE    Take 1 capsule (25 mg) by mouth At Bedtime 1 PO at bedtime x 1 week then 2 PO QHS     Modified Medications    Modified Medication Previous Medication     GABAPENTIN (NEURONTIN) 300 MG CAPSULE gabapentin (NEURONTIN) 100 MG capsule       Take 1 capsule (300 mg) by mouth 3 times daily    Take 2 capsules by mouth 3 times daily                     Again, thank you for allowing me to participate in the care of your patient.        Sincerely,        Christiano Rowe MD

## 2021-08-24 NOTE — LETTER
8/24/2021         RE: Neha Mcginnis  [address] [facility] [address]        Dear Colleague,    Thank you for referring your patient, Neha Mcginnis, to the [facility]UROLOGY CL[facility] [place]. Please see a copy of my visit note below.    See procedure note for EMG report      Again, thank you for allowing me to participate in the care of your patient.        Sincerely,        Christiano Rowe MD

## 2021-08-24 NOTE — PROGRESS NOTES
"NEUROLOGY FOLLOW UP VISIT  NOTE       [facility] NEUROLOGY [place]  [address] [place] [address]  Tel: [phone]  Fax: [phone]  [email or web address]     Neha Mcginnis DOB 1985, MRN [id number]  PCP: Haily Shore  Date: 2021      ASSESSMENT & PLAN     Visit Diagnosis  1. Paresthesia     Paresthesia  36 years old female with history of COVID-19 infection, gestational diabetes who was referred for evaluation of generalized tingling, sharp shooting pain that she describes as \"overactive nerves\".  Recent lab work included normal vitamin B12, heavy metal panel, vitamin D, cortisol, vitamin B1, vitamin B6, hemoglobin A1c and Lyme titer.  Antinuclear antibody was borderline positive at 1: 80.  EMG of right upper and lower extremity was normal.  I do suspect her symptoms are related to her underlying depression and anxiety and I have increased the dose of gabapentin to 300 mg 3 times daily and also started her on nortriptyline 25 mg at bedtime.  I am holding off on any work-up for the borderline positive antinuclear antibody but will repeat COLEMAN in 6 months and if it still positive will refer her to rheumatology.  Regular follow-up will be in 6 months    Thank you again for this referral, please feel free to contact me if you have any questions.    Christiano Rowe MD  [facility]  (Former[facility], [facility]     HISTORY OF PRESENT ILLNESS     Patient is a 36 years old female with history of COVID-19 infection, gestational diabetes, appendectomy who was referred for evaluation of electric shocklike feeling with pins-and-needles all over her body since .  Patient has somewhat unusual description of her symptoms and reports that since 2014 she has been experiencing shooting, tingling sharp or active nerves all over her body.  She feels as if her muscles are twitching although no one can see the muscles twitching.  This " happens all over and she has noticed it got worse during period of stress.  She tried keto diet and during the process of losing weight she had increased such symptoms.  Also occasionally she had some visual symptoms.  She denies any dysphagia, dysarthria or any loss of consciousness.  Although she has this symptoms she has not associated any weakness with the symptoms.  Patient had a EMG done today of right upper and lower extremity that was within normal limits.Lab work included normal vitamin B1, B6, hemoglobin A1c, Lyme titer but antinuclear antibody was borderline positive with titer 1: 80.  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    Surgical History:  She  has a past surgical history that includes appendectomy; biopsy of skin lesion; appendectomy; and Skin Cancer Excision.     SOCIAL HISTORY     Reviewed, and she  reports that she has quit smoking. Her smoking use included cigarettes. She has never used smokeless tobacco. She reports that she does not drink alcohol and does not use drugs.     FAMILY HISTORY     Reviewed, and family history includes Cerebrovascular Disease in her mother; Hypertension in her mother; Myocardial Infarction in her father; No Known Problems in her mother.     ALLERGIES     Allergies   Allergen Reactions     Other (Do Not Use) Rash     [place] CAPS,  "03/07/2014.       Other Environmental Allergy          REVIEW OF SYSTEMS     A 12 point review of system was performed and was negative except as outlined in the history of present illness.     HOME MEDICATIONS     Current Outpatient Rx   Medication Sig Dispense Refill     cetirizine (ZYRTEC) 10 MG tablet        diphenhydrAMINE (BENADRYL) 25 MG capsule Take 25 mg by mouth every 6 hours as needed for itching or allergies       gabapentin (NEURONTIN) 300 MG capsule Take 1 capsule (300 mg) by mouth 3 times daily 90 capsule 11     nortriptyline (PAMELOR) 25 MG capsule Take 1 capsule (25 mg) by mouth At Bedtime 1 PO at bedtime x 1 week then 2 PO QHS 30 capsule 6     vitamin C (ASCORBIC ACID) 1000 MG TABS Take 1,000 mg by mouth daily       zinc gluconate 50 MG tablet Take 50 mg by mouth daily           PHYSICAL EXAM     Vital signs  /84 (BP Location: Left arm, Patient Position: Sitting)   Pulse 65   Ht 1.549 m (5' 1\")   Wt 62.1 kg (137 lb)   BMI 25.89 kg/m      Weight:   137 lbs 0 oz    Patient is alert and oriented x4 in no acute distress. Vital signs were reviewed and are documented in electronic medical record. Neck was supple, no carotid bruits, thyromegaly, JVD, or lymphadenopathy was noted.   NEUROLOGY EXAM:    Patient s speech was normal with no aphasia or dysarthria. Mentation, and affect were also normal.     Funduscopic exam was normal, with normal cup to disc ratio. Cranial nerves II -XII were intact.     Patient had normal mass, tone and motor strength was 5/5 in all extremities without pronator drift.     Sensation was intact to light touch, pinprick, and vibratory sensation.     Reflexes were 1+ symmetrical with downgoing toes.     No dysmetria noted on FNF or HKS. Romberg was negative.    Gait testing was normal. Able to walk on toes/heels. Tandem walk normal     DIAGNOSTIC STUDIES     PERTINENT RADIOLOGY  Following imaging studies were reviewed:     XR LUMBAR SPINE 3/18/2019  LUMBAR SPINE: There " is transitional lumbosacral segmentation with sacralization of L5 and rudimentary L5-S1 disc. Rudimentary 12th ribs are noted. Normal vertebral body heights. Very mild levocurvature of the lumbar spine, apex at L3-4. There is   straightening of the normal lumbar lordosis, though without spondylolisthesis. The developed lumbar intervertebral disc spaces are well-maintained. Mild degenerative facet arthropathy L3-4 and L4-5. Normal appearance of the bilateral sacroiliac joints.   Soft tissues are unremarkable.    EMG 8/24/2021  This is a normal nerve conduction and EMG study of right upper and lower extremities     PERTINENT LABS  Following labs were reviewed:  Lab on 08/12/2021   Component Date Value     Vitamin B6 08/11/2021 180.5*     Vitamin B1 08/11/2021 6      Hemoglobin A1C 08/11/2021 5.0      Lyme Disease Antibodies * 08/11/2021 0.15      COLEMAN interpretation 08/11/2021 Borderline Positive*     COLEMAN pattern 1 08/11/2021 Speckled      COLEMAN titer 1 08/11/2021 1:80          Total time spent for face to face visit, reviewing labs/imaging studies, counseling and coordination of care was: 20 min spent on the date of the encounter doing chart review, review of outside records, review of test results, interpretation of tests, patient visit and documentation       This note was dictated using voice recognition software.  Any grammatical or context distortions are unintentional and inherent to the software.    No orders of the defined types were placed in this encounter.     New Prescriptions    NORTRIPTYLINE (PAMELOR) 25 MG CAPSULE    Take 1 capsule (25 mg) by mouth At Bedtime 1 PO at bedtime x 1 week then 2 PO QHS     Modified Medications    Modified Medication Previous Medication    GABAPENTIN (NEURONTIN) 300 MG CAPSULE gabapentin (NEURONTIN) 100 MG capsule       Take 1 capsule (300 mg) by mouth 3 times daily    Take 2 capsules by mouth 3 times daily

## 2021-09-21 DIAGNOSIS — Z91.09 ENVIRONMENTAL ALLERGIES: Primary | ICD-10-CM

## 2021-09-21 DIAGNOSIS — O99.810 ABNORMAL MATERNAL GLUCOSE TOLERANCE, ANTEPARTUM: ICD-10-CM

## 2021-09-21 DIAGNOSIS — K59.01 SLOW TRANSIT CONSTIPATION: ICD-10-CM

## 2021-09-21 RX ORDER — DOCUSATE SODIUM 100 MG/1
CAPSULE, LIQUID FILLED ORAL
Qty: 60 CAPSULE | Refills: 1 | Status: SHIPPED | OUTPATIENT
Start: 2021-09-21 | End: 2021-11-18

## 2021-09-21 RX ORDER — CETIRIZINE HYDROCHLORIDE 10 MG/1
TABLET ORAL
Qty: 90 TABLET | Refills: 2 | Status: SHIPPED | OUTPATIENT
Start: 2021-09-21

## 2021-09-21 RX ORDER — HUMAN INSULIN 100 [IU]/ML
INJECTION, SUSPENSION SUBCUTANEOUS
Qty: 30 ML | Refills: 1 | OUTPATIENT
Start: 2021-09-21

## 2021-09-21 NOTE — TELEPHONE ENCOUNTER
" Disp Refills Start End MACARIO   docusate sodium (COLACE) 100 MG capsule (Discontinued) 60 capsule 3 3/12/2020 11/20/2020 No   Sig - Route: Take 1 capsule (100 mg total) by mouth 2 (two) times a day. - Oral   Sent to pharmacy as: docusate sodium 100 mg capsule (COLACE)   E-Prescribing Status: Receipt confirmed by pharmacy (3/12/2020 11:59 AM CDT)       docusate sodium (COLACE) 100 MG capsule [[id number]]    Electronically signed by: Haily Shore MD on 03/12/20 1159 Status: Discontinued   Ordering user: Haily Shore MD 03/12/20 1159 Authorized by: Haily Shore MD   Frequency: BID 03/12/20 - 11/20/20 Released by: Haily Shore MD 03/12/20 1159   Discontinued by: Nereyda Carpenter, PharmD 11/20/20 1644   Diagnoses  Pregnancy, unspecified gestational age [Z34.90]       Routing refill request to provider for review/approval because:  Drug not active on patient's medication list    Docusate:   Last Written Prescription Date:  3/12/2020 end date of 11/20/2020  Last Fill Quantity: 60,  # refills: 3   Last office visit provider:  5/27/21      Requested Prescriptions   Pending Prescriptions Disp Refills      MG capsule [Pharmacy Med Name: DOK (DOCUSATE SODIUM) 100MG CAPS] 60 capsule      Sig: TAKE 1 CAPSULE(100 MG) BY MOUTH TWICE DAILY       Laxatives Protocol Failed - 9/21/2021 11:20 AM        Failed - Medication is active on med list        Passed - Patient is age 6 or older        Passed - Recent (12 mo) or future (30 days) visit within the authorizing provider's specialty     Patient has had an office visit with the authorizing provider or a provider within the authorizing providers department within the previous 12 mos or has a future within next 30 days. See \"Patient Info\" tab in inbasket, or \"Choose Columns\" in Meds & Orders section of the refill encounter.               Signed Prescriptions Disp Refills    cetirizine (ZYRTEC) 10 MG tablet 90 tablet 2     Sig: TAKE 1 TABLET(10 MG) BY " "MOUTH TWICE DAILY AS NEEDED FOR ALLERGIES       Antihistamines Protocol Passed - 9/21/2021 11:20 AM        Passed - Patient is 3-64 years of age     Apply weight-based dosing for peds patients age 3 - 12 years of age.    Forward request to provider for patients under the age of 3 or over the age of 64.          Passed - Recent (12 mo) or future (30 days) visit within the authorizing provider's specialty     Patient has had an office visit with the authorizing provider or a provider within the authorizing providers department within the previous 12 mos or has a future within next 30 days. See \"Patient Info\" tab in inbasket, or \"Choose Columns\" in Meds & Orders section of the refill encounter.              Passed - Medication is active on med list             Jan Fox RN 09/21/21 5:16 PM  "

## 2021-09-21 NOTE — TELEPHONE ENCOUNTER
" Disp Refills Start End MACARIO   cetirizine (ZYRTEC) 10 MG tablet 90 tablet 3 3/12/2020  No   Sig - Route: Take 1 tablet (10 mg total) by mouth 2 (two) times a day as needed for allergies. - Oral   Sent to pharmacy as: cetirizine 10 mg tablet (ZyrTEC)   E-Prescribing Status: Receipt confirmed by pharmacy (3/12/2020 11:59 AM CDT)       cetirizine (ZYRTEC) 10 MG tablet [[id number]]    Electronically signed by: Haily Shore MD on 03/12/20 1159 Status: Active   Ordering user: Haily Shore MD 03/12/20 1159 Authorized by: Haily Shore MD   PRN reasons: allergies   Frequency: BID PRN 03/12/20 - Until Discontinued Released by: Haily Shore MD 03/12/20 1159   Diagnoses  Environmental allergies [Z91.09]       Zyrtec:   Last Written Prescription Date:  3/12/2020  Last Fill Quantity: 90,  # refills: 3   Last office visit provider:  5/27/21     Requested Prescriptions   Pending Prescriptions Disp Refills     cetirizine (ZYRTEC) 10 MG tablet [Pharmacy Med Name: CETIRIZINE 10MG TABLETS] 90 tablet      Sig: TAKE 1 TABLET(10 MG) BY MOUTH TWICE DAILY AS NEEDED FOR ALLERGIES       Antihistamines Protocol Passed - 9/21/2021 11:20 AM        Passed - Patient is 3-64 years of age     Apply weight-based dosing for peds patients age 3 - 12 years of age.    Forward request to provider for patients under the age of 3 or over the age of 64.          Passed - Recent (12 mo) or future (30 days) visit within the authorizing provider's specialty     Patient has had an office visit with the authorizing provider or a provider within the authorizing providers department within the previous 12 mos or has a future within next 30 days. See \"Patient Info\" tab in inbasket, or \"Choose Columns\" in Meds & Orders section of the refill encounter.              Passed - Medication is active on med list            MG capsule [Pharmacy Med Name: DOK (DOCUSATE SODIUM) 100MG CAPS] 60 capsule      Sig: TAKE 1 CAPSULE(100 MG) BY " "MOUTH TWICE DAILY       Laxatives Protocol Failed - 9/21/2021 11:20 AM        Failed - Medication is active on med list        Passed - Patient is age 6 or older        Passed - Recent (12 mo) or future (30 days) visit within the authorizing provider's specialty     Patient has had an office visit with the authorizing provider or a provider within the authorizing providers department within the previous 12 mos or has a future within next 30 days. See \"Patient Info\" tab in inbasket, or \"Choose Columns\" in Meds & Orders section of the refill encounter.                   Jan Fox RN 09/21/21 5:15 PM  "

## 2021-09-21 NOTE — TELEPHONE ENCOUNTER
NOVOLIN INSULIN NPH U-100 HUMAN      Last Written Prescription Date:  4/9/20  Last Fill Quantity: 15 ml,   # refills: 3  Last Office Visit : 10/23/20  Future Office visit:  None scheduled    Routing refill request to provider for review/approval because:  Drug not active on patient's medication list  Why is she requesting refill?  Had used during pregnancy    Call to [facility] pharmacy after receiving refill request, reports medication is not on auto refill, pharmacy did not order this.

## 2021-10-03 ENCOUNTER — HEALTH MAINTENANCE LETTER (OUTPATIENT)
Age: 36
End: 2021-10-03

## 2021-10-11 RX ORDER — GABAPENTIN 100 MG/1
CAPSULE ORAL
Qty: 90 CAPSULE | OUTPATIENT
Start: 2021-10-11

## 2021-10-11 NOTE — TELEPHONE ENCOUNTER
Disp Refills Start End MACARIO   gabapentin (NEURONTIN) 100 MG capsule (Discontinued)   6/27/2021 8/24/2021 --   Sig - Route: Take 2 capsules by mouth 3 times daily - Oral   Class: Historical   Reason for Discontinue: Reorder   Order: [id number]

## 2021-10-28 RX ORDER — GABAPENTIN 100 MG/1
CAPSULE ORAL
Qty: 360 CAPSULE | OUTPATIENT
Start: 2021-10-28

## 2021-10-28 NOTE — TELEPHONE ENCOUNTER
Disp Refills Start End MACARIO   gabapentin (NEURONTIN) 300 MG capsule 90 capsule 11 8/24/2021  No   Sig - Route: Take 1 capsule (300 mg) by mouth 3 times daily - Oral   Sent to pharmacy as: Gabapentin 300 MG Oral Capsule (NEURONTIN)   Class: E-Prescribe   Order: [id number]   E-Prescribing Status: Receipt confirmed by pharmacy (8/24/2021  1:42 PM CDT)     Refilled 8/24/21 with 11 refills.  Wrong dosing requested.

## 2021-11-17 DIAGNOSIS — K59.01 SLOW TRANSIT CONSTIPATION: ICD-10-CM

## 2021-11-18 RX ORDER — DOCUSATE SODIUM 100 MG/1
CAPSULE, LIQUID FILLED ORAL
Qty: 60 CAPSULE | Refills: 3 | Status: SHIPPED | OUTPATIENT
Start: 2021-11-18

## 2021-11-18 NOTE — TELEPHONE ENCOUNTER
"Last Written Prescription Date:  9/21/2021  Last Fill Quantity: 60,  # refills: 1   Last office visit provider:  8/21/2021     Requested Prescriptions   Pending Prescriptions Disp Refills     docusate sodium (COLACE) 100 MG capsule [Pharmacy Med Name: DOCUSATE SOD 100MG CAPSULES] 60 capsule 1     Sig: TAKE 1 CAPSULE(100 MG) BY MOUTH TWICE DAILY       Laxatives Protocol Passed - 11/17/2021  3:45 AM        Passed - Patient is age 6 or older        Passed - Recent (12 mo) or future (30 days) visit within the authorizing provider's specialty     Patient has had an office visit with the authorizing provider or a provider within the authorizing providers department within the previous 12 mos or has a future within next 30 days. See \"Patient Info\" tab in inbasket, or \"Choose Columns\" in Meds & Orders section of the refill encounter.              Passed - Medication is active on med list             Gloria Mcclelland RN 11/18/21 10:15 AM  "

## 2022-01-23 ENCOUNTER — HEALTH MAINTENANCE LETTER (OUTPATIENT)
Age: 37
End: 2022-01-23

## 2022-04-20 ENCOUNTER — OFFICE VISIT (OUTPATIENT)
Dept: FAMILY MEDICINE | Facility: CLINIC | Age: 37
End: 2022-04-20
Payer: COMMERCIAL

## 2022-04-20 VITALS
TEMPERATURE: 99.1 F | OXYGEN SATURATION: 98 % | SYSTOLIC BLOOD PRESSURE: 126 MMHG | HEIGHT: 61 IN | HEART RATE: 72 BPM | BODY MASS INDEX: 23.79 KG/M2 | DIASTOLIC BLOOD PRESSURE: 76 MMHG | WEIGHT: 126 LBS

## 2022-04-20 DIAGNOSIS — R14.0 BLOATING: ICD-10-CM

## 2022-04-20 DIAGNOSIS — F45.0 ANXIETY WITH SOMATIZATION: ICD-10-CM

## 2022-04-20 DIAGNOSIS — R30.0 DYSURIA: ICD-10-CM

## 2022-04-20 DIAGNOSIS — N76.0 VAGINITIS AND VULVOVAGINITIS: ICD-10-CM

## 2022-04-20 DIAGNOSIS — R20.2 PARESTHESIA: Primary | ICD-10-CM

## 2022-04-20 DIAGNOSIS — F41.9 ANXIETY WITH SOMATIZATION: ICD-10-CM

## 2022-04-20 LAB
CLUE CELLS: ABNORMAL
TRICHOMONAS, WET PREP: ABNORMAL
WBC'S/HIGH POWER FIELD, WET PREP: ABNORMAL
YEAST, WET PREP: ABNORMAL

## 2022-04-20 PROCEDURE — 99215 OFFICE O/P EST HI 40 MIN: CPT | Performed by: PHYSICIAN ASSISTANT

## 2022-04-20 PROCEDURE — 87210 SMEAR WET MOUNT SALINE/INK: CPT | Performed by: PHYSICIAN ASSISTANT

## 2022-04-20 RX ORDER — GABAPENTIN 300 MG/1
300 CAPSULE ORAL AT BEDTIME
Qty: 90 CAPSULE | Refills: 11 | COMMUNITY
Start: 2022-04-20

## 2022-04-20 ASSESSMENT — ANXIETY QUESTIONNAIRES
5. BEING SO RESTLESS THAT IT IS HARD TO SIT STILL: NOT AT ALL
IF YOU CHECKED OFF ANY PROBLEMS ON THIS QUESTIONNAIRE, HOW DIFFICULT HAVE THESE PROBLEMS MADE IT FOR YOU TO DO YOUR WORK, TAKE CARE OF THINGS AT HOME, OR GET ALONG WITH OTHER PEOPLE: NOT DIFFICULT AT ALL
1. FEELING NERVOUS, ANXIOUS, OR ON EDGE: NOT AT ALL
3. WORRYING TOO MUCH ABOUT DIFFERENT THINGS: NOT AT ALL
7. FEELING AFRAID AS IF SOMETHING AWFUL MIGHT HAPPEN: NOT AT ALL
GAD7 TOTAL SCORE: 1
2. NOT BEING ABLE TO STOP OR CONTROL WORRYING: NOT AT ALL
6. BECOMING EASILY ANNOYED OR IRRITABLE: SEVERAL DAYS

## 2022-04-20 ASSESSMENT — PATIENT HEALTH QUESTIONNAIRE - PHQ9
5. POOR APPETITE OR OVEREATING: NOT AT ALL
SUM OF ALL RESPONSES TO PHQ QUESTIONS 1-9: 6

## 2022-04-20 NOTE — PATIENT INSTRUCTIONS
I do not recommend long term fasting and it is important to eat more than one meal per day (otherwise your body will not absorb enough vitamins and minerals and that can contribute to abnormal symptoms).      I suggest you continue with the low sugar/low processed food diet, that is very healthy. Make sure you get plenty of vitamins/minerals from natural food (fruit/veggies/proteins) and do not rely on vitamin supplements.      If you continue to have GI symptoms after your brief fast, we could consider having you see a GI specialist.  It is very likely that uncontrolled anxiety is contributing to these symptoms (including the nerve symptoms).  You could consider an anxiety medication such as prozac/lexapro (which do not cause drowsiness).     Reach out if you would like me to prescribe something.

## 2022-04-20 NOTE — PROGRESS NOTES
Assessment & Plan     Paresthesia  Reviewed labs done at outside clinic (1 month ago).  She has since started supplements.  I suggest rechecking labs in 1-2 months (too early to check today).  Levels were not critically low.  Continue with the gabapentin, may consider increasing dose (however she does not want anything too sedating).   May consider starting a medication such as cymbalta, to help with symptoms and also likely underlying anxiety/somatization.     May consider further imaging at some point (brain MRI), but she would first like to see how things go with this current fast.   - Vitamin B12; Future  - Iron and iron binding capacity; Future  - CBC with platelets; Future  - gabapentin (NEURONTIN) 300 MG capsule; Take 1 capsule (300 mg) by mouth At Bedtime  - Vitamin D Deficiency; Future    Vaginitis and vulvovaginitis  Wet prep normal.   - Wet prep - lab collect; Future  - Wet prep - lab collect    Somatization:   I do not recommend long term fasting and it is important to eat more than one meal per day (otherwise your body will not absorb enough vitamins and minerals and that can contribute to abnormal symptoms).      I suggest you continue with the low sugar/low processed food diet, that is very healthy. Make sure you get plenty of vitamins/minerals from natural food (fruit/veggies/proteins) and do not rely on vitamin supplements.      If you continue to have GI symptoms after your brief fast, we could consider having you see a GI specialist.  It is very likely that uncontrolled anxiety is contributing to these symptoms (including the nerve symptoms).  You could consider an anxiety medication such as prozac/lexapro (which do not cause drowsiness).     Reach out if you would like me to prescribe something.     Bloating  She is trying a new diet, we discussed concerns I have about her not getting adequate nutrition with this bone broth diet.  I also do not recommend only 1 meal per day, as the body needs  more than 1 meal to absorb important vitamins and nutrients.         40 minutes spent on the date of the encounter doing chart review, history and exam, documentation and further activities per the note           Return in about 4 weeks (around 5/18/2022) for a recheck if symptoms do not improve.    BELEN Ramírez [facility] SURJIT Solomon is a 37 year old who presents for the following health issues     HPI     Concern - Neurological Flare-ups  Description: Patient presents to follow-up ongoing chronic nerve pains. These flare ups occur all over general body at random; mouth, feet, abdomin. Pains occur nearly every day and over time they have been becoming milder but still present.  Patient was seeing Neurologist in the past, was taking Gabapentin, worked when symptoms were mild, did not help with severe pains. Patient believes flare-ups are caused by poor diet and lack of nutrients. Patient notes a few days ago she was struggling with constipation and bloating, these have improved with diet change, pt has eliminated sugars and starches, was also taking pepto.  Therapies tried and outcome: Diet change, elimated sugars and starches. Pepto Bismol     Felt a flare-up about 3 weeks ago.  She thinks this started as she wasn't eating healthy (sugars/processed foods).  She has returned to a  diet and symptoms have improved.  Still there a little.  She is trying to fast today and drink bone broth and we'll see if this helps.      Supplements: has been found to be low in iron, she is on iron 3x/day (tolerating this dose ok).   Takes fish oil, Korean Naveen and an enzyme for candida (pre and pro biotics).   Also taking another probiotic.  Was having issues with recurrent BV and yeast.  Tried a pH vaginal suppository as well.    Was having mouth sores as well.      Recently moved from [place] and she did have lupus testing there.  She had inflammatory labs done, which were normal  "(Iron, CRP, RF and CMP).   Iron levels were very low and she has been on supplements for this.   She was told to have vitamin levels checked.    She is concerned about parasites, mainly because of the GI symptoms she has had/bloating.  No recent travel.  No sick contacts.  She states she does eat sushi.   No diarrhea now, mainly constipation (taking iron).   She has not seen a GI specialist.     She is now trying a fast, to help reset her body and is only drinking bone broth.  She plans to do this for a few days and then start eating 1 meal per day.    She has a 2 year old baby girl at home, does not want any sedating medications so she stopped the pamelor (felt groggy in the morning).     When everything first started, yes she felt anxiety (like 2 years ago).   She doesn't feel a lot of anxiety/worry at this time.     Main symptom is the nerve flare-ups and bowel issues.    No vaginal discharge or odor. She has seen a neurologist 6/30/21 and EMG/labwork were normal (consult reviewed).           Review of Systems   Constitutional, HEENT, cardiovascular, pulmonary, GI, , musculoskeletal, neuro, skin, endocrine and psych systems are negative, except as otherwise noted.      Objective    /76 (BP Location: Left arm, Patient Position: Chair, Cuff Size: Adult Regular)   Pulse 72   Temp 99.1  F (37.3  C) (Tympanic)   Ht 1.549 m (5' 1\")   Wt 57.2 kg (126 lb)   SpO2 98%   BMI 23.81 kg/m    Body mass index is 23.81 kg/m .  Physical Exam   GENERAL: healthy, alert and no distress  NECK: no adenopathy, no asymmetry, masses, or scars and thyroid normal to palpation  RESP: lungs clear to auscultation - no rales, rhonchi or wheezes  CV: regular rate and rhythm, normal S1 S2, no S3 or S4, no murmur, click or rub, no peripheral edema and peripheral pulses strong  ABDOMEN: soft, nontender, no hepatosplenomegaly, no masses and bowel sounds normal  MS: no gross musculoskeletal defects noted, no edema    Results for orders " placed or performed in visit on 04/20/22   Wet prep - lab collect     Status: Abnormal    Specimen: Vagina; Swab   Result Value Ref Range    Trichomonas Absent Absent    Yeast Absent Absent    Clue Cells Absent Absent    WBCs/high power field 1+ (A) None

## 2022-04-21 ASSESSMENT — ANXIETY QUESTIONNAIRES: GAD7 TOTAL SCORE: 1

## 2022-09-04 ENCOUNTER — HEALTH MAINTENANCE LETTER (OUTPATIENT)
Age: 37
End: 2022-09-04

## 2023-02-01 ENCOUNTER — E-VISIT (OUTPATIENT)
Dept: URGENT CARE | Facility: CLINIC | Age: 38
End: 2023-02-01
Payer: COMMERCIAL

## 2023-02-01 DIAGNOSIS — Z53.9 DIAGNOSIS NOT YET DEFINED: Primary | ICD-10-CM

## 2023-02-01 NOTE — PATIENT INSTRUCTIONS
Dear Neha Mcginnis,    We are sorry you are not feeling well. Based on the responses you provided, it is recommended that you be seen in-person in urgent care so we can better evaluate your symptoms. Please click here to find the nearest urgent care location to you.   You will not be charged for this Visit. Thank you for trusting us with your care.    Lindsay Hamlin PA-C

## 2023-04-30 ENCOUNTER — HEALTH MAINTENANCE LETTER (OUTPATIENT)
Age: 38
End: 2023-04-30

## 2023-06-05 ENCOUNTER — OFFICE VISIT (OUTPATIENT)
Dept: FAMILY MEDICINE | Facility: CLINIC | Age: 38
End: 2023-06-05
Payer: COMMERCIAL

## 2023-06-05 VITALS
TEMPERATURE: 97.9 F | OXYGEN SATURATION: 100 % | SYSTOLIC BLOOD PRESSURE: 107 MMHG | RESPIRATION RATE: 14 BRPM | BODY MASS INDEX: 24.96 KG/M2 | WEIGHT: 132.2 LBS | HEIGHT: 61 IN | DIASTOLIC BLOOD PRESSURE: 74 MMHG | HEART RATE: 68 BPM

## 2023-06-05 DIAGNOSIS — N92.0 MENORRHAGIA WITH REGULAR CYCLE: Primary | ICD-10-CM

## 2023-06-05 DIAGNOSIS — E61.1 IRON DEFICIENCY: ICD-10-CM

## 2023-06-05 LAB
ERYTHROCYTE [DISTWIDTH] IN BLOOD BY AUTOMATED COUNT: 13.4 % (ref 10–15)
FERRITIN SERPL-MCNC: 20 NG/ML (ref 6–175)
HCT VFR BLD AUTO: 35.8 % (ref 35–47)
HGB BLD-MCNC: 11.5 G/DL (ref 11.7–15.7)
HOLD SPECIMEN: NORMAL
IRON BINDING CAPACITY (ROCHE): 292 UG/DL (ref 240–430)
IRON SATN MFR SERPL: 11 % (ref 15–46)
IRON SERPL-MCNC: 32 UG/DL (ref 37–145)
MCH RBC QN AUTO: 29.7 PG (ref 26.5–33)
MCHC RBC AUTO-ENTMCNC: 32.1 G/DL (ref 31.5–36.5)
MCV RBC AUTO: 93 FL (ref 78–100)
PLATELET # BLD AUTO: 248 10E3/UL (ref 150–450)
RBC # BLD AUTO: 3.87 10E6/UL (ref 3.8–5.2)
TSH SERPL DL<=0.005 MIU/L-ACNC: 0.63 UIU/ML (ref 0.3–4.2)
WBC # BLD AUTO: 7.6 10E3/UL (ref 4–11)

## 2023-06-05 PROCEDURE — 82728 ASSAY OF FERRITIN: CPT

## 2023-06-05 PROCEDURE — 83540 ASSAY OF IRON: CPT

## 2023-06-05 PROCEDURE — 85027 COMPLETE CBC AUTOMATED: CPT

## 2023-06-05 PROCEDURE — 84443 ASSAY THYROID STIM HORMONE: CPT

## 2023-06-05 PROCEDURE — 99214 OFFICE O/P EST MOD 30 MIN: CPT

## 2023-06-05 PROCEDURE — 83550 IRON BINDING TEST: CPT

## 2023-06-05 PROCEDURE — 36415 COLL VENOUS BLD VENIPUNCTURE: CPT

## 2023-06-05 RX ORDER — NORETHINDRONE ACETATE AND ETHINYL ESTRADIOL 1.5-30(21)
1 KIT ORAL DAILY
Qty: 84 TABLET | Refills: 0 | Status: SHIPPED | OUTPATIENT
Start: 2023-06-05

## 2023-06-05 RX ORDER — NORETHINDRONE ACETATE AND ETHINYL ESTRADIOL 1.5-30(21)
1 KIT ORAL DAILY
Qty: 84 TABLET | Refills: 0 | Status: SHIPPED | OUTPATIENT
Start: 2023-06-05 | End: 2023-06-05

## 2023-06-05 ASSESSMENT — PAIN SCALES - GENERAL: PAINLEVEL: MILD PAIN (2)

## 2023-06-05 NOTE — PROGRESS NOTES
Assessment & Plan     Menorrhagia with regular cycle  Abnormal uterine bleeding. History of heavy menstrual bleeding. Check labs today and placed order for ultrasound. Trial hormonal contraceptives. Consider referral to OB/GYN.   - TSH with free T4 reflex  - CBC with Platelets and Reflex to Iron Studies  - US Pelvic Complete with Transvaginal  - norethindrone-ethinyl estradiol-iron (MICROGESTIN FE1.5/30) 1.5-30 MG-MCG tablet  Dispense: 84 tablet; Refill: 0    DEBORAH Kurtz CNP [facility]    Aziza Solomon is a 38 year old, presenting for the following health issues:  Abnormal Bleeding Problem (Patient had period 5/15-5/20 and then it returned 5/29 and she is still flowing.)        6/5/2023     1:43 PM   Additional Questions   Roomed by Monica PABON     Abnormal Bleeding Problem    History of Present Illness       Reason for visit:  Non STOP menstrual cycle  Symptoms include:  3.5 weeks of mentsrual cycle, low energy,sluggish, stomach cramps  Symptom intensity:  Moderate  Symptom progression:  Worsening  Had these symptoms before:  No  What makes it worse:  Eating unhealthy  What makes it better:  Taking iron supplements    She eats 0-1 servings of fruits and vegetables daily.She consumes 1 sweetened beverage(s) daily.She exercises with enough effort to increase her heart rate 9 or less minutes per day.  She exercises with enough effort to increase her heart rate 3 or less days per week.   She is taking medications regularly.       Had menstrual cycle for 10 days in the middle of May which is a longer duration compared to her typical periods. Bleeding stopped for approximately 1 week  then she restarted bleeding on Memorial day. Does have a history of heavy periods but usually not irregular bleeding. She uses a menstrual cup and has been emptying it approximately every hour. Has associated minor cramping, fatigue, low energy. Denies possibility of pregnancy. Denies urinary  "symptoms, fever, chills. She reports that she has been taking iron supplements which she feels are helpful     History of migraines, no aura.     Review of Systems   Genitourinary: Positive for menstrual problem.      Constitutional, HEENT, cardiovascular, pulmonary, gi and gu systems are negative, except as otherwise noted.      Objective    /74 (BP Location: Left arm, Patient Position: Sitting, Cuff Size: Adult Regular)   Pulse 68   Temp 97.9  F (36.6  C) (Tympanic)   Resp 14   Ht 1.55 m (5' 1.02\")   Wt 60 kg (132 lb 3.2 oz)   LMP 05/29/2023 (Exact Date)   SpO2 100%   Breastfeeding No   BMI 24.96 kg/m    Body mass index is 24.96 kg/m .     Physical Exam   GENERAL: healthy, alert and no distress  NECK: no adenopathy, no asymmetry, masses, or scars and thyroid normal to palpation  RESP: lungs clear to auscultation - no rales, rhonchi or wheezes  CV: regular rate and rhythm, normal S1 S2, no S3 or S4, no murmur, click or rub, no peripheral edema and peripheral pulses strong  ABDOMEN: soft, nontender, no hepatosplenomegaly, no masses and bowel sounds normal  MS: no gross musculoskeletal defects noted, no edema  PSYCH: mentation appears normal, affect normal/bright                "

## 2023-06-06 ENCOUNTER — HOSPITAL ENCOUNTER (OUTPATIENT)
Dept: ULTRASOUND IMAGING | Facility: CLINIC | Age: 38
Discharge: HOME | End: 2023-06-06
Payer: COMMERCIAL

## 2023-06-06 DIAGNOSIS — N92.0 MENORRHAGIA WITH REGULAR CYCLE: ICD-10-CM

## 2023-06-06 PROCEDURE — 76830 TRANSVAGINAL US NON-OB: CPT

## 2023-06-06 RX ORDER — FERROUS SULFATE 325(65) MG
325 TABLET ORAL
Qty: 90 TABLET | Refills: 0 | Status: SHIPPED | OUTPATIENT
Start: 2023-06-06

## 2024-02-09 NOTE — TELEPHONE ENCOUNTER
I placed an order. Thanks.   - SEEK IMMEDIATE MEDICAL CARE IF YOU HAVE ANY OF THE FOLLOWING SYMPTOMS: worsening chest pain, coughing up blood, unexplained back/neck/jaw pain, severe abdominal pain, dizziness or lightheadedness, fainting, shortness of breath, sweaty or clammy skin, vomiting, or racing heart beat. These symptoms may represent a serious problem that is an emergency. Do not wait to see if the symptoms will go away. Get medical help right away. Call 911 and do not drive yourself to the hospital.    Chest Pain    Chest pain can be caused by many different conditions which may or may not be dangerous. Causes include heartburn, lung infections, heart attack, blood clot in lungs, skin infections, strain or damage to muscle, cartilage, or bones, etc. In addition to a history and physical examination, an electrocardiogram (ECG) or other lab tests may have been performed to determine the cause of your chest pain. Follow up with your primary care provider or with a cardiologist as instructed.

## 2024-07-07 ENCOUNTER — HEALTH MAINTENANCE LETTER (OUTPATIENT)
Age: 39
End: 2024-07-07

## 2025-04-20 ENCOUNTER — HEALTH MAINTENANCE LETTER (OUTPATIENT)
Age: 40
End: 2025-04-20

## 2025-07-13 ENCOUNTER — HEALTH MAINTENANCE LETTER (OUTPATIENT)
Age: 40
End: 2025-07-13